# Patient Record
Sex: FEMALE | Race: WHITE | Employment: FULL TIME | URBAN - NONMETROPOLITAN AREA
[De-identification: names, ages, dates, MRNs, and addresses within clinical notes are randomized per-mention and may not be internally consistent; named-entity substitution may affect disease eponyms.]

---

## 2017-07-10 ENCOUNTER — HOSPITAL ENCOUNTER (EMERGENCY)
Age: 32
Discharge: HOME | End: 2017-07-10
Attending: EMERGENCY MEDICINE
Payer: MEDICARE

## 2017-07-10 ENCOUNTER — APPOINTMENT (OUTPATIENT)
Dept: GENERAL RADIOLOGY | Age: 32
End: 2017-07-10
Payer: MEDICARE

## 2017-07-10 VITALS
RESPIRATION RATE: 16 BRPM | SYSTOLIC BLOOD PRESSURE: 94 MMHG | TEMPERATURE: 97.7 F | BODY MASS INDEX: 35.65 KG/M2 | WEIGHT: 214 LBS | HEIGHT: 65 IN | DIASTOLIC BLOOD PRESSURE: 61 MMHG | HEART RATE: 64 BPM | OXYGEN SATURATION: 98 %

## 2017-07-10 DIAGNOSIS — R07.9 CHEST PAIN, UNSPECIFIED TYPE: Primary | ICD-10-CM

## 2017-07-10 LAB
ABSOLUTE EOS #: 0.3 K/UL (ref 0–0.4)
ABSOLUTE LYMPH #: 2.3 K/UL (ref 1–4.8)
ABSOLUTE MONO #: 0.7 K/UL (ref 0.1–1.2)
ANION GAP SERPL CALCULATED.3IONS-SCNC: 12 MMOL/L (ref 9–17)
BASOPHILS # BLD: 1 %
BASOPHILS ABSOLUTE: 0.1 K/UL (ref 0–0.2)
BUN BLDV-MCNC: 12 MG/DL (ref 6–20)
BUN/CREAT BLD: 20 (ref 9–20)
CALCIUM SERPL-MCNC: 8.3 MG/DL (ref 8.6–10.4)
CHLORIDE BLD-SCNC: 104 MMOL/L (ref 98–107)
CO2: 23 MMOL/L (ref 20–31)
CREAT SERPL-MCNC: 0.6 MG/DL (ref 0.5–0.9)
D DIMER: <100 NG/ML
DIFFERENTIAL TYPE: ABNORMAL
EOSINOPHILS RELATIVE PERCENT: 4 %
GFR AFRICAN AMERICAN: >60 ML/MIN
GFR NON-AFRICAN AMERICAN: >60 ML/MIN
GFR SERPL CREATININE-BSD FRML MDRD: ABNORMAL ML/MIN/{1.73_M2}
GFR SERPL CREATININE-BSD FRML MDRD: ABNORMAL ML/MIN/{1.73_M2}
GLUCOSE BLD-MCNC: 110 MG/DL (ref 70–99)
HCT VFR BLD CALC: 40 % (ref 36–46)
HEMOGLOBIN: 13.2 G/DL (ref 12–16)
INR BLD: 1
LYMPHOCYTES # BLD: 25 %
MCH RBC QN AUTO: 29.3 PG (ref 26–34)
MCHC RBC AUTO-ENTMCNC: 33 G/DL (ref 31–37)
MCV RBC AUTO: 88.7 FL (ref 80–100)
MONOCYTES # BLD: 7 %
PARTIAL THROMBOPLASTIN TIME: 29.7 SEC (ref 27–35)
PDW BLD-RTO: 15.1 % (ref 11–14.5)
PLATELET # BLD: 250 K/UL (ref 140–450)
PLATELET ESTIMATE: ABNORMAL
PMV BLD AUTO: 8 FL (ref 6–12)
POTASSIUM SERPL-SCNC: 3.7 MMOL/L (ref 3.7–5.3)
PROTHROMBIN TIME: 10.5 SEC (ref 9.4–11.3)
RBC # BLD: 4.51 M/UL (ref 4–5.2)
RBC # BLD: ABNORMAL 10*6/UL
SEG NEUTROPHILS: 63 %
SEGMENTED NEUTROPHILS ABSOLUTE COUNT: 5.9 K/UL (ref 1.8–7.7)
SODIUM BLD-SCNC: 139 MMOL/L (ref 135–144)
TROPONIN INTERP: NORMAL
TROPONIN T: <0.03 NG/ML
WBC # BLD: 9.3 K/UL (ref 3.5–11)
WBC # BLD: ABNORMAL 10*3/UL

## 2017-07-10 PROCEDURE — 36415 COLL VENOUS BLD VENIPUNCTURE: CPT

## 2017-07-10 PROCEDURE — 96372 THER/PROPH/DIAG INJ SC/IM: CPT

## 2017-07-10 PROCEDURE — 71010 XR CHEST PORTABLE: CPT

## 2017-07-10 PROCEDURE — 85730 THROMBOPLASTIN TIME PARTIAL: CPT

## 2017-07-10 PROCEDURE — 85610 PROTHROMBIN TIME: CPT

## 2017-07-10 PROCEDURE — 93005 ELECTROCARDIOGRAM TRACING: CPT

## 2017-07-10 PROCEDURE — 84484 ASSAY OF TROPONIN QUANT: CPT

## 2017-07-10 PROCEDURE — 71010 XR CHEST PORTABLE: CPT | Performed by: RADIOLOGY

## 2017-07-10 PROCEDURE — 80048 BASIC METABOLIC PNL TOTAL CA: CPT

## 2017-07-10 PROCEDURE — 6360000002 HC RX W HCPCS: Performed by: EMERGENCY MEDICINE

## 2017-07-10 PROCEDURE — 85025 COMPLETE CBC W/AUTO DIFF WBC: CPT

## 2017-07-10 PROCEDURE — 85379 FIBRIN DEGRADATION QUANT: CPT

## 2017-07-10 PROCEDURE — 99285 EMERGENCY DEPT VISIT HI MDM: CPT

## 2017-07-10 PROCEDURE — 6370000000 HC RX 637 (ALT 250 FOR IP): Performed by: EMERGENCY MEDICINE

## 2017-07-10 RX ORDER — VENLAFAXINE 100 MG/1
300 TABLET ORAL DAILY
COMMUNITY
End: 2018-07-23 | Stop reason: ALTCHOICE

## 2017-07-10 RX ORDER — TIZANIDINE 4 MG/1
4 TABLET ORAL EVERY 6 HOURS PRN
COMMUNITY
End: 2018-02-14 | Stop reason: ALTCHOICE

## 2017-07-10 RX ORDER — HYDROXYZINE 50 MG/1
50 TABLET, FILM COATED ORAL 2 TIMES DAILY PRN
COMMUNITY
End: 2019-02-18

## 2017-07-10 RX ORDER — GABAPENTIN 600 MG/1
600 TABLET ORAL 3 TIMES DAILY
COMMUNITY
End: 2018-05-29

## 2017-07-10 RX ORDER — KETOROLAC TROMETHAMINE 30 MG/ML
60 INJECTION, SOLUTION INTRAMUSCULAR; INTRAVENOUS ONCE
Status: COMPLETED | OUTPATIENT
Start: 2017-07-10 | End: 2017-07-10

## 2017-07-10 RX ORDER — ASPIRIN 81 MG/1
324 TABLET, CHEWABLE ORAL ONCE
Status: COMPLETED | OUTPATIENT
Start: 2017-07-10 | End: 2017-07-10

## 2017-07-10 RX ORDER — TRAZODONE HYDROCHLORIDE 150 MG/1
150 TABLET ORAL NIGHTLY
COMMUNITY
End: 2018-07-23 | Stop reason: ALTCHOICE

## 2017-07-10 RX ORDER — PRAZOSIN HYDROCHLORIDE 1 MG/1
1 CAPSULE ORAL NIGHTLY
COMMUNITY
End: 2018-07-23 | Stop reason: ALTCHOICE

## 2017-07-10 RX ORDER — ALBUTEROL SULFATE 90 UG/1
2 AEROSOL, METERED RESPIRATORY (INHALATION) EVERY 6 HOURS PRN
COMMUNITY
End: 2021-04-29

## 2017-07-10 RX ADMIN — KETOROLAC TROMETHAMINE 60 MG: 30 INJECTION, SOLUTION INTRAMUSCULAR at 15:59

## 2017-07-10 RX ADMIN — ASPIRIN 81 MG 324 MG: 81 TABLET ORAL at 15:57

## 2017-07-10 ASSESSMENT — PAIN DESCRIPTION - ORIENTATION: ORIENTATION: LEFT

## 2017-07-10 ASSESSMENT — PAIN DESCRIPTION - DESCRIPTORS: DESCRIPTORS: PRESSURE;SHARP

## 2017-07-10 ASSESSMENT — PAIN DESCRIPTION - PAIN TYPE: TYPE: ACUTE PAIN

## 2017-07-10 ASSESSMENT — PAIN DESCRIPTION - DIRECTION: RADIATING_TOWARDS: L ARM

## 2017-07-10 ASSESSMENT — PAIN SCALES - GENERAL
PAINLEVEL_OUTOF10: 3
PAINLEVEL_OUTOF10: 5
PAINLEVEL_OUTOF10: 5

## 2017-07-10 ASSESSMENT — PAIN DESCRIPTION - LOCATION: LOCATION: CHEST

## 2017-07-10 ASSESSMENT — PAIN DESCRIPTION - ONSET: ONSET: GRADUAL

## 2017-07-15 LAB
EKG ATRIAL RATE: 75 BPM
EKG P AXIS: 29 DEGREES
EKG P-R INTERVAL: 144 MS
EKG Q-T INTERVAL: 394 MS
EKG QRS DURATION: 82 MS
EKG QTC CALCULATION (BAZETT): 439 MS
EKG R AXIS: 26 DEGREES
EKG T AXIS: 24 DEGREES
EKG VENTRICULAR RATE: 75 BPM

## 2017-08-24 ENCOUNTER — HOSPITAL ENCOUNTER (EMERGENCY)
Age: 32
Discharge: HOME | End: 2017-08-24
Attending: EMERGENCY MEDICINE
Payer: MEDICARE

## 2017-08-24 VITALS
DIASTOLIC BLOOD PRESSURE: 64 MMHG | TEMPERATURE: 98.1 F | SYSTOLIC BLOOD PRESSURE: 112 MMHG | RESPIRATION RATE: 12 BRPM | WEIGHT: 220 LBS | OXYGEN SATURATION: 98 % | HEART RATE: 90 BPM | BODY MASS INDEX: 36.61 KG/M2

## 2017-08-24 DIAGNOSIS — R51.9 ACUTE NONINTRACTABLE HEADACHE, UNSPECIFIED HEADACHE TYPE: Primary | ICD-10-CM

## 2017-08-24 PROCEDURE — 96375 TX/PRO/DX INJ NEW DRUG ADDON: CPT

## 2017-08-24 PROCEDURE — 6360000002 HC RX W HCPCS: Performed by: EMERGENCY MEDICINE

## 2017-08-24 PROCEDURE — 99283 EMERGENCY DEPT VISIT LOW MDM: CPT

## 2017-08-24 PROCEDURE — 96374 THER/PROPH/DIAG INJ IV PUSH: CPT

## 2017-08-24 PROCEDURE — 2580000003 HC RX 258: Performed by: EMERGENCY MEDICINE

## 2017-08-24 RX ORDER — 0.9 % SODIUM CHLORIDE 0.9 %
1000 INTRAVENOUS SOLUTION INTRAVENOUS ONCE
Status: COMPLETED | OUTPATIENT
Start: 2017-08-24 | End: 2017-08-24

## 2017-08-24 RX ORDER — PROMETHAZINE HYDROCHLORIDE 25 MG/ML
12.5 INJECTION, SOLUTION INTRAMUSCULAR; INTRAVENOUS ONCE
Status: COMPLETED | OUTPATIENT
Start: 2017-08-24 | End: 2017-08-24

## 2017-08-24 RX ORDER — KETOROLAC TROMETHAMINE 30 MG/ML
30 INJECTION, SOLUTION INTRAMUSCULAR; INTRAVENOUS ONCE
Status: COMPLETED | OUTPATIENT
Start: 2017-08-24 | End: 2017-08-24

## 2017-08-24 RX ORDER — DIPHENHYDRAMINE HYDROCHLORIDE 50 MG/ML
25 INJECTION INTRAMUSCULAR; INTRAVENOUS EVERY 6 HOURS PRN
Status: DISCONTINUED | OUTPATIENT
Start: 2017-08-24 | End: 2017-08-24 | Stop reason: HOSPADM

## 2017-08-24 RX ADMIN — PROMETHAZINE HYDROCHLORIDE 12.5 MG: 25 INJECTION INTRAMUSCULAR; INTRAVENOUS at 12:44

## 2017-08-24 RX ADMIN — SODIUM CHLORIDE 1000 ML: 9 INJECTION, SOLUTION INTRAVENOUS at 12:44

## 2017-08-24 RX ADMIN — DIPHENHYDRAMINE HYDROCHLORIDE 25 MG: 50 INJECTION, SOLUTION INTRAMUSCULAR; INTRAVENOUS at 12:44

## 2017-08-24 RX ADMIN — KETOROLAC TROMETHAMINE 30 MG: 30 INJECTION, SOLUTION INTRAMUSCULAR at 12:44

## 2017-08-24 ASSESSMENT — PAIN SCALES - GENERAL
PAINLEVEL_OUTOF10: 8
PAINLEVEL_OUTOF10: 5
PAINLEVEL_OUTOF10: 8

## 2017-08-24 ASSESSMENT — PAIN DESCRIPTION - LOCATION: LOCATION: HEAD

## 2017-08-24 ASSESSMENT — PAIN DESCRIPTION - PROGRESSION: CLINICAL_PROGRESSION: NOT CHANGED

## 2017-08-24 ASSESSMENT — PAIN DESCRIPTION - ONSET: ONSET: SUDDEN

## 2017-08-24 ASSESSMENT — PAIN DESCRIPTION - PAIN TYPE: TYPE: ACUTE PAIN

## 2017-08-24 ASSESSMENT — PAIN DESCRIPTION - ORIENTATION: ORIENTATION: RIGHT;POSTERIOR

## 2017-08-24 ASSESSMENT — PAIN DESCRIPTION - FREQUENCY: FREQUENCY: CONTINUOUS

## 2017-08-24 ASSESSMENT — PAIN DESCRIPTION - DESCRIPTORS: DESCRIPTORS: ACHING

## 2017-12-10 ENCOUNTER — HOSPITAL ENCOUNTER (EMERGENCY)
Age: 32
Discharge: HOME | End: 2017-12-10
Attending: EMERGENCY MEDICINE
Payer: MEDICARE

## 2017-12-10 ENCOUNTER — APPOINTMENT (OUTPATIENT)
Dept: GENERAL RADIOLOGY | Age: 32
End: 2017-12-10
Payer: MEDICARE

## 2017-12-10 VITALS
WEIGHT: 220 LBS | BODY MASS INDEX: 36.65 KG/M2 | TEMPERATURE: 98.4 F | OXYGEN SATURATION: 97 % | DIASTOLIC BLOOD PRESSURE: 84 MMHG | HEART RATE: 84 BPM | RESPIRATION RATE: 16 BRPM | HEIGHT: 65 IN | SYSTOLIC BLOOD PRESSURE: 131 MMHG

## 2017-12-10 DIAGNOSIS — J40 BRONCHITIS: Primary | ICD-10-CM

## 2017-12-10 LAB
DIRECT EXAM: NORMAL
Lab: NORMAL
SPECIMEN DESCRIPTION: NORMAL
STATUS: NORMAL

## 2017-12-10 PROCEDURE — 71020 XR CHEST STANDARD TWO VW: CPT

## 2017-12-10 PROCEDURE — 87804 INFLUENZA ASSAY W/OPTIC: CPT

## 2017-12-10 PROCEDURE — 99283 EMERGENCY DEPT VISIT LOW MDM: CPT

## 2017-12-10 RX ORDER — ONDANSETRON 4 MG/1
4 TABLET, ORALLY DISINTEGRATING ORAL ONCE
Status: DISCONTINUED | OUTPATIENT
Start: 2017-12-10 | End: 2017-12-10

## 2017-12-10 RX ORDER — BENZONATATE 200 MG/1
200 CAPSULE ORAL 3 TIMES DAILY PRN
Qty: 30 CAPSULE | Refills: 0 | Status: SHIPPED | OUTPATIENT
Start: 2017-12-10 | End: 2017-12-17

## 2017-12-10 RX ORDER — KETOROLAC TROMETHAMINE 30 MG/ML
30 INJECTION, SOLUTION INTRAMUSCULAR; INTRAVENOUS ONCE
Status: DISCONTINUED | OUTPATIENT
Start: 2017-12-10 | End: 2017-12-10

## 2017-12-10 RX ORDER — AZITHROMYCIN 250 MG/1
TABLET, FILM COATED ORAL
Qty: 1 PACKET | Refills: 0 | Status: SHIPPED | OUTPATIENT
Start: 2017-12-10 | End: 2017-12-20

## 2017-12-10 ASSESSMENT — ENCOUNTER SYMPTOMS
SORE THROAT: 1
ABDOMINAL PAIN: 0
WHEEZING: 0
SHORTNESS OF BREATH: 0
BACK PAIN: 0
CONSTIPATION: 0
RHINORRHEA: 1
NAUSEA: 0
BLOOD IN STOOL: 0
VOICE CHANGE: 1
COUGH: 1
DIARRHEA: 0
EYE PAIN: 0
VOMITING: 0

## 2017-12-10 ASSESSMENT — PAIN SCALES - GENERAL
PAINLEVEL_OUTOF10: 3
PAINLEVEL_OUTOF10: 2

## 2017-12-10 ASSESSMENT — PAIN DESCRIPTION - PAIN TYPE: TYPE: ACUTE PAIN

## 2017-12-10 ASSESSMENT — PAIN DESCRIPTION - LOCATION: LOCATION: THROAT

## 2017-12-10 NOTE — ED PROVIDER NOTES
Mandible fracture surgery; Tubal ligation; back surgery; and  section. CURRENT MEDICATIONS       Previous Medications    ALBUTEROL SULFATE  (90 BASE) MCG/ACT INHALER    Inhale 2 puffs into the lungs every 6 hours as needed for Wheezing    BUDESONIDE-FORMOTEROL FUMARATE (SYMBICORT IN)    Inhale into the lungs    GABAPENTIN (NEURONTIN) 600 MG TABLET    Take 600 mg by mouth 3 times daily    HYDROXYZINE (ATARAX) 50 MG TABLET    Take 50 mg by mouth 2 times daily as needed for Itching    PRAZOSIN (MINIPRESS) 1 MG CAPSULE    Take 1 mg by mouth nightly    TIZANIDINE (ZANAFLEX) 4 MG TABLET    Take 4 mg by mouth every 6 hours as needed    TOPIRAMATE (TOPAMAX PO)    Take by mouth    TRAZODONE (DESYREL) 150 MG TABLET    Take 150 mg by mouth nightly    VENLAFAXINE (EFFEXOR) 100 MG TABLET    Take 300 mg by mouth daily       ALLERGIES     is allergic to amoxicillin; clindamycin/lincomycin; and pcn [penicillins]. FAMILY HISTORY     has no family status information on file. family history is not on file. SOCIAL HISTORY      reports that she has been smoking Cigarettes. She has been smoking about 1.00 pack per day. She has never used smokeless tobacco.    PHYSICAL EXAM     INITIAL VITALS:  height is 5' 5\" (1.651 m) and weight is 220 lb (99.8 kg). Her tympanic temperature is 98.4 °F (36.9 °C). Her blood pressure is 131/84 and her pulse is 84. Her respiration is 16 and oxygen saturation is 97%. Physical Exam   Constitutional: She is oriented to person, place, and time. She appears well-developed and well-nourished. No distress. HENT:   Head: Normocephalic and atraumatic. Right Ear: External ear normal.   Left Ear: External ear normal.   Mouth/Throat: Oropharynx is clear and moist.   Eyes: Conjunctivae and EOM are normal. Pupils are equal, round, and reactive to light. Neck: Normal range of motion. Cardiovascular: Normal rate and regular rhythm.     Pulmonary/Chest: Effort normal and breath sounds normal. She has no wheezes. She has no rales. Patient has a harsh cough but lungs are generally clear   Abdominal: Soft. Bowel sounds are normal.   Musculoskeletal: She exhibits no edema or tenderness. Neurological: She is alert and oriented to person, place, and time. Skin: Skin is warm and dry. She is not diaphoretic. Psychiatric: She has a normal mood and affect. Her behavior is normal.       DIFFERENTIAL DIAGNOSIS/ MDM:     Viral syndrome, versus influenza versus pneumonia    DIAGNOSTIC RESULTS     EKG: All EKG's are interpreted by the Emergency Department Physician who either signs or Co-signs this chart in the absence of a cardiologist.        Michelle Javier:   I directly visualized the following  images and reviewed the radiologist interpretations:     EXAMINATION:   TWO VIEWS OF THE CHEST       12/10/2017 10:51 am       COMPARISON:   Chest July 10, 2017.       HISTORY:   ORDERING SYSTEM PROVIDED HISTORY: cough   TECHNOLOGIST PROVIDED HISTORY:   Reason for exam:->cough   Ordering Physician Provided Reason for Exam: Cough for 1 week.  History of   COPD and smoking for 20 years   Acuity: Acute   Type of Exam: Initial       FINDINGS:   Heart is normal in size.  Lungs are clear.  No free air.  Osseous structures   demonstrate mild degenerative change.           Impression   No acute process.                 ED BEDSIDE ULTRASOUND:       LABS:  Labs Reviewed   RAPID INFLUENZA A/B ANTIGENS           EMERGENCY DEPARTMENT COURSE:   Vitals:    Vitals:    12/10/17 1024   BP: 131/84   Pulse: 84   Resp: 16   Temp: 98.4 °F (36.9 °C)   TempSrc: Tympanic   SpO2: 97%   Weight: 220 lb (99.8 kg)   Height: 5' 5\" (1.651 m)     -------------------------  BP: 131/84, Temp: 98.4 °F (36.9 °C), Pulse: 84, Resp: 16        Re-evaluation Notes        CRITICAL CARE:   None        CONSULTS:      PROCEDURES:  None    FINAL IMPRESSION      1.  Bronchitis          DISPOSITION/PLAN   DISPOSITION Discharge    Condition on Disposition  Stable      PATIENT REFERRED TO:  Isacanusha Vazquez [address]  [phone]    Schedule an appointment as soon as possible for a visit in 3 days        DISCHARGE MEDICATIONS:  New Prescriptions    AZITHROMYCIN (ZITHROMAX) 250 MG TABLET    Take 2 tablets (500 mg) on Day 1, followed by 1 tablet (250 mg) once daily on Days 2 through 5. BENZONATATE (TESSALON) 200 MG CAPSULE    Take 1 capsule by mouth 3 times daily as needed for Cough       (Please note that portions of this note were completed with a voice recognition program.  Efforts were made to edit the dictations but occasionally words are mis-transcribed.)    Hinkle MD, F.A.A.E.M.   Attending Emergency Physician                            Amna Sullivan MD  12/10/17 [id number]

## 2018-02-14 ENCOUNTER — OFFICE VISIT (OUTPATIENT)
Dept: FAMILY MEDICINE CLINIC | Age: 33
End: 2018-02-14
Payer: MEDICARE

## 2018-02-14 ENCOUNTER — HOSPITAL ENCOUNTER (OUTPATIENT)
Dept: LAB | Age: 33
Setting detail: SPECIMEN
Discharge: HOME | End: 2018-02-14
Payer: MEDICARE

## 2018-02-14 VITALS
OXYGEN SATURATION: 98 % | DIASTOLIC BLOOD PRESSURE: 70 MMHG | RESPIRATION RATE: 12 BRPM | HEART RATE: 91 BPM | SYSTOLIC BLOOD PRESSURE: 118 MMHG | TEMPERATURE: 96.1 F | HEIGHT: 65 IN | BODY MASS INDEX: 35.45 KG/M2 | WEIGHT: 212.8 LBS

## 2018-02-14 DIAGNOSIS — N76.0 BV (BACTERIAL VAGINOSIS): ICD-10-CM

## 2018-02-14 DIAGNOSIS — Z11.4 SCREENING FOR HIV (HUMAN IMMUNODEFICIENCY VIRUS): ICD-10-CM

## 2018-02-14 DIAGNOSIS — F32.A ANXIETY AND DEPRESSION: ICD-10-CM

## 2018-02-14 DIAGNOSIS — F31.9 BIPOLAR 1 DISORDER (HCC): ICD-10-CM

## 2018-02-14 DIAGNOSIS — K64.9 HEMORRHOIDS, UNSPECIFIED HEMORRHOID TYPE: ICD-10-CM

## 2018-02-14 DIAGNOSIS — F41.9 ANXIETY AND DEPRESSION: ICD-10-CM

## 2018-02-14 DIAGNOSIS — Z00.00 ROUTINE HEALTH MAINTENANCE: ICD-10-CM

## 2018-02-14 DIAGNOSIS — F43.10 PTSD (POST-TRAUMATIC STRESS DISORDER): ICD-10-CM

## 2018-02-14 DIAGNOSIS — N89.8 CYST, VAGINA: ICD-10-CM

## 2018-02-14 DIAGNOSIS — Z00.00 ROUTINE HEALTH MAINTENANCE: Primary | ICD-10-CM

## 2018-02-14 DIAGNOSIS — B96.89 BV (BACTERIAL VAGINOSIS): ICD-10-CM

## 2018-02-14 DIAGNOSIS — F19.11 SUBSTANCE ABUSE IN REMISSION (HCC): ICD-10-CM

## 2018-02-14 DIAGNOSIS — Z12.4 CERVICAL CANCER SCREENING: ICD-10-CM

## 2018-02-14 LAB
ABSOLUTE EOS #: 0.3 K/UL (ref 0–0.4)
ABSOLUTE IMMATURE GRANULOCYTE: NORMAL K/UL (ref 0–0.3)
ABSOLUTE LYMPH #: 2 K/UL (ref 1–4.8)
ABSOLUTE MONO #: 0.5 K/UL (ref 0.1–1.2)
ALBUMIN SERPL-MCNC: 4.2 G/DL (ref 3.5–5.2)
ALBUMIN/GLOBULIN RATIO: 1.4 (ref 1–2.5)
ALP BLD-CCNC: 114 U/L (ref 35–104)
ALT SERPL-CCNC: 14 U/L (ref 5–33)
ANION GAP SERPL CALCULATED.3IONS-SCNC: 15 MMOL/L (ref 9–17)
AST SERPL-CCNC: 14 U/L
BASOPHILS # BLD: 1 % (ref 0–1)
BASOPHILS ABSOLUTE: 0.1 K/UL (ref 0–0.2)
BILIRUB SERPL-MCNC: 0.36 MG/DL (ref 0.3–1.2)
BUN BLDV-MCNC: 14 MG/DL (ref 6–20)
BUN/CREAT BLD: 20 (ref 9–20)
CALCIUM SERPL-MCNC: 8.8 MG/DL (ref 8.6–10.4)
CHLORIDE BLD-SCNC: 107 MMOL/L (ref 98–107)
CHOLESTEROL/HDL RATIO: 4.7
CHOLESTEROL: 187 MG/DL
CO2: 20 MMOL/L (ref 20–31)
CREAT SERPL-MCNC: 0.69 MG/DL (ref 0.5–0.9)
DIFFERENTIAL TYPE: NORMAL
EOSINOPHILS RELATIVE PERCENT: 3 % (ref 1–7)
ESTIMATED AVERAGE GLUCOSE: 111 MG/DL
GFR AFRICAN AMERICAN: >60 ML/MIN
GFR NON-AFRICAN AMERICAN: >60 ML/MIN
GFR SERPL CREATININE-BSD FRML MDRD: ABNORMAL ML/MIN/{1.73_M2}
GFR SERPL CREATININE-BSD FRML MDRD: ABNORMAL ML/MIN/{1.73_M2}
GLUCOSE BLD-MCNC: 95 MG/DL (ref 70–99)
HBA1C MFR BLD: 5.5 % (ref 4.8–5.9)
HCT VFR BLD CALC: 42.3 % (ref 36–46)
HDLC SERPL-MCNC: 40 MG/DL
HEMOGLOBIN: 14.2 G/DL (ref 12–16)
HIV AG/AB: NONREACTIVE
IMMATURE GRANULOCYTES: NORMAL %
LDL CHOLESTEROL: 130 MG/DL (ref 0–130)
LYMPHOCYTES # BLD: 23 % (ref 16–46)
MCH RBC QN AUTO: 30.6 PG (ref 26–34)
MCHC RBC AUTO-ENTMCNC: 33.5 G/DL (ref 31–37)
MCV RBC AUTO: 91.2 FL (ref 80–100)
MONOCYTES # BLD: 6 % (ref 4–11)
NRBC AUTOMATED: NORMAL PER 100 WBC
PDW BLD-RTO: 14.3 % (ref 11–14.5)
PLATELET # BLD: 287 K/UL (ref 140–450)
PLATELET ESTIMATE: NORMAL
PMV BLD AUTO: 8.2 FL (ref 6–12)
POTASSIUM SERPL-SCNC: 4 MMOL/L (ref 3.7–5.3)
RBC # BLD: 4.64 M/UL (ref 4–5.2)
RBC # BLD: NORMAL 10*6/UL
SEG NEUTROPHILS: 67 % (ref 43–77)
SEGMENTED NEUTROPHILS ABSOLUTE COUNT: 5.8 K/UL (ref 1.8–7.7)
SODIUM BLD-SCNC: 142 MMOL/L (ref 135–144)
THYROXINE, FREE: 0.99 NG/DL (ref 0.93–1.7)
TOTAL PROTEIN: 7.1 G/DL (ref 6.4–8.3)
TRIGL SERPL-MCNC: 85 MG/DL
TSH SERPL DL<=0.05 MIU/L-ACNC: 2.34 MIU/L (ref 0.3–5)
VITAMIN D 25-HYDROXY: 23.8 NG/ML (ref 30–100)
VLDLC SERPL CALC-MCNC: ABNORMAL MG/DL (ref 1–30)
WBC # BLD: 8.7 K/UL (ref 3.5–11)
WBC # BLD: NORMAL 10*3/UL

## 2018-02-14 PROCEDURE — 80061 LIPID PANEL: CPT

## 2018-02-14 PROCEDURE — 87389 HIV-1 AG W/HIV-1&-2 AB AG IA: CPT

## 2018-02-14 PROCEDURE — 99385 PREV VISIT NEW AGE 18-39: CPT | Performed by: NURSE PRACTITIONER

## 2018-02-14 PROCEDURE — 84439 ASSAY OF FREE THYROXINE: CPT

## 2018-02-14 PROCEDURE — 36415 COLL VENOUS BLD VENIPUNCTURE: CPT

## 2018-02-14 PROCEDURE — 85025 COMPLETE CBC W/AUTO DIFF WBC: CPT

## 2018-02-14 PROCEDURE — 82306 VITAMIN D 25 HYDROXY: CPT

## 2018-02-14 PROCEDURE — 80053 COMPREHEN METABOLIC PANEL: CPT

## 2018-02-14 PROCEDURE — 84443 ASSAY THYROID STIM HORMONE: CPT

## 2018-02-14 PROCEDURE — 83036 HEMOGLOBIN GLYCOSYLATED A1C: CPT

## 2018-02-14 RX ORDER — NABUMETONE 500 MG/1
500 TABLET, FILM COATED ORAL DAILY
COMMUNITY
End: 2018-07-23 | Stop reason: ALTCHOICE

## 2018-02-14 RX ORDER — BUDESONIDE AND FORMOTEROL FUMARATE DIHYDRATE 80; 4.5 UG/1; UG/1
2 AEROSOL RESPIRATORY (INHALATION) 2 TIMES DAILY
COMMUNITY
End: 2021-04-29

## 2018-02-14 RX ORDER — IBUPROFEN 800 MG/1
800 TABLET ORAL
COMMUNITY
Start: 2017-11-27 | End: 2018-07-23

## 2018-02-14 RX ORDER — NICOTINE 21 MG/24HR
14 PATCH, TRANSDERMAL 24 HOURS TRANSDERMAL
COMMUNITY
Start: 2017-12-13 | End: 2020-01-14

## 2018-02-14 RX ORDER — METRONIDAZOLE 500 MG/1
500 TABLET ORAL 2 TIMES DAILY
Qty: 14 TABLET | Refills: 0 | Status: SHIPPED | OUTPATIENT
Start: 2018-02-14 | End: 2018-05-29 | Stop reason: ALTCHOICE

## 2018-02-14 ASSESSMENT — ENCOUNTER SYMPTOMS
TROUBLE SWALLOWING: 0
VOMITING: 0
ABDOMINAL PAIN: 0
CHEST TIGHTNESS: 0
CONSTIPATION: 0
ALLERGIC/IMMUNOLOGIC NEGATIVE: 1
DIARRHEA: 0
NAUSEA: 0
COUGH: 0
SHORTNESS OF BREATH: 0
EYES NEGATIVE: 1
SINUS PRESSURE: 0

## 2018-02-14 ASSESSMENT — PATIENT HEALTH QUESTIONNAIRE - PHQ9
SUM OF ALL RESPONSES TO PHQ QUESTIONS 1-9: 0
2. FEELING DOWN, DEPRESSED OR HOPELESS: 0
1. LITTLE INTEREST OR PLEASURE IN DOING THINGS: 0
SUM OF ALL RESPONSES TO PHQ9 QUESTIONS 1 & 2: 0

## 2018-02-14 NOTE — PROGRESS NOTES
[facility]    Subjective:      Patient ID: Jessica Tran is a 27 y.o. y.o. female. HPI Patient in for office to establish with new PCP. She was previously and recently seen bey her PCP this month. Today she has a concern related to vaginal pain, itching, discharge, and lump in the area. She states that it started couple months. She states that it is not any worse or any better. She states that she has tried OTC medication for yeast infection. She states that she has had the lump for a couple years but does not recall that it has been that size. When she wipes she states when she wipe or uses a tampon she notices it but it is painful. LMP 2018 and she states those are pretty regular. I have reviewed the patient's medical history in detail and updated the computerized patient record. She does have a history of BV. She states that her symptoms seem to be closely related to this. She has had 2 partners in the last 10 months. She states that she does have hemorrhoids as well. She states that they swell and cause her pain when she has a BM. She has used OTC products without much effect. She sees behavioral health recovery services for her anxiety, bipolar, depression and PTSD and they order all of her current medications. 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 She is agreeable to lab work.      Past Medical History:   Diagnosis Date    Anxiety     Bipolar 1 disorder ([facility] [address]     COPD (chronic obstructive pulmonary disease) (HCC)     Headache     Major depressive disorder     PTSD (post-traumatic stress disorder)     Substance abuse     recovering alcoholic and drug [facility]-02 mo sober       Past Surgical History:   Procedure Laterality Date    APPENDECTOMY      BACK SURGERY       SECTION      MANDIBLE FRACTURE SURGERY      TONSILLECTOMY      TUBAL LIGATION      WISDOM TOOTH EXTRACTION         Family History   Problem Relation Age of Onset Negative for cough, chest tightness and shortness of breath. Cardiovascular: Negative for chest pain and palpitations. Gastrointestinal: Negative for abdominal pain, constipation, diarrhea, nausea and vomiting. Endocrine: Negative. Genitourinary: Positive for genital sores (lump on vaginal area. chronic), vaginal discharge (foul odor by report) and vaginal pain. Negative for difficulty urinating, dysuria, hematuria and menstrual problem. Musculoskeletal: Negative. Skin: Negative. Lump in vaginal area-chronic she states that it is firm  External hemorrhoids by report     Allergic/Immunologic: Negative. Neurological: Negative for dizziness, light-headedness and headaches. Hematological: Negative. Psychiatric/Behavioral: Negative. Objective:      /70 (Site: Right Arm, Position: Sitting, Cuff Size: Medium Adult)   Pulse 91   Temp 96.1 °F (35.6 °C) (Tympanic)   Resp 12   Ht 5' 5\" (1.651 m)   Wt 212 lb 12.8 oz (96.5 kg)   LMP 01/31/2018 (Exact Date)   SpO2 98%   Breastfeeding? No   BMI 35.41 kg/m²     Physical Exam   Constitutional: She is oriented to person, place, and time. She appears well-developed and well-nourished. HENT:   Head: Normocephalic and atraumatic. Right Ear: Hearing, tympanic membrane and external ear normal.   Left Ear: Hearing, tympanic membrane and external ear normal.   Nose: Nose normal.   Mouth/Throat: Uvula is midline, oropharynx is clear and moist and mucous membranes are normal. No oropharyngeal exudate or posterior oropharyngeal erythema. Eyes: Conjunctivae and EOM are normal. Pupils are equal, round, and reactive to light. Neck: Normal range of motion. Neck supple. Cardiovascular: Normal rate, regular rhythm, normal heart sounds and intact distal pulses. Pulmonary/Chest: Effort normal and breath sounds normal.   Abdominal: Soft. Bowel sounds are normal.   Genitourinary:       There is erythema in the vagina.  Vaginal discharge found.   Genitourinary Comments: RED-hard not draining flesh colored-by report patient did not want exam today because she is seeing OB/GYN   Musculoskeletal: Normal range of motion. Neurological: She is alert and oriented to person, place, and time. Skin: Skin is warm and dry. Psychiatric: She has a normal mood and affect. Her behavior is normal. Judgment and thought content normal.     Assessment & Plan:      1. Routine health maintenance  Will call with results and treat if needed  - Hemoglobin A1C; Future  - Comprehensive Metabolic Panel; Future  - CBC Auto Differential; Future  - Lipid Panel; Future  - Vitamin D 25 Hydroxy; Future  - TSH without Reflex; Future  - T4, Free; Future  - HIV Screen; Future    2. Screening for HIV (human immunodeficiency virus)    - HIV Screen; Future    3. Cervical cancer screening    - [address] [place] [address], Gynecology [place]    4. Cyst, vagina-chronic   Differential includes bartholins cyst  - [address] [place] [address], Gynecology [place]    5. BV (bacterial vaginosis)-acute    - metroNIDAZOLE (FLAGYL) 500 MG tablet; Take 1 tablet by mouth 2 times daily  Dispense: 14 tablet; Refill: 0  - clindamycin (CLEOCIN) 100 MG vaginal suppository; Place 1 suppository vaginally nightly for 7 days  Dispense: 7 suppository; Refill: 0    6. Hemorrhoids, unspecified hemorrhoid type-chronic  Discussed her options about this She would like to think about this and let us know what she decides. We discussed referral to gen surgery for eval and treatment. Follow up with behavioral health for her routine medications. Answered all of the patient's questions. Agrees with plan of care. Follow up in one year or sooner if needed.      Annalisa Chang NP  2/14/2018 10:48 AM

## 2018-05-29 ENCOUNTER — OFFICE VISIT (OUTPATIENT)
Dept: PRIMARY CARE CLINIC | Age: 33
End: 2018-05-29
Payer: COMMERCIAL

## 2018-05-29 VITALS
BODY MASS INDEX: 34.55 KG/M2 | TEMPERATURE: 97.6 F | HEART RATE: 78 BPM | SYSTOLIC BLOOD PRESSURE: 110 MMHG | HEIGHT: 65 IN | WEIGHT: 207.4 LBS | OXYGEN SATURATION: 97 % | DIASTOLIC BLOOD PRESSURE: 70 MMHG | RESPIRATION RATE: 12 BRPM

## 2018-05-29 DIAGNOSIS — S61.412A LACERATION OF LEFT HAND WITHOUT FOREIGN BODY, INITIAL ENCOUNTER: Primary | ICD-10-CM

## 2018-05-29 PROCEDURE — 99202 OFFICE O/P NEW SF 15 MIN: CPT | Performed by: NURSE PRACTITIONER

## 2018-05-29 PROCEDURE — 90471 IMMUNIZATION ADMIN: CPT | Performed by: NURSE PRACTITIONER

## 2018-05-29 PROCEDURE — 90715 TDAP VACCINE 7 YRS/> IM: CPT | Performed by: NURSE PRACTITIONER

## 2018-05-29 PROCEDURE — 12031 INTMD RPR S/A/T/EXT 2.5 CM/<: CPT | Performed by: NURSE PRACTITIONER

## 2018-05-29 ASSESSMENT — ENCOUNTER SYMPTOMS: RESPIRATORY NEGATIVE: 1

## 2018-06-29 ENCOUNTER — HOSPITAL ENCOUNTER (OUTPATIENT)
Dept: LAB | Age: 33
Setting detail: SPECIMEN
Discharge: HOME | End: 2018-06-29
Payer: COMMERCIAL

## 2018-06-29 ENCOUNTER — OFFICE VISIT (OUTPATIENT)
Dept: OBGYN | Age: 33
End: 2018-06-29
Payer: COMMERCIAL

## 2018-06-29 VITALS
HEART RATE: 78 BPM | DIASTOLIC BLOOD PRESSURE: 64 MMHG | SYSTOLIC BLOOD PRESSURE: 110 MMHG | HEIGHT: 65 IN | WEIGHT: 207 LBS | BODY MASS INDEX: 34.49 KG/M2

## 2018-06-29 DIAGNOSIS — N90.7 EPIDERMAL CYST OF VULVA: ICD-10-CM

## 2018-06-29 DIAGNOSIS — N94.6 MENORRHALGIA: ICD-10-CM

## 2018-06-29 DIAGNOSIS — Z72.51 RISKY SEXUAL BEHAVIOR: ICD-10-CM

## 2018-06-29 DIAGNOSIS — Z01.419 WOMEN'S ANNUAL ROUTINE GYNECOLOGICAL EXAMINATION: Primary | ICD-10-CM

## 2018-06-29 DIAGNOSIS — Z72.0 TOBACCO ABUSE: ICD-10-CM
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
## 2018-06-29 DIAGNOSIS — Z01.419 WOMEN'S ANNUAL ROUTINE GYNECOLOGICAL EXAMINATION: ICD-10-CM

## 2018-06-29 DIAGNOSIS — Z77.21 EXPOSURE TO BODY FLUIDS BY CONTAMINATED HYPODERMIC NEEDLE STICK: ICD-10-CM

## 2018-06-29 LAB
ABSOLUTE EOS #: 0.3 K/UL (ref 0–0.4)
ABSOLUTE IMMATURE GRANULOCYTE: ABNORMAL K/UL (ref 0–0.3)
ABSOLUTE LYMPH #: 2.6 K/UL (ref 1–4.8)
ABSOLUTE MONO #: 0.6 K/UL (ref 0.1–1.2)
BASOPHILS # BLD: 0 % (ref 0–1)
BASOPHILS ABSOLUTE: 0 K/UL (ref 0–0.2)
DIFFERENTIAL TYPE: ABNORMAL
EOSINOPHILS RELATIVE PERCENT: 3 % (ref 1–7)
HAV IGM SER IA-ACNC: NONREACTIVE
HCT VFR BLD CALC: 44.4 % (ref 36–46)
HEMOGLOBIN: 14.9 G/DL (ref 12–16)
HEPATITIS B CORE IGM ANTIBODY: NONREACTIVE
HEPATITIS B SURFACE ANTIGEN: NONREACTIVE
HEPATITIS C ANTIBODY: NONREACTIVE
HIV AG/AB: NONREACTIVE
IMMATURE GRANULOCYTES: ABNORMAL %
LYMPHOCYTES # BLD: 30 % (ref 16–46)
MCH RBC QN AUTO: 31.2 PG (ref 26–34)
MCHC RBC AUTO-ENTMCNC: 33.5 G/DL (ref 31–37)
MCV RBC AUTO: 93 FL (ref 80–100)
MONOCYTES # BLD: 7 % (ref 4–11)
NRBC AUTOMATED: ABNORMAL PER 100 WBC
PDW BLD-RTO: 14.6 % (ref 11–14.5)
PLATELET # BLD: 277 K/UL (ref 140–450)
PLATELET ESTIMATE: ABNORMAL
PMV BLD AUTO: 8 FL (ref 6–12)
RBC # BLD: 4.77 M/UL (ref 4–5.2)
RBC # BLD: ABNORMAL 10*6/UL
SEG NEUTROPHILS: 60 % (ref 43–77)
SEGMENTED NEUTROPHILS ABSOLUTE COUNT: 5.3 K/UL (ref 1.8–7.7)
T. PALLIDUM, IGG: NONREACTIVE
THYROXINE, FREE: 1.24 NG/DL (ref 0.93–1.7)
TSH SERPL DL<=0.05 MIU/L-ACNC: 1.39 MIU/L (ref 0.3–5)
WBC # BLD: 8.9 K/UL (ref 3.5–11)
WBC # BLD: ABNORMAL 10*3/UL

## 2018-06-29 PROCEDURE — 85025 COMPLETE CBC W/AUTO DIFF WBC: CPT

## 2018-06-29 PROCEDURE — 80074 ACUTE HEPATITIS PANEL: CPT

## 2018-06-29 PROCEDURE — 84439 ASSAY OF FREE THYROXINE: CPT

## 2018-06-29 PROCEDURE — 36415 COLL VENOUS BLD VENIPUNCTURE: CPT

## 2018-06-29 PROCEDURE — 87591 N.GONORRHOEAE DNA AMP PROB: CPT

## 2018-06-29 PROCEDURE — G0145 SCR C/V CYTO,THINLAYER,RESCR: HCPCS

## 2018-06-29 PROCEDURE — 87389 HIV-1 AG W/HIV-1&-2 AB AG IA: CPT

## 2018-06-29 PROCEDURE — 87491 CHLMYD TRACH DNA AMP PROBE: CPT

## 2018-06-29 PROCEDURE — 99385 PREV VISIT NEW AGE 18-39: CPT | Performed by: ADVANCED PRACTICE MIDWIFE

## 2018-06-29 PROCEDURE — 84443 ASSAY THYROID STIM HORMONE: CPT

## 2018-06-29 PROCEDURE — 86780 TREPONEMA PALLIDUM: CPT

## 2018-06-29 ASSESSMENT — ENCOUNTER SYMPTOMS
EYES NEGATIVE: 1
GASTROINTESTINAL NEGATIVE: 1
RESPIRATORY NEGATIVE: 1
ALLERGIC/IMMUNOLOGIC NEGATIVE: 1

## 2018-07-03 LAB
CHLAMYDIA BY THIN PREP: NEGATIVE
N. GONORRHOEAE DNA, THIN PREP: NEGATIVE

## 2018-07-14 ENCOUNTER — HOSPITAL ENCOUNTER (OUTPATIENT)
Dept: MAMMOGRAPHY | Age: 33
Discharge: HOME | End: 2018-07-16
Payer: COMMERCIAL

## 2018-07-14 DIAGNOSIS — Z01.419 WOMEN'S ANNUAL ROUTINE GYNECOLOGICAL EXAMINATION: ICD-10-CM

## 2018-07-14 PROCEDURE — 77063 BREAST TOMOSYNTHESIS BI: CPT

## 2018-07-18 ENCOUNTER — TELEPHONE (OUTPATIENT)
Dept: OBGYN | Age: 33
End: 2018-07-18

## 2018-07-18 DIAGNOSIS — R92.1 BREAST CALCIFICATIONS ON MAMMOGRAM: Primary | ICD-10-CM

## 2018-07-18 NOTE — TELEPHONE ENCOUNTER
Patient is calling in stating that she has tried to schedule her follow up rafael that you suggested.  But was told that she can not because there is no order in the system

## 2018-07-19 LAB — CYTOLOGY REPORT: NORMAL

## 2018-07-20 ENCOUNTER — HOSPITAL ENCOUNTER (OUTPATIENT)
Dept: MAMMOGRAPHY | Age: 33
Discharge: HOME | End: 2018-07-22
Payer: COMMERCIAL

## 2018-07-20 ENCOUNTER — HOSPITAL ENCOUNTER (OUTPATIENT)
Dept: ULTRASOUND IMAGING | Age: 33
Discharge: HOME | End: 2018-07-22
Payer: COMMERCIAL

## 2018-07-20 DIAGNOSIS — R92.1 BREAST CALCIFICATIONS ON MAMMOGRAM: ICD-10-CM

## 2018-07-20 PROCEDURE — 77065 DX MAMMO INCL CAD UNI: CPT

## 2018-07-20 PROCEDURE — 76642 ULTRASOUND BREAST LIMITED: CPT

## 2018-07-23 ENCOUNTER — INITIAL CONSULT (OUTPATIENT)
Dept: SURGERY | Age: 33
End: 2018-07-23
Payer: COMMERCIAL

## 2018-07-23 ENCOUNTER — TELEPHONE (OUTPATIENT)
Dept: MAMMOGRAPHY | Age: 33
End: 2018-07-23

## 2018-07-23 VITALS
DIASTOLIC BLOOD PRESSURE: 80 MMHG | TEMPERATURE: 97.8 F | HEART RATE: 78 BPM | WEIGHT: 204 LBS | BODY MASS INDEX: 33.99 KG/M2 | SYSTOLIC BLOOD PRESSURE: 110 MMHG | HEIGHT: 65 IN

## 2018-07-23 DIAGNOSIS — R92.8 ABNORMAL MAMMOGRAM: Primary | ICD-10-CM

## 2018-07-23 PROCEDURE — 99202 OFFICE O/P NEW SF 15 MIN: CPT | Performed by: SURGERY

## 2018-07-23 ASSESSMENT — ENCOUNTER SYMPTOMS
DIARRHEA: 0
CONSTIPATION: 0
VOMITING: 0
COUGH: 1
NAUSEA: 0
BACK PAIN: 1

## 2018-07-23 NOTE — PROGRESS NOTES
Abnormal test results, please notify patient. I would recommend consideration of stereotatic bx. She had two aunts with early onset of breast CA and on  at early age.   JESSY/CONCHITA

## 2018-07-23 NOTE — PROGRESS NOTES
Grandfather         lung       Review of Systems   Constitutional: Positive for fatigue. Negative for chills and fever. Respiratory: Positive for cough. Cardiovascular: Negative. Gastrointestinal: Negative for constipation, diarrhea, nausea and vomiting. Endocrine: Negative for polydipsia, polyphagia and polyuria. Genitourinary: Negative for difficulty urinating, dyspareunia, hematuria, menstrual problem and pelvic pain. Musculoskeletal: Positive for back pain and gait problem. Skin: Negative for rash and wound. Loosing more hair than normal   Neurological: Negative for dizziness, seizures, syncope, facial asymmetry, speech difficulty, weakness, light-headedness, numbness and headaches. Hematological: Negative for adenopathy. Does not bruise/bleed easily. Psychiatric/Behavioral: Negative for behavioral problems, confusion and decreased concentration. The patient is not nervous/anxious. /80 (Site: Right Arm, Position: Sitting, Cuff Size: Medium Adult)   Pulse 78   Temp 97.8 °F (36.6 °C) (Tympanic)   Ht 5' 5\" (1.651 m)   Wt 204 lb (92.5 kg)   LMP 07/13/2018 (Exact Date)   BMI 33.95 kg/m²     Objective:   Physical Exam   Constitutional: She appears well-developed and well-nourished. No distress. Pulmonary/Chest: Right breast exhibits no inverted nipple, no mass, no nipple discharge, no skin change and no tenderness. Left breast exhibits no inverted nipple, no mass, no nipple discharge, no skin change and no tenderness. Breasts are symmetrical.   Multiple tattoos. Skin: She is not diaphoretic. Assessment:      1) BIRADs 4a  Mammogram right breast - patient had mammogram done because of her family history. Plan:      1) Stereotactic breast biopsy - Explained to her what BIRADS 4a means. Will general have her follow up about 1 week after biopsy to review the results and decide what to do if anything at that point.

## 2018-07-24 ENCOUNTER — TELEPHONE (OUTPATIENT)
Dept: OBGYN | Age: 33
End: 2018-07-24

## 2018-07-25 ENCOUNTER — HOSPITAL ENCOUNTER (OUTPATIENT)
Age: 33
Setting detail: SPECIMEN
Discharge: HOME | End: 2018-07-25

## 2018-07-25 ENCOUNTER — HOSPITAL ENCOUNTER (OUTPATIENT)
Dept: MAMMOGRAPHY | Age: 33
Discharge: HOME | End: 2018-07-27

## 2018-07-25 DIAGNOSIS — R92.8 ABNORMAL MAMMOGRAM: ICD-10-CM

## 2018-07-25 PROCEDURE — 88305 TISSUE EXAM BY PATHOLOGIST: CPT

## 2018-07-25 PROCEDURE — 19081 BX BREAST 1ST LESION STRTCTC: CPT

## 2018-07-25 PROCEDURE — 88342 IMHCHEM/IMCYTCHM 1ST ANTB: CPT

## 2018-07-25 PROCEDURE — 2500000003 HC RX 250 WO HCPCS

## 2018-07-27 LAB — SURGICAL PATHOLOGY REPORT: NORMAL

## 2018-11-06 ENCOUNTER — APPOINTMENT (OUTPATIENT)
Dept: GENERAL RADIOLOGY | Age: 33
End: 2018-11-06
Payer: COMMERCIAL

## 2018-11-06 ENCOUNTER — HOSPITAL ENCOUNTER (EMERGENCY)
Age: 33
Discharge: HOME | End: 2018-11-06
Attending: SPECIALIST
Payer: COMMERCIAL

## 2018-11-06 VITALS
SYSTOLIC BLOOD PRESSURE: 106 MMHG | RESPIRATION RATE: 16 BRPM | DIASTOLIC BLOOD PRESSURE: 59 MMHG | HEART RATE: 69 BPM | TEMPERATURE: 97.9 F | OXYGEN SATURATION: 98 %

## 2018-11-06 DIAGNOSIS — R07.89 ATYPICAL CHEST PAIN: Primary | ICD-10-CM

## 2018-11-06 LAB
-: NORMAL
ABSOLUTE EOS #: 0.1 K/UL (ref 0–0.4)
ABSOLUTE IMMATURE GRANULOCYTE: NORMAL K/UL (ref 0–0.3)
ABSOLUTE LYMPH #: 2.6 K/UL (ref 1–4.8)
ABSOLUTE MONO #: 0.6 K/UL (ref 0.1–1.2)
ANION GAP SERPL CALCULATED.3IONS-SCNC: 14 MMOL/L (ref 9–17)
BASOPHILS # BLD: 1 % (ref 0–1)
BASOPHILS ABSOLUTE: 0.1 K/UL (ref 0–0.2)
BNP INTERPRETATION: NORMAL
BUN BLDV-MCNC: 14 MG/DL (ref 6–20)
BUN/CREAT BLD: 21 (ref 9–20)
CALCIUM SERPL-MCNC: 8.9 MG/DL (ref 8.6–10.4)
CHLORIDE BLD-SCNC: 104 MMOL/L (ref 98–107)
CO2: 21 MMOL/L (ref 20–31)
CREAT SERPL-MCNC: 0.66 MG/DL (ref 0.5–0.9)
D DIMER: <100 NG/ML
DIFFERENTIAL TYPE: NORMAL
EKG ATRIAL RATE: 49 BPM
EKG ATRIAL RATE: 64 BPM
EKG P AXIS: 39 DEGREES
EKG P AXIS: 45 DEGREES
EKG P-R INTERVAL: 134 MS
EKG P-R INTERVAL: 144 MS
EKG Q-T INTERVAL: 426 MS
EKG Q-T INTERVAL: 464 MS
EKG QRS DURATION: 76 MS
EKG QRS DURATION: 90 MS
EKG QTC CALCULATION (BAZETT): 419 MS
EKG QTC CALCULATION (BAZETT): 439 MS
EKG R AXIS: 37 DEGREES
EKG R AXIS: 39 DEGREES
EKG T AXIS: 36 DEGREES
EKG T AXIS: 41 DEGREES
EKG VENTRICULAR RATE: 49 BPM
EKG VENTRICULAR RATE: 64 BPM
EOSINOPHILS RELATIVE PERCENT: 1 % (ref 1–7)
GFR AFRICAN AMERICAN: >60 ML/MIN
GFR NON-AFRICAN AMERICAN: >60 ML/MIN
GFR SERPL CREATININE-BSD FRML MDRD: ABNORMAL ML/MIN/{1.73_M2}
GFR SERPL CREATININE-BSD FRML MDRD: ABNORMAL ML/MIN/{1.73_M2}
GLUCOSE BLD-MCNC: 90 MG/DL (ref 70–99)
HCT VFR BLD CALC: 45.9 % (ref 36–46)
HEMOGLOBIN: 15.3 G/DL (ref 12–16)
IMMATURE GRANULOCYTES: NORMAL %
LYMPHOCYTES # BLD: 31 % (ref 16–46)
MAGNESIUM: 2 MG/DL (ref 1.6–2.6)
MCH RBC QN AUTO: 30.5 PG (ref 26–34)
MCHC RBC AUTO-ENTMCNC: 33.3 G/DL (ref 31–37)
MCV RBC AUTO: 91.7 FL (ref 80–100)
MONOCYTES # BLD: 7 % (ref 4–11)
NRBC AUTOMATED: NORMAL PER 100 WBC
PDW BLD-RTO: 14.5 % (ref 11–14.5)
PLATELET # BLD: 320 K/UL (ref 140–450)
PLATELET ESTIMATE: NORMAL
PMV BLD AUTO: 8.9 FL (ref 6–12)
POTASSIUM SERPL-SCNC: 3.9 MMOL/L (ref 3.7–5.3)
PRO-BNP: 47 PG/ML
RBC # BLD: 5.01 M/UL (ref 4–5.2)
RBC # BLD: NORMAL 10*6/UL
REASON FOR REJECTION: NORMAL
SEG NEUTROPHILS: 60 % (ref 43–77)
SEGMENTED NEUTROPHILS ABSOLUTE COUNT: 5 K/UL (ref 1.8–7.7)
SODIUM BLD-SCNC: 139 MMOL/L (ref 135–144)
TROPONIN INTERP: NORMAL
TROPONIN INTERP: NORMAL
TROPONIN T: <0.03 NG/ML
TROPONIN T: <0.03 NG/ML
WBC # BLD: 8.3 K/UL (ref 3.5–11)
WBC # BLD: NORMAL 10*3/UL
ZZ NTE CLEAN UP: ORDERED TEST: NORMAL
ZZ NTE WITH NAME CLEAN UP: SPECIMEN SOURCE: NORMAL

## 2018-11-06 PROCEDURE — 85379 FIBRIN DEGRADATION QUANT: CPT

## 2018-11-06 PROCEDURE — 84484 ASSAY OF TROPONIN QUANT: CPT

## 2018-11-06 PROCEDURE — 85025 COMPLETE CBC W/AUTO DIFF WBC: CPT

## 2018-11-06 PROCEDURE — 83880 ASSAY OF NATRIURETIC PEPTIDE: CPT

## 2018-11-06 PROCEDURE — 93005 ELECTROCARDIOGRAM TRACING: CPT

## 2018-11-06 PROCEDURE — 36415 COLL VENOUS BLD VENIPUNCTURE: CPT

## 2018-11-06 PROCEDURE — 83735 ASSAY OF MAGNESIUM: CPT

## 2018-11-06 PROCEDURE — 80048 BASIC METABOLIC PNL TOTAL CA: CPT

## 2018-11-06 PROCEDURE — 6370000000 HC RX 637 (ALT 250 FOR IP): Performed by: SPECIALIST

## 2018-11-06 PROCEDURE — 71046 X-RAY EXAM CHEST 2 VIEWS: CPT

## 2018-11-06 PROCEDURE — 99285 EMERGENCY DEPT VISIT HI MDM: CPT

## 2018-11-06 RX ORDER — ASPIRIN 81 MG/1
324 TABLET, CHEWABLE ORAL ONCE
Status: COMPLETED | OUTPATIENT
Start: 2018-11-06 | End: 2018-11-06

## 2018-11-06 RX ADMIN — ASPIRIN 324 MG: 81 TABLET, CHEWABLE ORAL at 07:30

## 2018-11-06 ASSESSMENT — ENCOUNTER SYMPTOMS
WHEEZING: 1
COUGH: 1
ABDOMINAL PAIN: 0
SHORTNESS OF BREATH: 1
NAUSEA: 0

## 2018-11-06 ASSESSMENT — PAIN DESCRIPTION - FREQUENCY: FREQUENCY: INTERMITTENT

## 2018-11-06 ASSESSMENT — PAIN DESCRIPTION - PAIN TYPE: TYPE: ACUTE PAIN

## 2018-11-06 ASSESSMENT — PAIN SCALES - GENERAL: PAINLEVEL_OUTOF10: 6

## 2018-11-06 ASSESSMENT — PAIN DESCRIPTION - DESCRIPTORS: DESCRIPTORS: SHARP

## 2018-11-06 ASSESSMENT — PAIN DESCRIPTION - LOCATION: LOCATION: CHEST

## 2018-11-06 NOTE — ED PROVIDER NOTES
[facility]  eMERGENCY dEPARTMENT eNCOUnter      Pt Name: Sammie Garza  MRN: [id number]  [place] 1985  Date of evaluation: 11/6/2018      CHIEF COMPLAINT       Chief Complaint   Patient presents with    Chest Pain         HISTORY OF PRESENT ILLNESS    Sammie Garza is a 26 y.o. female who presents To the emergency department complaining of intermittent substernal chest pain radiating occasionally to the left arm for last few days associated with shortness of breath, slight palpitations, lightheadedness and dizziness. Chest pain is sharp in character lasting few seconds to 2 minutes at a time and resolving by itself. Patient denies any diaphoresis, swelling in the legs or calf pain. She denies any recent long travels or prolonged immobilization and no history of DVT or PE in the past.  She has had occasional tingling in the hands and the feet. Chest pain increases with a deep breaths. Patient admits to having mild nonproductive cough and wheezing which she attributes to COPD. Patient smokes one pack per day for last 25 years and her parents have had heart disease. Mother had myocardial infarction 3 times, first MI at age 46, father had hypertension, kidney disease and heart disease. Patient has never had stress test in the past.  Patient has history of cocaine abuse but last use was 2 and half years ago. REVIEW OF SYSTEMS       Review of Systems   Constitutional: Negative for diaphoresis. Respiratory: Positive for cough, shortness of breath and wheezing. Cardiovascular: Positive for chest pain and palpitations. Negative for leg swelling. Gastrointestinal: Negative for abdominal pain and nausea. Neurological: Positive for dizziness and light-headedness. All other systems reviewed and are negative. PAST MEDICAL HISTORY    has a past medical history of Anxiety; Bipolar 1 disorder ([address] COPD (chronic obstructive pulmonary disease) ([facility] [address]  Headache; Major PROVIDED HISTORY: chest pain Ordering Physician Provided Reason for Exam: Chest pain for 2-3 days, smokes 1 PPD Acuity: Acute Type of Exam: Initial FINDINGS: No airspace opacity or evidence of pleural effusion. The heart is normal in size. No pneumothorax. No evidence pneumonia or pleural effusion. ED BEDSIDE ULTRASOUND:       LABS:  Labs Reviewed   BASIC METABOLIC PANEL W/ REFLEX TO MG FOR LOW K - Abnormal; Notable for the following:        Result Value    Bun/Cre Ratio 21 (*)     All other components within normal limits   CBC WITH AUTO DIFFERENTIAL   D-DIMER, RAPID   MAGNESIUM   SPECIMEN REJECTION   BRAIN NATRIURETIC PEPTIDE   TROPONIN     Serum d-dimer is negative for cervical cardiac markers are normal.    EMERGENCY DEPARTMENT COURSE:   Vitals:    Vitals:    11/06/18 0709   BP: 114/80   Pulse: 70   Resp: 18   Temp: 97.9 °F (36.6 °C)   TempSrc: Tympanic   SpO2: 100%     -------------------------  BP: 114/80, Temp: 97.9 °F (36.6 °C), Pulse: 70, Resp: 18    Orders Placed This Encounter   Medications    aspirin chewable tablet 324 mg       During emergency department course, patient was put on a monitor which revealed normal sinus rhythm. Saline Hep-Lock was started and patient was given aspirin 324 mg chewable. Repeat EKG and cardiac markers will be obtained at 10 a.m. Case will be turned over to Dr. Precious Anne at the end of my shift for further care and disposition. I have reviewed the disposition diagnosis with the patient and or their family/guardian. I have answered their questions and given discharge instructions. They voiced understanding of these instructions and did not have any further questions or complaints. Re-evaluation Notes    As per Dr. Joaquin Chan note    CRITICAL CARE:   None        CONSULTS:      PROCEDURES:  None    FINAL IMPRESSION    No diagnosis found.       DISPOSITION/PLAN   DISPOSITION     Condition on Disposition    As per Dr. Joaquin Chan note    PATIENT

## 2019-02-18 ENCOUNTER — HOSPITAL ENCOUNTER (OUTPATIENT)
Dept: GENERAL RADIOLOGY | Age: 34
Discharge: HOME | End: 2019-02-20

## 2019-02-18 ENCOUNTER — OFFICE VISIT (OUTPATIENT)
Dept: PRIMARY CARE CLINIC | Age: 34
End: 2019-02-18

## 2019-02-18 VITALS
HEART RATE: 74 BPM | WEIGHT: 189 LBS | BODY MASS INDEX: 31.45 KG/M2 | OXYGEN SATURATION: 98 % | TEMPERATURE: 98 F | SYSTOLIC BLOOD PRESSURE: 120 MMHG | DIASTOLIC BLOOD PRESSURE: 64 MMHG

## 2019-02-18 DIAGNOSIS — M79.601 RIGHT ARM PAIN: ICD-10-CM

## 2019-02-18 DIAGNOSIS — M79.601 RIGHT ARM PAIN: Primary | ICD-10-CM

## 2019-02-18 DIAGNOSIS — S50.11XA CONTUSION OF RIGHT FOREARM, INITIAL ENCOUNTER: ICD-10-CM

## 2019-02-18 PROCEDURE — 99213 OFFICE O/P EST LOW 20 MIN: CPT | Performed by: NURSE PRACTITIONER

## 2019-02-18 PROCEDURE — 73090 X-RAY EXAM OF FOREARM: CPT

## 2019-02-18 PROCEDURE — 73080 X-RAY EXAM OF ELBOW: CPT

## 2019-02-18 PROCEDURE — 73060 X-RAY EXAM OF HUMERUS: CPT

## 2019-02-18 RX ORDER — ONDANSETRON 4 MG/1
4 TABLET, FILM COATED ORAL EVERY 8 HOURS PRN
Qty: 8 TABLET | Refills: 0 | Status: SHIPPED | OUTPATIENT
Start: 2019-02-18 | End: 2019-02-21

## 2019-02-18 ASSESSMENT — PATIENT HEALTH QUESTIONNAIRE - PHQ9
1. LITTLE INTEREST OR PLEASURE IN DOING THINGS: 0
2. FEELING DOWN, DEPRESSED OR HOPELESS: 0
SUM OF ALL RESPONSES TO PHQ QUESTIONS 1-9: 0
SUM OF ALL RESPONSES TO PHQ QUESTIONS 1-9: 0
SUM OF ALL RESPONSES TO PHQ9 QUESTIONS 1 & 2: 0

## 2019-02-18 ASSESSMENT — ENCOUNTER SYMPTOMS: RESPIRATORY NEGATIVE: 1

## 2019-04-17 ENCOUNTER — TELEPHONE (OUTPATIENT)
Dept: MAMMOGRAPHY | Age: 34
End: 2019-04-17

## 2019-11-13 ENCOUNTER — HOSPITAL ENCOUNTER (EMERGENCY)
Age: 34
Discharge: HOME | End: 2019-11-14
Attending: EMERGENCY MEDICINE
Payer: COMMERCIAL

## 2019-11-13 ENCOUNTER — APPOINTMENT (OUTPATIENT)
Dept: GENERAL RADIOLOGY | Age: 34
End: 2019-11-13
Payer: COMMERCIAL

## 2019-11-13 VITALS
WEIGHT: 170 LBS | RESPIRATION RATE: 17 BRPM | OXYGEN SATURATION: 99 % | TEMPERATURE: 99.5 F | HEART RATE: 72 BPM | BODY MASS INDEX: 28.32 KG/M2 | DIASTOLIC BLOOD PRESSURE: 67 MMHG | SYSTOLIC BLOOD PRESSURE: 119 MMHG | HEIGHT: 65 IN

## 2019-11-13 DIAGNOSIS — J44.1 COPD EXACERBATION (HCC): Primary | ICD-10-CM

## 2019-11-13 DIAGNOSIS — R07.9 CHEST PAIN, UNSPECIFIED TYPE: ICD-10-CM

## 2019-11-13 LAB
ABSOLUTE EOS #: 0.1 K/UL (ref 0–0.4)
ABSOLUTE IMMATURE GRANULOCYTE: NORMAL K/UL (ref 0–0.3)
ABSOLUTE LYMPH #: 1.9 K/UL (ref 1–4.8)
ABSOLUTE MONO #: 0.6 K/UL (ref 0.1–1.2)
ANION GAP SERPL CALCULATED.3IONS-SCNC: 13 MMOL/L (ref 9–17)
BASOPHILS # BLD: 1 % (ref 0–1)
BASOPHILS ABSOLUTE: 0.1 K/UL (ref 0–0.2)
BUN BLDV-MCNC: 8 MG/DL (ref 6–20)
BUN/CREAT BLD: 11 (ref 9–20)
CALCIUM SERPL-MCNC: 9.1 MG/DL (ref 8.6–10.4)
CHLORIDE BLD-SCNC: 104 MMOL/L (ref 98–107)
CO2: 23 MMOL/L (ref 20–31)
CREAT SERPL-MCNC: 0.7 MG/DL (ref 0.5–0.9)
DIFFERENTIAL TYPE: NORMAL
EKG ATRIAL RATE: 90 BPM
EKG P AXIS: 75 DEGREES
EKG P-R INTERVAL: 146 MS
EKG Q-T INTERVAL: 374 MS
EKG QRS DURATION: 84 MS
EKG QTC CALCULATION (BAZETT): 457 MS
EKG R AXIS: 51 DEGREES
EKG T AXIS: 44 DEGREES
EKG VENTRICULAR RATE: 90 BPM
EOSINOPHILS RELATIVE PERCENT: 1 % (ref 1–7)
GFR AFRICAN AMERICAN: >60 ML/MIN
GFR NON-AFRICAN AMERICAN: >60 ML/MIN
GFR SERPL CREATININE-BSD FRML MDRD: ABNORMAL ML/MIN/{1.73_M2}
GFR SERPL CREATININE-BSD FRML MDRD: ABNORMAL ML/MIN/{1.73_M2}
GLUCOSE BLD-MCNC: 108 MG/DL (ref 70–99)
HCT VFR BLD CALC: 40.8 % (ref 36–46)
HEMOGLOBIN: 14 G/DL (ref 12–16)
IMMATURE GRANULOCYTES: NORMAL %
LYMPHOCYTES # BLD: 20 % (ref 16–46)
MCH RBC QN AUTO: 31.8 PG (ref 26–34)
MCHC RBC AUTO-ENTMCNC: 34.5 G/DL (ref 31–37)
MCV RBC AUTO: 92.4 FL (ref 80–100)
MONOCYTES # BLD: 7 % (ref 4–11)
NRBC AUTOMATED: NORMAL PER 100 WBC
PDW BLD-RTO: 14.2 % (ref 11–14.5)
PLATELET # BLD: 294 K/UL (ref 140–450)
PLATELET ESTIMATE: NORMAL
PMV BLD AUTO: 7.7 FL (ref 6–12)
POTASSIUM SERPL-SCNC: 3.4 MMOL/L (ref 3.7–5.3)
RBC # BLD: 4.41 M/UL (ref 4–5.2)
RBC # BLD: NORMAL 10*6/UL
SEG NEUTROPHILS: 71 % (ref 43–77)
SEGMENTED NEUTROPHILS ABSOLUTE COUNT: 6.9 K/UL (ref 1.8–7.7)
SODIUM BLD-SCNC: 140 MMOL/L (ref 135–144)
TROPONIN INTERP: NORMAL
TROPONIN INTERP: NORMAL
TROPONIN T: NORMAL NG/ML
TROPONIN T: NORMAL NG/ML
TROPONIN, HIGH SENSITIVITY: <6 NG/L (ref 0–14)
TROPONIN, HIGH SENSITIVITY: <6 NG/L (ref 0–14)
TSH SERPL DL<=0.05 MIU/L-ACNC: 1.95 MIU/L (ref 0.3–5)
WBC # BLD: 9.6 K/UL (ref 3.5–11)
WBC # BLD: NORMAL 10*3/UL

## 2019-11-13 PROCEDURE — 80048 BASIC METABOLIC PNL TOTAL CA: CPT

## 2019-11-13 PROCEDURE — 36415 COLL VENOUS BLD VENIPUNCTURE: CPT

## 2019-11-13 PROCEDURE — 93005 ELECTROCARDIOGRAM TRACING: CPT | Performed by: EMERGENCY MEDICINE

## 2019-11-13 PROCEDURE — 6370000000 HC RX 637 (ALT 250 FOR IP): Performed by: EMERGENCY MEDICINE

## 2019-11-13 PROCEDURE — 94640 AIRWAY INHALATION TREATMENT: CPT

## 2019-11-13 PROCEDURE — 84443 ASSAY THYROID STIM HORMONE: CPT

## 2019-11-13 PROCEDURE — 6370000000 HC RX 637 (ALT 250 FOR IP)

## 2019-11-13 PROCEDURE — 6360000002 HC RX W HCPCS: Performed by: EMERGENCY MEDICINE

## 2019-11-13 PROCEDURE — 84484 ASSAY OF TROPONIN QUANT: CPT

## 2019-11-13 PROCEDURE — 85025 COMPLETE CBC W/AUTO DIFF WBC: CPT

## 2019-11-13 PROCEDURE — 71046 X-RAY EXAM CHEST 2 VIEWS: CPT

## 2019-11-13 PROCEDURE — 99285 EMERGENCY DEPT VISIT HI MDM: CPT

## 2019-11-13 PROCEDURE — 94664 DEMO&/EVAL PT USE INHALER: CPT

## 2019-11-13 PROCEDURE — 96372 THER/PROPH/DIAG INJ SC/IM: CPT

## 2019-11-13 RX ORDER — ALBUTEROL SULFATE 90 UG/1
2 AEROSOL, METERED RESPIRATORY (INHALATION) ONCE
Status: COMPLETED | OUTPATIENT
Start: 2019-11-13 | End: 2019-11-13

## 2019-11-13 RX ORDER — PREDNISONE 10 MG/1
TABLET ORAL
Qty: 20 TABLET | Refills: 0 | Status: SHIPPED | OUTPATIENT
Start: 2019-11-13 | End: 2019-11-23

## 2019-11-13 RX ORDER — KETOROLAC TROMETHAMINE 30 MG/ML
30 INJECTION, SOLUTION INTRAMUSCULAR; INTRAVENOUS ONCE
Status: COMPLETED | OUTPATIENT
Start: 2019-11-13 | End: 2019-11-13

## 2019-11-13 RX ORDER — PREDNISONE 20 MG/1
40 TABLET ORAL ONCE
Status: COMPLETED | OUTPATIENT
Start: 2019-11-13 | End: 2019-11-13

## 2019-11-13 RX ORDER — HYDROXYZINE HYDROCHLORIDE 25 MG/1
25 TABLET, FILM COATED ORAL ONCE
Status: COMPLETED | OUTPATIENT
Start: 2019-11-14 | End: 2019-11-13

## 2019-11-13 RX ORDER — IPRATROPIUM BROMIDE AND ALBUTEROL SULFATE 2.5; .5 MG/3ML; MG/3ML
1 SOLUTION RESPIRATORY (INHALATION) ONCE
Status: COMPLETED | OUTPATIENT
Start: 2019-11-13 | End: 2019-11-13

## 2019-11-13 RX ORDER — HYDROXYZINE HYDROCHLORIDE 25 MG/1
25 TABLET, FILM COATED ORAL 3 TIMES DAILY PRN
Qty: 9 TABLET | Refills: 0 | Status: SHIPPED | OUTPATIENT
Start: 2019-11-13 | End: 2019-11-16

## 2019-11-13 RX ORDER — IPRATROPIUM BROMIDE AND ALBUTEROL SULFATE 2.5; .5 MG/3ML; MG/3ML
1 SOLUTION RESPIRATORY (INHALATION)
Status: DISCONTINUED | OUTPATIENT
Start: 2019-11-13 | End: 2019-11-13

## 2019-11-13 RX ADMIN — KETOROLAC TROMETHAMINE 30 MG: 30 INJECTION, SOLUTION INTRAMUSCULAR at 22:33

## 2019-11-13 RX ADMIN — PREDNISONE 40 MG: 20 TABLET ORAL at 23:57

## 2019-11-13 RX ADMIN — IPRATROPIUM BROMIDE AND ALBUTEROL SULFATE 1 AMPULE: .5; 3 SOLUTION RESPIRATORY (INHALATION) at 20:49

## 2019-11-13 RX ADMIN — HYDROXYZINE HYDROCHLORIDE 25 MG: 25 TABLET, FILM COATED ORAL at 23:57

## 2019-11-13 RX ADMIN — IPRATROPIUM BROMIDE AND ALBUTEROL SULFATE 1 AMPULE: .5; 3 SOLUTION RESPIRATORY (INHALATION) at 20:30

## 2019-11-13 RX ADMIN — ALBUTEROL SULFATE 2 PUFF: 90 AEROSOL, METERED RESPIRATORY (INHALATION) at 23:46

## 2019-11-13 ASSESSMENT — PAIN DESCRIPTION - LOCATION: LOCATION: CHEST

## 2019-11-13 ASSESSMENT — PAIN DESCRIPTION - PROGRESSION: CLINICAL_PROGRESSION: GRADUALLY IMPROVING

## 2019-11-13 ASSESSMENT — PAIN DESCRIPTION - PAIN TYPE: TYPE: ACUTE PAIN

## 2019-11-13 ASSESSMENT — PAIN DESCRIPTION - DESCRIPTORS: DESCRIPTORS: SHARP

## 2019-11-13 ASSESSMENT — PAIN DESCRIPTION - FREQUENCY: FREQUENCY: CONTINUOUS

## 2019-11-13 ASSESSMENT — PAIN DESCRIPTION - ORIENTATION: ORIENTATION: LEFT

## 2019-11-13 ASSESSMENT — PAIN SCALES - GENERAL
PAINLEVEL_OUTOF10: 5
PAINLEVEL_OUTOF10: 3
PAINLEVEL_OUTOF10: 6

## 2019-11-14 LAB
EKG ATRIAL RATE: 72 BPM
EKG P AXIS: 51 DEGREES
EKG P-R INTERVAL: 136 MS
EKG Q-T INTERVAL: 416 MS
EKG QRS DURATION: 80 MS
EKG QTC CALCULATION (BAZETT): 455 MS
EKG R AXIS: 55 DEGREES
EKG T AXIS: 46 DEGREES
EKG VENTRICULAR RATE: 72 BPM

## 2019-11-14 ASSESSMENT — ENCOUNTER SYMPTOMS
COUGH: 1
NAUSEA: 0
VOMITING: 0

## 2019-11-15 ENCOUNTER — TELEPHONE (OUTPATIENT)
Dept: OBGYN | Age: 34
End: 2019-11-15

## 2020-01-14 ENCOUNTER — OFFICE VISIT (OUTPATIENT)
Dept: ORTHOPEDIC SURGERY | Age: 35
End: 2020-01-14
Payer: COMMERCIAL

## 2020-01-14 VITALS
DIASTOLIC BLOOD PRESSURE: 63 MMHG | BODY MASS INDEX: 26.66 KG/M2 | SYSTOLIC BLOOD PRESSURE: 102 MMHG | RESPIRATION RATE: 12 BRPM | WEIGHT: 160 LBS | HEIGHT: 65 IN | HEART RATE: 53 BPM

## 2020-01-14 PROCEDURE — 99203 OFFICE O/P NEW LOW 30 MIN: CPT | Performed by: PHYSICIAN ASSISTANT

## 2020-01-14 RX ORDER — BUSPIRONE HYDROCHLORIDE 15 MG/1
15 TABLET ORAL 3 TIMES DAILY
COMMUNITY
Start: 2019-12-13 | End: 2021-04-29

## 2020-01-14 RX ORDER — HYDROXYZINE HYDROCHLORIDE 25 MG/1
25 TABLET, FILM COATED ORAL EVERY 6 HOURS PRN
COMMUNITY
Start: 2019-12-13 | End: 2020-06-20

## 2020-01-14 SDOH — HEALTH STABILITY: MENTAL HEALTH: HOW OFTEN DO YOU HAVE A DRINK CONTAINING ALCOHOL?: NEVER
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
## 2020-01-20 ENCOUNTER — HOSPITAL ENCOUNTER (OUTPATIENT)
Dept: MRI IMAGING | Age: 35
Discharge: HOME | End: 2020-01-22
Payer: COMMERCIAL

## 2020-01-20 PROCEDURE — 73221 MRI JOINT UPR EXTREM W/O DYE: CPT

## 2020-01-24 ENCOUNTER — OFFICE VISIT (OUTPATIENT)
Dept: ORTHOPEDIC SURGERY | Age: 35
End: 2020-01-24
Payer: COMMERCIAL

## 2020-01-24 VITALS
SYSTOLIC BLOOD PRESSURE: 114 MMHG | HEIGHT: 65 IN | WEIGHT: 160 LBS | HEART RATE: 79 BPM | BODY MASS INDEX: 26.66 KG/M2 | DIASTOLIC BLOOD PRESSURE: 62 MMHG

## 2020-01-24 PROCEDURE — A4565 SLINGS: HCPCS | Performed by: ORTHOPAEDIC SURGERY

## 2020-01-24 PROCEDURE — 99213 OFFICE O/P EST LOW 20 MIN: CPT | Performed by: ORTHOPAEDIC SURGERY

## 2020-01-24 RX ORDER — DICLOFENAC SODIUM 75 MG/1
75 TABLET, DELAYED RELEASE ORAL 2 TIMES DAILY
Qty: 60 TABLET | Refills: 2 | Status: SHIPPED | OUTPATIENT
Start: 2020-01-24 | End: 2020-08-04
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
## 2020-01-24 NOTE — LETTER
[address]  Phone: [phone]  Fax: [phone]    Julia Thakur MD        January 24, 2020     Patient: David Shaw   YOB: 1985   Date of Visit: 1/24/2020       To Whom It May Concern: It is my medical opinion that David Shaw may return to work on 1-27-20 with restrictioins of no lifting greater than5 pounds until 3-18-20. If you have any questions or concerns, please don't hesitate to call.     Sincerely,        Julia Thakur MD

## 2020-03-13 ENCOUNTER — OFFICE VISIT (OUTPATIENT)
Dept: ORTHOPEDIC SURGERY | Age: 35
End: 2020-03-13
Payer: COMMERCIAL

## 2020-03-13 ENCOUNTER — HOSPITAL ENCOUNTER (OUTPATIENT)
Dept: GENERAL RADIOLOGY | Age: 35
Discharge: HOME | End: 2020-03-15
Payer: COMMERCIAL

## 2020-03-13 VITALS
BODY MASS INDEX: 26.66 KG/M2 | WEIGHT: 160 LBS | SYSTOLIC BLOOD PRESSURE: 112 MMHG | HEART RATE: 64 BPM | DIASTOLIC BLOOD PRESSURE: 62 MMHG | HEIGHT: 65 IN

## 2020-03-13 PROCEDURE — 73030 X-RAY EXAM OF SHOULDER: CPT

## 2020-03-13 PROCEDURE — 99213 OFFICE O/P EST LOW 20 MIN: CPT | Performed by: PHYSICIAN ASSISTANT
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
## 2020-03-24 ENCOUNTER — TELEPHONE (OUTPATIENT)
Dept: OBGYN | Age: 35
End: 2020-03-24

## 2020-04-20 ENCOUNTER — OFFICE VISIT (OUTPATIENT)
Dept: ORTHOPEDIC SURGERY | Age: 35
End: 2020-04-20
Payer: COMMERCIAL

## 2020-04-20 VITALS
WEIGHT: 160 LBS | SYSTOLIC BLOOD PRESSURE: 110 MMHG | HEIGHT: 65 IN | DIASTOLIC BLOOD PRESSURE: 60 MMHG | BODY MASS INDEX: 26.66 KG/M2 | HEART RATE: 77 BPM

## 2020-04-20 PROCEDURE — 99213 OFFICE O/P EST LOW 20 MIN: CPT | Performed by: ORTHOPAEDIC SURGERY

## 2020-04-23 ENCOUNTER — OFFICE VISIT (OUTPATIENT)
Dept: OBGYN | Age: 35
End: 2020-04-23
Payer: COMMERCIAL

## 2020-04-23 ENCOUNTER — HOSPITAL ENCOUNTER (OUTPATIENT)
Dept: LAB | Age: 35
Discharge: HOME | End: 2020-04-23
Payer: COMMERCIAL

## 2020-04-23 VITALS
BODY MASS INDEX: 25.96 KG/M2 | WEIGHT: 155.8 LBS | HEIGHT: 65 IN | SYSTOLIC BLOOD PRESSURE: 112 MMHG | TEMPERATURE: 97 F | HEART RATE: 55 BPM | DIASTOLIC BLOOD PRESSURE: 80 MMHG

## 2020-04-23 LAB
ABSOLUTE EOS #: 0.18 K/UL (ref 0–0.44)
ABSOLUTE IMMATURE GRANULOCYTE: <0.03 K/UL (ref 0–0.3)
ABSOLUTE LYMPH #: 2.31 K/UL (ref 1.1–3.7)
ABSOLUTE MONO #: 0.47 K/UL (ref 0.1–1.2)
BASOPHILS # BLD: 1 % (ref 0–2)
BASOPHILS ABSOLUTE: 0.05 K/UL (ref 0–0.2)
DIFFERENTIAL TYPE: ABNORMAL
EOSINOPHILS RELATIVE PERCENT: 3 % (ref 1–4)
HCT VFR BLD CALC: 46.2 % (ref 36.3–47.1)
HEMOGLOBIN: 15.3 G/DL (ref 11.9–15.1)
IMMATURE GRANULOCYTES: 0 %
LYMPHOCYTES # BLD: 33 % (ref 24–43)
MCH RBC QN AUTO: 30.7 PG (ref 25.2–33.5)
MCHC RBC AUTO-ENTMCNC: 33.1 G/DL (ref 25.2–33.5)
MCV RBC AUTO: 92.6 FL (ref 82.6–102.9)
MONOCYTES # BLD: 7 % (ref 3–12)
NRBC AUTOMATED: 0 PER 100 WBC
PDW BLD-RTO: 13.6 % (ref 11.8–14.4)
PLATELET # BLD: 281 K/UL (ref 138–453)
PLATELET ESTIMATE: ABNORMAL
PMV BLD AUTO: 10.9 FL (ref 8.1–13.5)
RBC # BLD: 4.99 M/UL (ref 3.95–5.11)
RBC # BLD: ABNORMAL 10*6/UL
SEG NEUTROPHILS: 56 % (ref 36–65)
SEGMENTED NEUTROPHILS ABSOLUTE COUNT: 4.05 K/UL (ref 1.5–8.1)
THYROXINE, FREE: 1.43 NG/DL (ref 0.93–1.7)
TSH SERPL DL<=0.05 MIU/L-ACNC: 1.68 MIU/L (ref 0.3–5)
WBC # BLD: 7.1 K/UL (ref 3.5–11.3)
WBC # BLD: ABNORMAL 10*3/UL

## 2020-04-23 PROCEDURE — 85025 COMPLETE CBC W/AUTO DIFF WBC: CPT

## 2020-04-23 PROCEDURE — 87491 CHLMYD TRACH DNA AMP PROBE: CPT

## 2020-04-23 PROCEDURE — 36415 COLL VENOUS BLD VENIPUNCTURE: CPT

## 2020-04-23 PROCEDURE — 84439 ASSAY OF FREE THYROXINE: CPT

## 2020-04-23 PROCEDURE — 99214 OFFICE O/P EST MOD 30 MIN: CPT | Performed by: ADVANCED PRACTICE MIDWIFE

## 2020-04-23 PROCEDURE — 84443 ASSAY THYROID STIM HORMONE: CPT

## 2020-04-23 PROCEDURE — 87591 N.GONORRHOEAE DNA AMP PROB: CPT

## 2020-04-23 RX ORDER — BUPROPION HYDROCHLORIDE 150 MG/1
150 TABLET, EXTENDED RELEASE ORAL 2 TIMES DAILY
Qty: 60 TABLET | Refills: 3 | Status: SHIPPED | OUTPATIENT
Start: 2020-04-23 | End: 2020-06-20

## 2020-04-23 ASSESSMENT — ENCOUNTER SYMPTOMS
EYES NEGATIVE: 1
GASTROINTESTINAL NEGATIVE: 1
RESPIRATORY NEGATIVE: 1

## 2020-04-23 NOTE — PROGRESS NOTES
rate and regular rhythm. Pulmonary:      Effort: Pulmonary effort is normal.   Abdominal:      Palpations: Abdomen is soft. Genitourinary:     Vagina: Normal.   Musculoskeletal: Normal range of motion. Skin:     General: Skin is warm and dry. Neurological:      Mental Status: She is alert and oriented to person, place, and time. Deep Tendon Reflexes: Reflexes are normal and symmetric. Assessment:      Diagnosis Orders   1. Irregular menstrual bleeding  CBC With Auto Differential    TSH    T4, Free    US Pelvis Complete   2. Menorrhalgia  CBC With Auto Differential    TSH    T4, Free    US Pelvis Complete   3. Dysmenorrhea  CBC With Auto Differential    TSH    T4, Free    US Pelvis Complete           Plan:     Orders Placed This Encounter   Procedures    US Pelvis Complete     Standing Status:   Future     Standing Expiration Date:   7/22/2020     Scheduling Instructions: You will need a full bladder for this test.    CBC With Auto Differential     Standing Status:   Future     Standing Expiration Date:   4/23/2021    TSH     Standing Status:   Future     Standing Expiration Date:   4/23/2021    T4, Free     Standing Status:   Future     Standing Expiration Date:   4/23/2021     Will obtain labs and pelvic US, discussed wellbutrin, will start for tobacco cessation. To stop tobacco on day 14. RTO following US  for PE and pap.

## 2020-04-24 LAB
C. TRACHOMATIS DNA ,URINE: NEGATIVE
N. GONORRHOEAE DNA, URINE: NEGATIVE
SPECIMEN DESCRIPTION: NORMAL

## 2020-05-06 ENCOUNTER — TELEPHONE (OUTPATIENT)
Dept: ULTRASOUND IMAGING | Age: 35
End: 2020-05-06

## 2020-05-06 NOTE — TELEPHONE ENCOUNTER
This patient is scheduled for a pelvic ultrasound.  If you would like transvaginal images, an order must be placed- [id number]

## 2020-05-07 ENCOUNTER — HOSPITAL ENCOUNTER (OUTPATIENT)
Dept: ULTRASOUND IMAGING | Age: 35
Discharge: HOME | End: 2020-05-09
Payer: COMMERCIAL

## 2020-05-07 PROCEDURE — 76856 US EXAM PELVIC COMPLETE: CPT

## 2020-05-07 PROCEDURE — 76830 TRANSVAGINAL US NON-OB: CPT

## 2020-05-12 ENCOUNTER — OFFICE VISIT (OUTPATIENT)
Dept: ORTHOPEDIC SURGERY | Age: 35
End: 2020-05-12
Payer: COMMERCIAL

## 2020-05-12 ENCOUNTER — HOSPITAL ENCOUNTER (OUTPATIENT)
Dept: GENERAL RADIOLOGY | Age: 35
Discharge: HOME | End: 2020-05-14
Payer: COMMERCIAL

## 2020-05-12 VITALS
DIASTOLIC BLOOD PRESSURE: 64 MMHG | SYSTOLIC BLOOD PRESSURE: 112 MMHG | BODY MASS INDEX: 26.66 KG/M2 | HEIGHT: 65 IN | WEIGHT: 160 LBS

## 2020-05-12 PROCEDURE — 99203 OFFICE O/P NEW LOW 30 MIN: CPT | Performed by: PODIATRIST

## 2020-05-12 PROCEDURE — 73630 X-RAY EXAM OF FOOT: CPT

## 2020-05-12 NOTE — PROGRESS NOTES
[facility] and Procedure Note      [address] RECORD NUMBER:  [id number]  AGE: 37 y.o. GENDER: female  : 1985  EPISODE DATE:  2020    Subjective:     Chief Complaint   Patient presents with    Foot Pain     wilder bunions         HISTORY of PRESENT ILLNESS HPI     Vergia Au is a 37 y.o. female presenting for initial evaluation of bilateral forefoot pain. Patient suffers from moderate to severe but hallux abductovalgus deformities bilateral.  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 Patient is tried shoe modification as well as activity modification with little to no improvement. Patient would like to seek surgical intervention the form of a left bunion repair. Patient will tentatively be scheduled for 2020 at [facility].  Patient was given updated preadmission testing at today's visit. All questions concerns regarding perioperative management including benefits risk complications and alternatives to procedure were discussed in detail.         PAST MEDICAL HISTORY        Diagnosis Date    Anxiety     [facility] ([facility] [address]     COPD (chronic obstructive pulmonary disease) (H[facility])     Headache     Major depressive disorder     PTSD (post-traumatic stress disorder)     Substance abuse ([facility] [address]     recovering alcoholic and drug OJXEUG-80 mo sober       PAST SURGICAL HISTORY    Past Surgical History:   Procedure Laterality Date    APPENDECTOMY      BACK SURGERY       SECTION      MANDIBLE FRACTURE SURGERY      TONSILLECTOMY      TUBAL LIGATION      WISDOM TOOTH EXTRACTION         FAMILY HISTORY    Family History   Problem Relation Age of Onset    Heart Disease Mother     High Blood Pressure Mother     High Cholesterol Mother     Emphysema Mother     COPD Mother     Stroke Mother     Kidney Disease Father     High Blood Pressure Father     Cancer Maternal subcutaneous lesion, open ulceration      Objective:      /62   Ht 5' 5\" (1.651 m)   Wt 160 lb (72.6 kg)   BMI 26.63 kg/m²     Wt Readings from Last 3 Encounters:   05/12/20 160 lb (72.6 kg)   04/23/20 155 lb 12.8 oz (70.7 kg)   04/20/20 160 lb (72.6 kg)       PHYSICAL EXAMINATION  CONSTITUTIONAL:  awake, alert, cooperative, no apparent distress, and appears stated age  Vascular: Dorsalis pedis and posterior tibial pulses are palpable bilaterally. Skin temperature is warm to warm from proximal tibial tuberosity to distal digits. CFT wnl to exposed digits. Edema n/a. Hair growth wnl. Quality of skin well maintained. Dermatologic: skin envelope is well maintained, hydrated   Neurovascular: epicritic and protopathic sensations grossly intact   Musculoskeletal: Muscle strength 5/5 for all plantarflexors, dorsiflexors, inverters and everters examined. Patient noted to have moderate to severe hallux abductovalgus deformities bilateral.  She is also noted to have moderate hyperkeratotic callus formations underlying the second and fifth metatarsal phalangeal joint complex bilateral.  Bunion deformities appear semi-reducible in nature, soft tissue driven in nature. Imaging:  Bilateral feet demonstrate moderate degenerative changes at the 1st MTP joint with adjacent soft tissue swelling. Bony remodeling of the 2nd metatarsal suggestive of a remote healed fracture.         CBC:   Lab Results   Component Value Date    WBC 7.1 04/23/2020    HGB 15.3 04/23/2020    HCT 46.2 04/23/2020    MCV 92.6 04/23/2020     04/23/2020     BMP:   Lab Results   Component Value Date     11/13/2019    K 3.4 11/13/2019     11/13/2019    CO2 23 11/13/2019    BUN 8 11/13/2019    CREATININE 0.70 11/13/2019     PT/INR:   Lab Results   Component Value Date    PROTIME 10.5 07/10/2017    INR 1.0 07/10/2017     Prealbumin: No results found for: PREALBUMIN  Albumin:  Lab Results   Component Value Date    LABALBU 4.2 02/14/2018     Sed Rate:No results found for: SEDRATE  CRP: No results found for: CRP  Micro: No results found for: BC   Hemoglobin A1C:   Lab Results   Component Value Date    LABA1C 5.5 02/14/2018       Assessment:     1. Bunion of great toe of left foot  2. Bunion, right foot        Patient Active Problem List   Diagnosis    Cyst, vagina    Hemorrhoids    Anxiety and depression    Bipolar 1 disorder ([facility]    PTSD (post-traumatic stress disorder)    Substance abuse in remission [facility])         Procedure Note  Not applicable    Plan:     Patient examined and evaluated  X-rays obtained reviewed in detail  Discussed conservative or surgical options. Patient was given preadmission testing  Patient will be scheduled for surgical intervention 6/23/2020 for a left bunion repair.     Sandra Jackson [facility]   Electronically signed by Joyce Fuller DPM on 5/12/2020 at 12:59 PM

## 2020-05-13 ENCOUNTER — OFFICE VISIT (OUTPATIENT)
Dept: OBGYN | Age: 35
End: 2020-05-13
Payer: COMMERCIAL

## 2020-05-13 VITALS
DIASTOLIC BLOOD PRESSURE: 72 MMHG | WEIGHT: 155 LBS | BODY MASS INDEX: 25.83 KG/M2 | HEART RATE: 82 BPM | TEMPERATURE: 97.9 F | SYSTOLIC BLOOD PRESSURE: 116 MMHG | HEIGHT: 65 IN

## 2020-05-13 PROCEDURE — 99213 OFFICE O/P EST LOW 20 MIN: CPT | Performed by: ADVANCED PRACTICE MIDWIFE

## 2020-05-13 ASSESSMENT — ENCOUNTER SYMPTOMS
GASTROINTESTINAL NEGATIVE: 1
EYES NEGATIVE: 1
RESPIRATORY NEGATIVE: 1

## 2020-05-13 NOTE — PROGRESS NOTES
Uterus:  10.2 x 5.6 x 6.6 cm       Endometrial stripe:  10.5 mm       Right Ovary:  2.1 x 1.3 x 2 cm       Left Ovary:  3.8 x 3.5 x 2.1 cm           Ultrasound Findings:       Uterus: Uterus demonstrates normal myometrial echotexture.       Endometrial stripe: Endometrial stripe is within normal limits.       Right Ovary: Right ovary is within normal limits.       Left Ovary:  Left ovary is within normal limits. There are couple of   hypoechoic/cystic areas with irregular borders and low level internal echoes   measuring up to 1.6 and 1.4 cm most likely due to hemorrhagic or involuting   follicles.  No specific follow-up imaging is recommended         TSH 1.68  0.30 - 5.00 mIU/L Final 04/23/2020 11:46 AM       Thyroxine, Free 1.43  0.93 - 1.70 ng/dL Final 04/23/2020 11:46 AM     WBC 7.1  3.5 - 11.3 k/uL Final 04/23/2020 11:46 AM [facility]   RBC 4.99  3.95 - 5.11 m/uL Final 04/23/2020 11:46 AM [facility]   Hemoglobin 15. 3High   11.9 - 15.1 g/dL Final 04/23/2020 11:46 AM [facility]   Hematocrit 46.2  36.3 - 47.1 % Final 04/23/2020 11:46 AM [facility]   MCV 92.6  82.6 - 102.9 fL Final 04/23/2020 11:46 AM [facility]   MCH 30.7  25.2 - 33.5 pg Final 04/23/2020 11:46 AM [facility]   MCHC 33.1  25.2 - 33.5 g/dL Final 04/23/2020 11:46 AM [facility]   RDW 13.6  11.8 - 14.4 % Final 04/23/2020 11:46 AM [facility]   Platelets 932  440 - 453 k/uL Final 04/23/2020 11:46 AM [facility]   MPV 10.9  8.1 - 13.5 fL Final 04/23/2020 11:46 AM [facility]   NRBC Automated 0.0  0.0 per 100 WBC Final 04/23/2020 11:46 AM [facility]   Differential Type NOT REPORTED   Final 04/23/2020 11:46 AM [facility]   Seg Neutrophils 56  36 - 65 % Final 04/23/2020 11:46 AM [facility]   Lymphocytes 33  24 - 43 % Final 04/23/2020 11:46 AM [facility]   Monocytes 7  3 - 12 % Final 04/23/2020 11:46 AM [facility]   Eosinophils % 3  1 - 4 % Final 04/23/2020 11:46 AM

## 2020-05-18 ENCOUNTER — OFFICE VISIT (OUTPATIENT)
Dept: ORTHOPEDIC SURGERY | Age: 35
End: 2020-05-18
Payer: COMMERCIAL

## 2020-05-18 VITALS
WEIGHT: 160 LBS | BODY MASS INDEX: 26.66 KG/M2 | HEIGHT: 65 IN | DIASTOLIC BLOOD PRESSURE: 64 MMHG | SYSTOLIC BLOOD PRESSURE: 110 MMHG

## 2020-05-18 PROCEDURE — 99213 OFFICE O/P EST LOW 20 MIN: CPT | Performed by: ORTHOPAEDIC SURGERY

## 2020-05-18 NOTE — LETTER
[address] department of [place] [address]  Phone: [phone]  Fax: [phone]    Cipriano Kimball MD        May 18, 2020     Patient: Otilio Good   YOB: 1985   Date of Visit: 5/18/2020       To Whom It May Concern: It is my medical opinion that Otilio Good may return to work on 5-19-20 with no restrictions. If you have any questions or concerns, please don't hesitate to call.     Sincerely,        Cipriano Kimball MD

## 2020-05-18 NOTE — PROGRESS NOTES
sulfate  (90 Base) MCG/ACT inhaler Inhale 2 puffs into the lungs every 6 hours as needed for Wheezing       No current facility-administered medications for this visit. Allergies:  Amoxicillin; Clindamycin/lincomycin; Dust mite extract; Other; Pcn [penicillins]; and Pollen extract    Social History:   Social History     Tobacco Use   Smoking Status Current Every Day Smoker    Packs/day: 1.00    Years: 25.00    Pack years: 25.00    Start date: 1/1/1995   Smokeless Tobacco Never Used   Tobacco Comment    vaping     Social History     Substance and Sexual Activity   Alcohol Use No    Frequency: Never    Binge frequency: Never    Comment: recovering alcholic quit june 34,0537     Social History     Substance and Sexual Activity   Drug Use Yes    Types: Marijuana    Comment: former drug addict June 28,2016       Family History:  Family History   Problem Relation Age of Onset    Heart Disease Mother     High Blood Pressure Mother     High Cholesterol Mother     Emphysema Mother     COPD Mother     Stroke Mother     Kidney Disease Father     High Blood Pressure Father     Cancer Maternal Grandmother         breast    Cancer Paternal Grandfather         lung         REVIEW OF SYSTEMS:  Please see the ROS form attached to today's encounter. I have reviewed it with the patient during the visit. All other systems were reviewed and are negative. PHYSICAL EXAM:  On exam today she is alert and oriented x3. She is in no obvious distress. Her general appearance is within normal limits. Left shoulder has full active range of motion. She has mild tenderness palpation along the distal clavicle. She has no shoulder instability today. Skin is intact. Good capillary refill. Normal sensation. No lymphadenopathy. Cross adduction testing does not cause pain today. Radiology:      ASSESSMENT/PLAN:  1.  Contusion of left clavicle, subsequent encounter        Patient is doing well after her left clavicle injury. She still has some discomfort although overall is significantly improved. She feels as though she is ready to get back to work. We will release her to work tomorrow without restrictions. She will follow-up on an as-needed basis. No orders of the defined types were placed in this encounter.        Ann Tolbert MD

## 2020-06-03 ENCOUNTER — HOSPITAL ENCOUNTER (OUTPATIENT)
Dept: GENERAL RADIOLOGY | Age: 35
Discharge: HOME | End: 2020-06-05
Payer: COMMERCIAL

## 2020-06-03 ENCOUNTER — HOSPITAL ENCOUNTER (OUTPATIENT)
Dept: LAB | Age: 35
Discharge: HOME | End: 2020-06-03
Payer: COMMERCIAL

## 2020-06-03 ENCOUNTER — HOSPITAL ENCOUNTER (OUTPATIENT)
Dept: NON INVASIVE DIAGNOSTICS | Age: 35
Discharge: HOME | End: 2020-06-03
Payer: COMMERCIAL

## 2020-06-03 LAB
ABSOLUTE EOS #: 0.39 K/UL (ref 0–0.44)
ABSOLUTE IMMATURE GRANULOCYTE: <0.03 K/UL (ref 0–0.3)
ABSOLUTE LYMPH #: 2.71 K/UL (ref 1.1–3.7)
ABSOLUTE MONO #: 0.63 K/UL (ref 0.1–1.2)
ANION GAP SERPL CALCULATED.3IONS-SCNC: 13 MMOL/L (ref 9–17)
BASOPHILS # BLD: 1 % (ref 0–2)
BASOPHILS ABSOLUTE: 0.06 K/UL (ref 0–0.2)
BUN BLDV-MCNC: 16 MG/DL (ref 6–20)
BUN/CREAT BLD: 24 (ref 9–20)
CALCIUM SERPL-MCNC: 9 MG/DL (ref 8.6–10.4)
CHLORIDE BLD-SCNC: 100 MMOL/L (ref 98–107)
CO2: 27 MMOL/L (ref 20–31)
CREAT SERPL-MCNC: 0.67 MG/DL (ref 0.5–0.9)
DIFFERENTIAL TYPE: ABNORMAL
EOSINOPHILS RELATIVE PERCENT: 4 % (ref 1–4)
GFR AFRICAN AMERICAN: >60 ML/MIN
GFR NON-AFRICAN AMERICAN: >60 ML/MIN
GFR SERPL CREATININE-BSD FRML MDRD: ABNORMAL ML/MIN/{1.73_M2}
GFR SERPL CREATININE-BSD FRML MDRD: ABNORMAL ML/MIN/{1.73_M2}
GLUCOSE BLD-MCNC: 97 MG/DL (ref 70–99)
HCT VFR BLD CALC: 38 % (ref 36.3–47.1)
HEMOGLOBIN: 12.9 G/DL (ref 11.9–15.1)
IMMATURE GRANULOCYTES: 0 %
LYMPHOCYTES # BLD: 31 % (ref 24–43)
MCH RBC QN AUTO: 30.8 PG (ref 25.2–33.5)
MCHC RBC AUTO-ENTMCNC: 33.9 G/DL (ref 25.2–33.5)
MCV RBC AUTO: 90.7 FL (ref 82.6–102.9)
MONOCYTES # BLD: 7 % (ref 3–12)
NRBC AUTOMATED: 0 PER 100 WBC
PDW BLD-RTO: 14 % (ref 11.8–14.4)
PLATELET # BLD: 244 K/UL (ref 138–453)
PLATELET ESTIMATE: ABNORMAL
PMV BLD AUTO: 10.3 FL (ref 8.1–13.5)
POTASSIUM SERPL-SCNC: 3.7 MMOL/L (ref 3.7–5.3)
RBC # BLD: 4.19 M/UL (ref 3.95–5.11)
RBC # BLD: ABNORMAL 10*6/UL
SEG NEUTROPHILS: 57 % (ref 36–65)
SEGMENTED NEUTROPHILS ABSOLUTE COUNT: 4.98 K/UL (ref 1.5–8.1)
SODIUM BLD-SCNC: 140 MMOL/L (ref 135–144)
WBC # BLD: 8.8 K/UL (ref 3.5–11.3)
WBC # BLD: ABNORMAL 10*3/UL

## 2020-06-03 PROCEDURE — 93005 ELECTROCARDIOGRAM TRACING: CPT

## 2020-06-03 PROCEDURE — 36415 COLL VENOUS BLD VENIPUNCTURE: CPT

## 2020-06-03 PROCEDURE — 85025 COMPLETE CBC W/AUTO DIFF WBC: CPT

## 2020-06-03 PROCEDURE — 80048 BASIC METABOLIC PNL TOTAL CA: CPT

## 2020-06-03 PROCEDURE — 71046 X-RAY EXAM CHEST 2 VIEWS: CPT

## 2020-06-04 LAB
EKG ATRIAL RATE: 70 BPM
EKG P AXIS: 69 DEGREES
EKG P-R INTERVAL: 156 MS
EKG Q-T INTERVAL: 396 MS
EKG QRS DURATION: 92 MS
EKG QTC CALCULATION (BAZETT): 427 MS
EKG R AXIS: 47 DEGREES
EKG T AXIS: 21 DEGREES
EKG VENTRICULAR RATE: 70 BPM

## 2020-06-10 ENCOUNTER — NURSE ONLY (OUTPATIENT)
Dept: ORTHOPEDIC SURGERY | Age: 35
End: 2020-06-10
Payer: COMMERCIAL

## 2020-06-10 PROCEDURE — 99999 PR OFFICE/OUTPT VISIT,PROCEDURE ONLY: CPT | Performed by: ORTHOPAEDIC SURGERY

## 2020-06-17 ENCOUNTER — PRE-PROCEDURE TELEPHONE (OUTPATIENT)
Dept: PREADMISSION TESTING | Age: 35
End: 2020-06-17

## 2020-06-17 NOTE — TELEPHONE ENCOUNTER
Left message for patient to return our call regarding a need for a covid test prior to her procedure on 6/23. Left phone number for patient to return our call.

## 2020-06-19 ENCOUNTER — HOSPITAL ENCOUNTER (OUTPATIENT)
Dept: PREADMISSION TESTING | Age: 35
Setting detail: SPECIMEN
Discharge: HOME | End: 2020-06-23
Payer: COMMERCIAL

## 2020-06-19 ENCOUNTER — HOSPITAL ENCOUNTER (OUTPATIENT)
Age: 35
Setting detail: SPECIMEN
End: 2020-06-19
Payer: COMMERCIAL

## 2020-06-19 PROCEDURE — U0004 COV-19 TEST NON-CDC HGH THRU: HCPCS

## 2020-06-20 ENCOUNTER — OFFICE VISIT (OUTPATIENT)
Dept: PRIMARY CARE CLINIC | Age: 35
End: 2020-06-20
Payer: COMMERCIAL

## 2020-06-20 VITALS
RESPIRATION RATE: 16 BRPM | HEIGHT: 65 IN | BODY MASS INDEX: 25.83 KG/M2 | OXYGEN SATURATION: 98 % | TEMPERATURE: 98.6 F | DIASTOLIC BLOOD PRESSURE: 64 MMHG | HEART RATE: 62 BPM | WEIGHT: 155 LBS | SYSTOLIC BLOOD PRESSURE: 90 MMHG

## 2020-06-20 LAB
SARS-COV-2, PCR: NOT DETECTED
SARS-COV-2, RAPID: NORMAL
SARS-COV-2: NORMAL
SOURCE: NORMAL

## 2020-06-20 PROCEDURE — 99214 OFFICE O/P EST MOD 30 MIN: CPT | Performed by: FAMILY MEDICINE

## 2020-06-20 RX ORDER — DOXYCYCLINE HYCLATE 100 MG
100 TABLET ORAL 2 TIMES DAILY
Qty: 20 TABLET | Refills: 0 | Status: SHIPPED | OUTPATIENT
Start: 2020-06-20 | End: 2020-08-04

## 2020-06-20 RX ORDER — NICOTINE 21 MG/24HR
1 PATCH, TRANSDERMAL 24 HOURS TRANSDERMAL EVERY 24 HOURS
Qty: 42 PATCH | Refills: 0 | Status: SHIPPED | OUTPATIENT
Start: 2020-06-20 | End: 2020-11-06

## 2020-06-20 ASSESSMENT — ENCOUNTER SYMPTOMS
COLOR CHANGE: 0
WHEEZING: 0
ABDOMINAL PAIN: 0
DIARRHEA: 0
CHEST TIGHTNESS: 0
FACIAL SWELLING: 1
SHORTNESS OF BREATH: 0

## 2020-06-20 ASSESSMENT — PATIENT HEALTH QUESTIONNAIRE - PHQ9
2. FEELING DOWN, DEPRESSED OR HOPELESS: 0
SUM OF ALL RESPONSES TO PHQ QUESTIONS 1-9: 0
SUM OF ALL RESPONSES TO PHQ9 QUESTIONS 1 & 2: 0
SUM OF ALL RESPONSES TO PHQ QUESTIONS 1-9: 0
1. LITTLE INTEREST OR PLEASURE IN DOING THINGS: 0

## 2020-06-21 ENCOUNTER — TELEPHONE (OUTPATIENT)
Dept: PRIMARY CARE CLINIC | Age: 35
End: 2020-06-21

## 2020-06-23 ENCOUNTER — HOSPITAL ENCOUNTER (OUTPATIENT)
Age: 35
Setting detail: OUTPATIENT SURGERY
Discharge: HOME | End: 2020-06-23
Attending: PODIATRIST | Admitting: PODIATRIST
Payer: COMMERCIAL

## 2020-06-23 ENCOUNTER — APPOINTMENT (OUTPATIENT)
Dept: GENERAL RADIOLOGY | Age: 35
End: 2020-06-23
Attending: PODIATRIST
Payer: COMMERCIAL

## 2020-06-23 ENCOUNTER — ANESTHESIA EVENT (OUTPATIENT)
Dept: OPERATING ROOM | Age: 35
End: 2020-06-23
Payer: COMMERCIAL

## 2020-06-23 ENCOUNTER — ANESTHESIA (OUTPATIENT)
Dept: OPERATING ROOM | Age: 35
End: 2020-06-23
Payer: COMMERCIAL

## 2020-06-23 VITALS
BODY MASS INDEX: 25.69 KG/M2 | WEIGHT: 154.2 LBS | HEART RATE: 70 BPM | TEMPERATURE: 97.3 F | SYSTOLIC BLOOD PRESSURE: 103 MMHG | OXYGEN SATURATION: 100 % | RESPIRATION RATE: 16 BRPM | DIASTOLIC BLOOD PRESSURE: 59 MMHG | HEIGHT: 65 IN

## 2020-06-23 VITALS — OXYGEN SATURATION: 98 % | TEMPERATURE: 97.2 F | DIASTOLIC BLOOD PRESSURE: 49 MMHG | SYSTOLIC BLOOD PRESSURE: 95 MMHG

## 2020-06-23 LAB — HCG, PREGNANCY URINE (POC): NEGATIVE

## 2020-06-23 PROCEDURE — 7100000000 HC PACU RECOVERY - FIRST 15 MIN: Performed by: PODIATRIST

## 2020-06-23 PROCEDURE — 7100000010 HC PHASE II RECOVERY - FIRST 15 MIN: Performed by: PODIATRIST

## 2020-06-23 PROCEDURE — 2500000003 HC RX 250 WO HCPCS: Performed by: PODIATRIST

## 2020-06-23 PROCEDURE — 2709999900 HC NON-CHARGEABLE SUPPLY: Performed by: PODIATRIST

## 2020-06-23 PROCEDURE — 2500000003 HC RX 250 WO HCPCS: Performed by: NURSE ANESTHETIST, CERTIFIED REGISTERED

## 2020-06-23 PROCEDURE — 2580000003 HC RX 258: Performed by: PODIATRIST

## 2020-06-23 PROCEDURE — C1713 ANCHOR/SCREW BN/BN,TIS/BN: HCPCS | Performed by: PODIATRIST

## 2020-06-23 PROCEDURE — 3209999900 FLUORO FOR SURGICAL PROCEDURES

## 2020-06-23 PROCEDURE — 6370000000 HC RX 637 (ALT 250 FOR IP): Performed by: PODIATRIST

## 2020-06-23 PROCEDURE — 3700000000 HC ANESTHESIA ATTENDED CARE: Performed by: PODIATRIST

## 2020-06-23 PROCEDURE — 3600000002 HC SURGERY LEVEL 2 BASE: Performed by: PODIATRIST

## 2020-06-23 PROCEDURE — 7100000011 HC PHASE II RECOVERY - ADDTL 15 MIN: Performed by: PODIATRIST

## 2020-06-23 PROCEDURE — 6360000002 HC RX W HCPCS: Performed by: NURSE ANESTHETIST, CERTIFIED REGISTERED

## 2020-06-23 PROCEDURE — 3600000012 HC SURGERY LEVEL 2 ADDTL 15MIN: Performed by: PODIATRIST

## 2020-06-23 PROCEDURE — 3700000001 HC ADD 15 MINUTES (ANESTHESIA): Performed by: PODIATRIST

## 2020-06-23 PROCEDURE — 81025 URINE PREGNANCY TEST: CPT

## 2020-06-23 PROCEDURE — 73630 X-RAY EXAM OF FOOT: CPT

## 2020-06-23 PROCEDURE — 7100000001 HC PACU RECOVERY - ADDTL 15 MIN: Performed by: PODIATRIST

## 2020-06-23 PROCEDURE — 2720000010 HC SURG SUPPLY STERILE: Performed by: PODIATRIST

## 2020-06-23 PROCEDURE — 2780000010 HC IMPLANT OTHER: Performed by: PODIATRIST

## 2020-06-23 DEVICE — IMPLANTABLE DEVICE
Type: IMPLANTABLE DEVICE | Site: FOOT | Status: FUNCTIONAL
Brand: MICA SCREW SYSTEM

## 2020-06-23 DEVICE — IMPLANTABLE DEVICE
Type: IMPLANTABLE DEVICE | Site: FOOT | Status: FUNCTIONAL
Brand: PROSTEP™ MIS BUNION

## 2020-06-23 RX ORDER — SODIUM CHLORIDE 0.9 % (FLUSH) 0.9 %
10 SYRINGE (ML) INJECTION EVERY 12 HOURS SCHEDULED
Status: DISCONTINUED | OUTPATIENT
Start: 2020-06-23 | End: 2020-06-23 | Stop reason: HOSPADM

## 2020-06-23 RX ORDER — HYDROCODONE BITARTRATE AND ACETAMINOPHEN 5; 325 MG/1; MG/1
1 TABLET ORAL EVERY 4 HOURS PRN
Qty: 42 TABLET | Refills: 0 | Status: SHIPPED | OUTPATIENT
Start: 2020-06-23 | End: 2020-06-30

## 2020-06-23 RX ORDER — CYCLOBENZAPRINE HCL 5 MG
10 TABLET ORAL 3 TIMES DAILY PRN
Qty: 40 TABLET | Refills: 0 | Status: SHIPPED | OUTPATIENT
Start: 2020-06-23 | End: 2020-07-03

## 2020-06-23 RX ORDER — SODIUM CHLORIDE 0.9 % (FLUSH) 0.9 %
10 SYRINGE (ML) INJECTION PRN
Status: DISCONTINUED | OUTPATIENT
Start: 2020-06-23 | End: 2020-06-23 | Stop reason: HOSPADM

## 2020-06-23 RX ORDER — ONDANSETRON 2 MG/ML
INJECTION INTRAMUSCULAR; INTRAVENOUS PRN
Status: DISCONTINUED | OUTPATIENT
Start: 2020-06-23 | End: 2020-06-23 | Stop reason: SDUPTHER

## 2020-06-23 RX ORDER — PROMETHAZINE HYDROCHLORIDE 25 MG/1
12.5 TABLET ORAL EVERY 6 HOURS PRN
Status: DISCONTINUED | OUTPATIENT
Start: 2020-06-23 | End: 2020-06-23 | Stop reason: HOSPADM

## 2020-06-23 RX ORDER — KETOROLAC TROMETHAMINE 30 MG/ML
INJECTION, SOLUTION INTRAMUSCULAR; INTRAVENOUS PRN
Status: DISCONTINUED | OUTPATIENT
Start: 2020-06-23 | End: 2020-06-23 | Stop reason: SDUPTHER

## 2020-06-23 RX ORDER — DOXYCYCLINE HYCLATE 100 MG
100 TABLET ORAL 2 TIMES DAILY
Qty: 28 TABLET | Refills: 0 | Status: SHIPPED | OUTPATIENT
Start: 2020-06-23 | End: 2020-07-07

## 2020-06-23 RX ORDER — FENTANYL CITRATE 50 UG/ML
INJECTION, SOLUTION INTRAMUSCULAR; INTRAVENOUS PRN
Status: DISCONTINUED | OUTPATIENT
Start: 2020-06-23 | End: 2020-06-23 | Stop reason: SDUPTHER

## 2020-06-23 RX ORDER — DEXAMETHASONE SODIUM PHOSPHATE 10 MG/ML
INJECTION INTRAMUSCULAR; INTRAVENOUS PRN
Status: DISCONTINUED | OUTPATIENT
Start: 2020-06-23 | End: 2020-06-23 | Stop reason: SDUPTHER

## 2020-06-23 RX ORDER — PHENYLEPHRINE HYDROCHLORIDE 10 MG/ML
INJECTION INTRAVENOUS PRN
Status: DISCONTINUED | OUTPATIENT
Start: 2020-06-23 | End: 2020-06-23 | Stop reason: SDUPTHER

## 2020-06-23 RX ORDER — OXYCODONE HYDROCHLORIDE 5 MG/1
10 TABLET ORAL EVERY 4 HOURS PRN
Status: DISCONTINUED | OUTPATIENT
Start: 2020-06-23 | End: 2020-06-23 | Stop reason: HOSPADM

## 2020-06-23 RX ORDER — MIDAZOLAM HYDROCHLORIDE 1 MG/ML
INJECTION INTRAMUSCULAR; INTRAVENOUS PRN
Status: DISCONTINUED | OUTPATIENT
Start: 2020-06-23 | End: 2020-06-23 | Stop reason: SDUPTHER

## 2020-06-23 RX ORDER — SODIUM CHLORIDE, SODIUM LACTATE, POTASSIUM CHLORIDE, CALCIUM CHLORIDE 600; 310; 30; 20 MG/100ML; MG/100ML; MG/100ML; MG/100ML
INJECTION, SOLUTION INTRAVENOUS CONTINUOUS
Status: DISCONTINUED | OUTPATIENT
Start: 2020-06-23 | End: 2020-06-23 | Stop reason: HOSPADM

## 2020-06-23 RX ORDER — OXYCODONE HYDROCHLORIDE 5 MG/1
5 TABLET ORAL EVERY 4 HOURS PRN
Status: DISCONTINUED | OUTPATIENT
Start: 2020-06-23 | End: 2020-06-23 | Stop reason: HOSPADM

## 2020-06-23 RX ORDER — ONDANSETRON 2 MG/ML
4 INJECTION INTRAMUSCULAR; INTRAVENOUS EVERY 6 HOURS PRN
Status: DISCONTINUED | OUTPATIENT
Start: 2020-06-23 | End: 2020-06-23 | Stop reason: HOSPADM

## 2020-06-23 RX ORDER — GLYCOPYRROLATE 1 MG/5 ML
SYRINGE (ML) INTRAVENOUS PRN
Status: DISCONTINUED | OUTPATIENT
Start: 2020-06-23 | End: 2020-06-23 | Stop reason: SDUPTHER

## 2020-06-23 RX ORDER — BUPIVACAINE HYDROCHLORIDE 5 MG/ML
INJECTION, SOLUTION EPIDURAL; INTRACAUDAL PRN
Status: DISCONTINUED | OUTPATIENT
Start: 2020-06-23 | End: 2020-06-23 | Stop reason: ALTCHOICE

## 2020-06-23 RX ORDER — PROPOFOL 10 MG/ML
INJECTION, EMULSION INTRAVENOUS PRN
Status: DISCONTINUED | OUTPATIENT
Start: 2020-06-23 | End: 2020-06-23 | Stop reason: SDUPTHER

## 2020-06-23 RX ORDER — LIDOCAINE HYDROCHLORIDE 20 MG/ML
INJECTION, SOLUTION EPIDURAL; INFILTRATION; INTRACAUDAL; PERINEURAL PRN
Status: DISCONTINUED | OUTPATIENT
Start: 2020-06-23 | End: 2020-06-23 | Stop reason: SDUPTHER

## 2020-06-23 RX ADMIN — SODIUM CHLORIDE, POTASSIUM CHLORIDE, SODIUM LACTATE AND CALCIUM CHLORIDE: 600; 310; 30; 20 INJECTION, SOLUTION INTRAVENOUS at 11:55

## 2020-06-23 RX ADMIN — Medication 1 MG: at 13:28

## 2020-06-23 RX ADMIN — SODIUM CHLORIDE, POTASSIUM CHLORIDE, SODIUM LACTATE AND CALCIUM CHLORIDE: 600; 310; 30; 20 INJECTION, SOLUTION INTRAVENOUS at 13:04

## 2020-06-23 RX ADMIN — PROPOFOL 200 MG: 10 INJECTION, EMULSION INTRAVENOUS at 12:31

## 2020-06-23 RX ADMIN — ONDANSETRON 4 MG: 2 INJECTION INTRAMUSCULAR; INTRAVENOUS at 12:42

## 2020-06-23 RX ADMIN — OXYCODONE 5 MG: 5 TABLET ORAL at 14:22

## 2020-06-23 RX ADMIN — DEXAMETHASONE SODIUM PHOSPHATE 10 MG: 10 INJECTION INTRAMUSCULAR; INTRAVENOUS at 12:42

## 2020-06-23 RX ADMIN — Medication 2 G: at 12:35

## 2020-06-23 RX ADMIN — FENTANYL CITRATE 100 MCG: 50 INJECTION, SOLUTION INTRAMUSCULAR; INTRAVENOUS at 12:25

## 2020-06-23 RX ADMIN — KETOROLAC TROMETHAMINE 15 MG: 30 INJECTION, SOLUTION INTRAMUSCULAR; INTRAVENOUS at 13:24

## 2020-06-23 RX ADMIN — PHENYLEPHRINE HYDROCHLORIDE 200 MCG: 10 INJECTION INTRAVENOUS at 12:37

## 2020-06-23 RX ADMIN — LIDOCAINE HYDROCHLORIDE 100 MG: 20 INJECTION, SOLUTION EPIDURAL; INFILTRATION; INTRACAUDAL; PERINEURAL at 12:31

## 2020-06-23 RX ADMIN — SODIUM CHLORIDE, POTASSIUM CHLORIDE, SODIUM LACTATE AND CALCIUM CHLORIDE: 600; 310; 30; 20 INJECTION, SOLUTION INTRAVENOUS at 13:35

## 2020-06-23 RX ADMIN — PHENYLEPHRINE HYDROCHLORIDE 200 MCG: 10 INJECTION INTRAVENOUS at 12:41

## 2020-06-23 RX ADMIN — SODIUM CHLORIDE, POTASSIUM CHLORIDE, SODIUM LACTATE AND CALCIUM CHLORIDE: 600; 310; 30; 20 INJECTION, SOLUTION INTRAVENOUS at 12:25

## 2020-06-23 RX ADMIN — MIDAZOLAM HYDROCHLORIDE 2 MG: 1 INJECTION, SOLUTION INTRAMUSCULAR; INTRAVENOUS at 12:20

## 2020-06-23 RX ADMIN — Medication 0.2 MG: at 12:47

## 2020-06-23 ASSESSMENT — LIFESTYLE VARIABLES: SMOKING_STATUS: 1

## 2020-06-23 ASSESSMENT — PULMONARY FUNCTION TESTS
PIF_VALUE: 0
PIF_VALUE: 6
PIF_VALUE: 6
PIF_VALUE: 15
PIF_VALUE: 14
PIF_VALUE: 6
PIF_VALUE: 0
PIF_VALUE: 6
PIF_VALUE: 6
PIF_VALUE: 15
PIF_VALUE: 14
PIF_VALUE: 6
PIF_VALUE: 14
PIF_VALUE: 6
PIF_VALUE: 6
PIF_VALUE: 28
PIF_VALUE: 6
PIF_VALUE: 2
PIF_VALUE: 6
PIF_VALUE: 6
PIF_VALUE: 14
PIF_VALUE: 14
PIF_VALUE: 1
PIF_VALUE: 6
PIF_VALUE: 1
PIF_VALUE: 4
PIF_VALUE: 15
PIF_VALUE: 4
PIF_VALUE: 6
PIF_VALUE: 4
PIF_VALUE: 6
PIF_VALUE: 14
PIF_VALUE: 6
PIF_VALUE: 14
PIF_VALUE: 14
PIF_VALUE: 6
PIF_VALUE: 1
PIF_VALUE: 6
PIF_VALUE: 15
PIF_VALUE: 15
PIF_VALUE: 6
PIF_VALUE: 4
PIF_VALUE: 14
PIF_VALUE: 6
PIF_VALUE: 0

## 2020-06-23 ASSESSMENT — PAIN DESCRIPTION - DESCRIPTORS: DESCRIPTORS: ACHING

## 2020-06-23 ASSESSMENT — PAIN DESCRIPTION - FREQUENCY
FREQUENCY: CONTINUOUS

## 2020-06-23 ASSESSMENT — PAIN SCALES - GENERAL
PAINLEVEL_OUTOF10: 1
PAINLEVEL_OUTOF10: 3
PAINLEVEL_OUTOF10: 1
PAINLEVEL_OUTOF10: 3
PAINLEVEL_OUTOF10: 1

## 2020-06-23 ASSESSMENT — PAIN DESCRIPTION - ORIENTATION
ORIENTATION: LEFT
ORIENTATION: LEFT

## 2020-06-23 ASSESSMENT — PAIN DESCRIPTION - PAIN TYPE
TYPE: SURGICAL PAIN
TYPE: SURGICAL PAIN

## 2020-06-23 ASSESSMENT — PAIN DESCRIPTION - ONSET: ONSET: AWAKENED FROM SLEEP

## 2020-06-23 ASSESSMENT — PAIN DESCRIPTION - LOCATION: LOCATION: FOOT

## 2020-06-23 ASSESSMENT — PAIN - FUNCTIONAL ASSESSMENT: PAIN_FUNCTIONAL_ASSESSMENT: 0-10

## 2020-06-23 ASSESSMENT — COPD QUESTIONNAIRES: CAT_SEVERITY: MILD

## 2020-06-23 NOTE — H&P
Department of Surgery  H&P Interval Note  Outpatient History and Physical was reviewed pre-operatively, with no interval changes to note prior to scheduled surgical intervention. Patient was assessed, all questions/concerns regarding louie-operative management were addressed to patient satisfaction. Operative site was marked prior to transportation to the operative room. Dayami Fernandes D.P.M.

## 2020-06-23 NOTE — ANESTHESIA PRE PROCEDURE
lb (72.6 kg)     Body mass index is 25.66 kg/m². CBC:   Lab Results   Component Value Date    WBC 8.8 06/03/2020    RBC 4.19 06/03/2020    HGB 12.9 06/03/2020    HCT 38.0 06/03/2020    MCV 90.7 06/03/2020    RDW 14.0 06/03/2020     06/03/2020       CMP:   Lab Results   Component Value Date     06/03/2020    K 3.7 06/03/2020     06/03/2020    CO2 27 06/03/2020    BUN 16 06/03/2020    CREATININE 0.67 06/03/2020    GFRAA >60 06/03/2020    LABGLOM >60 06/03/2020    GLUCOSE 97 06/03/2020    PROT 7.1 02/14/2018    CALCIUM 9.0 06/03/2020    BILITOT 0.36 02/14/2018    ALKPHOS 114 02/14/2018    AST 14 02/14/2018    ALT 14 02/14/2018       POC Tests: No results for input(s): POCGLU, POCNA, POCK, POCCL, POCBUN, POCHEMO, POCHCT in the last 72 hours. Coags:   Lab Results   Component Value Date    PROTIME 10.5 07/10/2017    INR 1.0 07/10/2017    APTT 29.7 07/10/2017       HCG (If Applicable):   Lab Results   Component Value Date    HCG NEGATIVE 06/23/2020        ABGs: No results found for: PHART, PO2ART, ATW4OZT, VHE9TVS, BEART, A3ZZOTMJ     Type & Screen (If Applicable):  No results found for: LABABO, LABRH    Drug/Infectious Status (If Applicable):  Lab Results   Component Value Date    HEPCAB NONREACTIVE 06/29/2018       COVID-19 Screening (If Applicable):   Lab Results   Component Value Date    COVID19 Not Detected 06/19/2020         Anesthesia Evaluation  Patient summary reviewed and Nursing notes reviewed no history of anesthetic complications:   Airway: Mallampati: II  TM distance: >3 FB   Neck ROM: full  Mouth opening: > = 3 FB Dental:    (+) partials      Pulmonary: breath sounds clear to auscultation  (+) COPD: mild,  current smoker          Patient smoked on day of surgery.                  Cardiovascular:  Exercise tolerance: good (>4 METS),       (-)  angina      Rhythm: regular  Rate: normal           Beta Blocker:  Not on Beta Blocker         Neuro/Psych:   (+) psychiatric

## 2020-06-23 NOTE — BRIEF OP NOTE
Brief Postoperative Note      Patient: Dania Sanders  YOB: 1985  MRN: [id number]    Date of Procedure: 6/23/2020    Pre-Op Diagnosis: left foot bunion    Post-Op Diagnosis: Same       Procedure(s):  Left FOOT BUNIONECTOMY stemmed implant    Surgeon(s):  Arcenio Sam DPM    Assistant:  * No surgical staff found *    Anesthesia: General    Estimated Blood Loss (mL): Minimal    Complications: None    Specimens:   * No specimens in log *    Implants:  Implant Name Type Inv.  Item Serial No.  Lot No. LRB No. Used Action   IMPL KWIRE [id number] DBL TROCAR TIP NON TRD Screw/Plate/Nail/Marvin IMPL KWIRE [id number] DBL TROCAR TIP NON TRD  Dang Vaughn  Left 1 Implanted         Drains: * No LDAs found *    Findings: moderate HAV deformity, left forefoot    Electronically signed by Arcenio Sam DPM on 6/23/2020 at 1:33 PM

## 2020-06-23 NOTE — OP NOTE
approximately 4 cm in total length full-thickness with a 15 scalpel blade just medial to the long extensor tendon. Blunt dissection was then carried out with Metzenbaum scissors electrocautery Bovie making sure to avoid the proper digital nerve. The capsule was then longitudinally incised allowing for visualization of the underlying metatarsal phalangeal joint. A TPS sagittal saw was then used to remove the dorsal medial eminence from the metatarsal head. A lateral release of the capsule was then performed leasing the deep intermetatarsal ligament, insertion of the abductor hallucis muscle belly, fibular suspensory ligament. A transverse osteotomy was then made through the surgical neck of the metatarsal.  The metatarsal head was then transposed laterally approximately 8 mm. A broach from the [facility] medical bunion correction system was obtained and placed within the intramedullary canal noting good apposition of the distal capital fragment. A size medium broached stemmed implant was then implanted in the intramedullary canal of the metatarsal and the distal based capital fragment was then rotated out of valgus position into neutral position noting good anatomic alignment of sesamoid apparatus. The metatarsal head was then pinned with a medial lateral based K wire. Using an external jig guide a interfrag screw was then placed in a proximal medial to distal lateral orientation. This was drilled countersunk and filled with the appropriate size 4.3 mm compression screw. A distal based 3.5 mm locking screw was then placed in the medial to lateral orientation noting good anatomic reduction of the metatarsal head into the corrected position. A medial-based capsulorrhaphy was then performed removing a plantar based wedge from the capsule. This was imbricated using a 2-0 FiberWire stitch.   The remainder capsule was then fixed with a running 2-0 Vicryl stitch followed by layered closure consisting of 3-0 Monocryl subcutaneous and Zipline skin closure for skin. She was placed in a Betadine wet-to-dry dressing with application posterior splint. Patient was transferred to PACU in stable condition. All questions concerns regarding postop management were addressed with family.     Akbar Pablo, [address] [place] [address]   6/23/2020

## 2020-07-14 ENCOUNTER — OFFICE VISIT (OUTPATIENT)
Dept: ORTHOPEDIC SURGERY | Age: 35
End: 2020-07-14
Payer: COMMERCIAL

## 2020-07-14 ENCOUNTER — HOSPITAL ENCOUNTER (OUTPATIENT)
Dept: GENERAL RADIOLOGY | Age: 35
Discharge: HOME | End: 2020-07-16
Payer: COMMERCIAL

## 2020-07-14 VITALS
SYSTOLIC BLOOD PRESSURE: 108 MMHG | DIASTOLIC BLOOD PRESSURE: 64 MMHG | HEIGHT: 65 IN | WEIGHT: 160 LBS | HEART RATE: 70 BPM | BODY MASS INDEX: 26.66 KG/M2

## 2020-07-14 PROCEDURE — 99024 POSTOP FOLLOW-UP VISIT: CPT | Performed by: PODIATRIST

## 2020-07-14 PROCEDURE — 73630 X-RAY EXAM OF FOOT: CPT

## 2020-07-14 NOTE — PROGRESS NOTES
Packs/day: 1.00     Years: 25.00     Pack years: 25.00     Start date: 1/1/1995    Smokeless tobacco: Never Used    Tobacco comment: vaping, trying to quit   Substance Use Topics    Alcohol use: No     Frequency: Never     Binge frequency: Never     Comment: recovering alcholic quit june [id number]    Drug use: Yes     Types: Marijuana     Comment: former addict, none since 4/13/20       ALLERGIES    Allergies   Allergen Reactions    Latex      Powder in rubber gloves      Amoxicillin     Clindamycin/Lincomycin     Dust Mite Extract     Other      Pet dander    Pcn [Penicillins]     Pollen Extract        MEDICATIONS    Current Outpatient Medications on File Prior to Visit   Medication Sig Dispense Refill    doxycycline hyclate (VIBRA-TABS) 100 MG tablet Take 1 tablet by mouth 2 times daily 20 tablet 0    nicotine (NICODERM CQ) 14 MG/24HR Place 1 patch onto the skin every 24 hours 42 patch 0    nicotine (NICODERM CQ) 7 MG/24HR Place 1 patch onto the skin every 24 hours Start after you finish the 14mg patches 30 patch 0    diclofenac (VOLTAREN) 75 MG EC tablet Take 1 tablet by mouth 2 times daily Take daily with food and water 60 tablet 2    busPIRone (BUSPAR) 15 MG tablet Take 15 mg by mouth 3 times daily       budesonide-formoterol (SYMBICORT) 80-4.5 MCG/ACT AERO Inhale 2 puffs into the lungs 2 times daily      albuterol sulfate  (90 Base) MCG/ACT inhaler Inhale 2 puffs into the lungs every 6 hours as needed for Wheezing       No current facility-administered medications on file prior to visit.         Review of Systems  General: (-) weight change, fatigue, weakness, fever, chills, night sweats  Head: (-) trauma, heacache location, frequency, nausea, vomiting, visual changes  Eyes: (-) glasses, contact lenses, blurriness, tearing, itching, acute visual changes  Ears: (-) hearing loss, tinnitus, vertigo, discharge, earache  Skin: (-) rash, subcutaneous lesion, open ulceration      Objective: There were no vitals taken for this visit. Wt Readings from Last 3 Encounters:   06/23/20 154 lb 3.2 oz (69.9 kg)   06/20/20 155 lb (70.3 kg)   05/18/20 160 lb (72.6 kg)       PHYSICAL EXAMINATION  CONSTITUTIONAL:  awake, alert, cooperative, no apparent distress, and appears stated age  Vascular: Dorsalis pedis and posterior tibial pulses are palpable bilaterally. Skin temperature is warm to warm from proximal tibial tuberosity to distal digits. CFT wnl to exposed digits. Edema n/a. Hair growth wnl. Quality of skin well maintained. Dermatologic: skin incision appears well coapted and healed  Neurovascular: epicritic and protopathic sensations grossly intact   Musculoskeletal: Status post distal bunion reconstruction of the first metatarsal phalangeal joint. Good rectus alignment of the first metatarsal phalangeal joint with minimal pain on palpation manipulation. Imaging:  3 view foot x-ray obtained in clinic today. Patient did have good bone anatomic alignment status post distal metatarsal osteotomy with a stemmed broach implant with distal based bicortical screw and interfrag screw fixation. CBC:   Lab Results   Component Value Date    WBC 8.8 06/03/2020    HGB 12.9 06/03/2020    HCT 38.0 06/03/2020    MCV 90.7 06/03/2020     06/03/2020     BMP:   Lab Results   Component Value Date     06/03/2020    K 3.7 06/03/2020     06/03/2020    CO2 27 06/03/2020    BUN 16 06/03/2020    CREATININE 0.67 06/03/2020     PT/INR:   Lab Results   Component Value Date    PROTIME 10.5 07/10/2017    INR 1.0 07/10/2017     Prealbumin: No results found for: PREALBUMIN  Albumin:  Lab Results   Component Value Date    LABALBU 4.2 02/14/2018     Sed Rate:No results found for: Averya Manuel  CRP: No results found for: CRP  Micro: No results found for: BC   Hemoglobin A1C:   Lab Results   Component Value Date    LABA1C 5.5 02/14/2018       Assessment:      Diagnosis Orders   1. Bunion     2.  Bunion of great toe of

## 2020-08-04 ENCOUNTER — OFFICE VISIT (OUTPATIENT)
Dept: ORTHOPEDIC SURGERY | Age: 35
End: 2020-08-04
Payer: COMMERCIAL

## 2020-08-04 ENCOUNTER — HOSPITAL ENCOUNTER (OUTPATIENT)
Dept: GENERAL RADIOLOGY | Age: 35
Discharge: HOME | End: 2020-08-06
Payer: COMMERCIAL

## 2020-08-04 VITALS
WEIGHT: 160 LBS | SYSTOLIC BLOOD PRESSURE: 104 MMHG | DIASTOLIC BLOOD PRESSURE: 70 MMHG | HEART RATE: 66 BPM | HEIGHT: 65 IN | BODY MASS INDEX: 26.66 KG/M2

## 2020-08-04 PROCEDURE — 73630 X-RAY EXAM OF FOOT: CPT

## 2020-08-04 PROCEDURE — 99024 POSTOP FOLLOW-UP VISIT: CPT | Performed by: NURSE PRACTITIONER

## 2020-08-04 NOTE — LETTER
[address] of [place] [address]  Phone: [phone]  Fax: [phone]    MARVIN Zhang CNP        August 4, 2020     Patient: Nabil Bhakta   YOB: 1985   Date of Visit: 8/4/2020       To Whom It May Concern: It is my medical opinion that Nabil Bhakta may return to work on 08/11/2020. If you have any questions or concerns, please don't hesitate to call.     Sincerely,        MARVIN Zhang CNP

## 2020-08-04 NOTE — PROGRESS NOTES
[address]         Progress and Procedure Note      [address] RECORD NUMBER:  [id number]  AGE: 37 y.o. GENDER: female  : 1985  EPISODE DATE:  2020    Subjective:     Chief Complaint   Patient presents with    Post-Op Check     s/p 2020 left foot         HISTORY of PRESENT ILLNESS HPI     Sonia Martinez is a 37 y.o. female presenting for follow-up status post bunion reconstruction of the left forefoot dated 2020. Previous surgical incisions appear clinically healed at this time. Patient has been previously ambulating in an offloading pneumatic fracture boot without issue. Patient states that she did try walking in tennis shoes and she was able to do that without pain. Patient may return back to work on 2020 without restrictions. Work note given to patient at today's visit. Patient would like to follow-up with us on a as needed basis going forward. All other questions and concerns were addressed at today's visit.           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       PAST SURGICAL HISTORY    Past Surgical History:   Procedure Laterality Date    APPENDECTOMY      BACK SURGERY      BUNIONECTOMY Left 2020    BUNIONECTOMY Left 2020    Left FOOT BUNIONECTOMY stemmed implant performed by Ant Perla DPM at [address]      TONSILLECTOMY      TUBAL LIGATION      WISDOM TOOTH EXTRACTION         FAMILY HISTORY    Family History   Problem Relation Age of Onset    Heart Disease Mother     High Blood Pressure Mother     High Cholesterol Mother     Emphysema Mother     COPD Mother     Stroke Mother     Kidney Disease Father     High Blood Pressure Father     Cancer Maternal Grandmother         breast    Cancer Paternal Grandfather         lung       SOCIAL HISTORY    Social History     Tobacco Use    Smoking status: Former Smoker     Packs/day: 1.00     Years: 25.00     Pack years: 25.00     Start date: 1/1/1995    Smokeless tobacco: Never Used    Tobacco comment: vaping, trying to quit   Substance Use Topics    Alcohol use: No     Frequency: Never     Binge frequency: Never     Comment: recovering alcholic quit june 96,[id number]    Drug use: Yes     Types: Marijuana     Comment: former addict, none since 4/13/20       ALLERGIES    Allergies   Allergen Reactions    Latex      Powder in rubber gloves      Amoxicillin     Clindamycin/Lincomycin     Dust Mite Extract     Other      Pet dander    Pcn [Penicillins]     Pollen Extract        MEDICATIONS    Current Outpatient Medications on File Prior to Visit   Medication Sig Dispense Refill    nicotine (NICODERM CQ) 14 MG/24HR Place 1 patch onto the skin every 24 hours 42 patch 0    nicotine (NICODERM CQ) 7 MG/24HR Place 1 patch onto the skin every 24 hours Start after you finish the 14mg patches 30 patch 0    busPIRone (BUSPAR) 15 MG tablet Take 15 mg by mouth 3 times daily       budesonide-formoterol (SYMBICORT) 80-4.5 MCG/ACT AERO Inhale 2 puffs into the lungs 2 times daily      albuterol sulfate  (90 Base) MCG/ACT inhaler Inhale 2 puffs into the lungs every 6 hours as needed for Wheezing       No current facility-administered medications on file prior to visit.         Review of Systems  General: (-) weight change, fatigue, weakness, fever, chills, night sweats  Head: (-) trauma, heacache location, frequency, nausea, vomiting, visual changes  Eyes: (-) glasses, contact lenses, blurriness, tearing, itching, acute visual changes  Ears: (-) hearing loss, tinnitus, vertigo, discharge, earache  Skin: (-) rash, subcutaneous lesion, open ulceration      Objective: /70   Pulse 66   Ht 5' 5\" (1.651 m)   Wt 160 lb (72.6 kg)   BMI 26.63 kg/m²     Wt Readings from Last 3 Encounters:   08/04/20 160 lb (72.6 kg)   07/14/20 160 lb (72.6 kg)   06/23/20 154 lb 3.2 oz (69.9 kg)       PHYSICAL EXAMINATION  CONSTITUTIONAL:  awake, alert, cooperative, no apparent distress, and appears stated age  Vascular: Dorsalis pedis and posterior tibial pulses are palpable bilaterally. Skin temperature is warm to warm from proximal tibial tuberosity to distal digits. CFT wnl to exposed digits. Edema n/a. Hair growth wnl. Quality of skin well maintained. Dermatologic: skin incision appears well coapted and healed  Neurovascular: epicritic and protopathic sensations grossly intact   Musculoskeletal: Status post distal bunion reconstruction of the first metatarsal phalangeal joint. Good rectus alignment of the first metatarsal phalangeal joint with minimal pain on palpation manipulation. Imaging:  3 view foot x-ray obtained in clinic today. Patient did have good bone anatomic alignment status post distal metatarsal osteotomy with a stemmed broach implant with distal based bicortical screw and interfrag screw fixation.      CBC:   Lab Results   Component Value Date    WBC 8.8 06/03/2020    HGB 12.9 06/03/2020    HCT 38.0 06/03/2020    MCV 90.7 06/03/2020     06/03/2020     BMP:   Lab Results   Component Value Date     06/03/2020    K 3.7 06/03/2020     06/03/2020    CO2 27 06/03/2020    BUN 16 06/03/2020    CREATININE 0.67 06/03/2020     PT/INR:   Lab Results   Component Value Date    PROTIME 10.5 07/10/2017    INR 1.0 07/10/2017     Prealbumin: No results found for: PREALBUMIN  Albumin:  Lab Results   Component Value Date    LABALBU 4.2 02/14/2018     Sed Rate:No results found for: [address]  CRP: No results found for: CRP  Micro: No results found for: BC   Hemoglobin A1C:   Lab Results   Component Value Date    LABA1C 5.5 02/14/2018       Assessment:      Diagnosis Orders   1. Bunion of great toe of left foot            Patient Active Problem List   Diagnosis    Cyst, vagina    Hemorrhoids    Anxiety and depression    [facility] disorder ([facility]    PTSD (post-traumatic stress disorder)    Substance abuse in remission [facility])         Procedure Note  Not applicable    Plan:     Patient examined and evaluated. X-rays obtained reviewed in detail. Patient transition from offloading pneumatic fracture boot back into tennis shoes. Patient may return to work on 8/11/2020 without restrictions per patient's request.  Patient would like to follow-up with us on an as-needed basis going forward.     [place] [facility] [place]   Electronically signed by MARVIN Cali CNP on 8/4/2020 at 4:34 PM

## 2020-08-31 ENCOUNTER — HOSPITAL ENCOUNTER (OUTPATIENT)
Age: 35
Setting detail: SPECIMEN
Discharge: HOME | End: 2020-08-31
Payer: COMMERCIAL

## 2020-08-31 ENCOUNTER — OFFICE VISIT (OUTPATIENT)
Dept: PRIMARY CARE CLINIC | Age: 35
End: 2020-08-31
Payer: COMMERCIAL

## 2020-08-31 VITALS
DIASTOLIC BLOOD PRESSURE: 74 MMHG | HEIGHT: 65 IN | BODY MASS INDEX: 27.29 KG/M2 | TEMPERATURE: 96.6 F | HEART RATE: 61 BPM | SYSTOLIC BLOOD PRESSURE: 112 MMHG | WEIGHT: 163.8 LBS | OXYGEN SATURATION: 98 %

## 2020-08-31 LAB — S PYO AG THROAT QL: NORMAL

## 2020-08-31 PROCEDURE — 87880 STREP A ASSAY W/OPTIC: CPT | Performed by: PHYSICIAN ASSISTANT

## 2020-08-31 PROCEDURE — U0003 INFECTIOUS AGENT DETECTION BY NUCLEIC ACID (DNA OR RNA); SEVERE ACUTE RESPIRATORY SYNDROME CORONAVIRUS 2 (SARS-COV-2) (CORONAVIRUS DISEASE [COVID-19]), AMPLIFIED PROBE TECHNIQUE, MAKING USE OF HIGH THROUGHPUT TECHNOLOGIES AS DESCRIBED BY CMS-2020-01-R: HCPCS

## 2020-08-31 PROCEDURE — 99213 OFFICE O/P EST LOW 20 MIN: CPT | Performed by: PHYSICIAN ASSISTANT

## 2020-08-31 ASSESSMENT — ENCOUNTER SYMPTOMS
COUGH: 1
VOMITING: 0
SORE THROAT: 1

## 2020-08-31 ASSESSMENT — PATIENT HEALTH QUESTIONNAIRE - PHQ9
1. LITTLE INTEREST OR PLEASURE IN DOING THINGS: 0
SUM OF ALL RESPONSES TO PHQ QUESTIONS 1-9: 0
2. FEELING DOWN, DEPRESSED OR HOPELESS: 0
SUM OF ALL RESPONSES TO PHQ9 QUESTIONS 1 & 2: 0
SUM OF ALL RESPONSES TO PHQ QUESTIONS 1-9: 0

## 2020-08-31 NOTE — LETTER
[address] [facility]  [address] [facility]  [place] [address]  Phone: [phone]  Fax:   [place]ne [place], [place]        August 31, 2020     Patient: Myra Bowden   YOB: 1985   Date of Visit: 8/31/2020       To Whom It May Concern: It is my medical opinion that Myra Bowden should remain out of work until [facility]. If you have any questions or concerns, please don't hesitate to call.     Sincerely,        MINH Mitchell

## 2020-08-31 NOTE — PROGRESS NOTES
Subjective:      Patient ID: Hawa Esqueda is a 37 y.o. female. Pharyngitis   This is a new problem. The current episode started in the past 7 days (x 3-4 days). Associated symptoms include congestion, coughing, fatigue, headaches, myalgias and a sore throat. Pertinent negatives include no fever or vomiting. Associated symptoms comments: Loss of taste. She has tried NSAIDs for the symptoms. The treatment provided mild relief. Recent Travel Screening and Travel History documentation:     Travel Screening     Question   Response    In the last month, have you been in contact with someone who was confirmed or suspected to have Coronavirus / COVID-19? No / Unsure    Do you have any of the following symptoms? Sore throat    Have you traveled internationally in the last month? No      Travel History   Travel since 07/31/20     No documented travel since 07/31/20           Review of Systems   Constitutional: Positive for fatigue. Negative for fever. HENT: Positive for congestion and sore throat. Respiratory: Positive for cough. Gastrointestinal: Negative for vomiting. Musculoskeletal: Positive for myalgias. Neurological: Positive for headaches. Objective:   Physical Exam  HENT:      Head: Normocephalic. Right Ear: Tympanic membrane normal.      Left Ear: Tympanic membrane normal.      Nose: Rhinorrhea present. Mouth/Throat:      Mouth: Mucous membranes are moist.   Eyes:      Pupils: Pupils are equal, round, and reactive to light. Neck:      Musculoskeletal: Neck supple. Cardiovascular:      Rate and Rhythm: Normal rate. Pulmonary:      Effort: Pulmonary effort is normal.      Breath sounds: Normal breath sounds. Neurological:      General: No focal deficit present. Mental Status: She is alert and oriented to person, place, and time.    Psychiatric:         Mood and Affect: Mood normal.         Behavior: Behavior normal.         Office Visit on 08/31/2020   Component Date

## 2020-09-03 ENCOUNTER — TELEPHONE (OUTPATIENT)
Dept: FAMILY MEDICINE CLINIC | Age: 35
End: 2020-09-03

## 2020-09-03 LAB — SARS-COV-2, NAA: NOT DETECTED

## 2020-09-03 NOTE — TELEPHONE ENCOUNTER
----- Message from Lavell Thompson [place] sent at 9/3/2020  8:10 AM EDT -----  Negative COVID testing.

## 2020-11-06 ENCOUNTER — OFFICE VISIT (OUTPATIENT)
Dept: PRIMARY CARE CLINIC | Age: 35
End: 2020-11-06
Payer: COMMERCIAL

## 2020-11-06 VITALS
WEIGHT: 168.2 LBS | DIASTOLIC BLOOD PRESSURE: 62 MMHG | OXYGEN SATURATION: 98 % | TEMPERATURE: 97.8 F | HEART RATE: 62 BPM | HEIGHT: 65 IN | SYSTOLIC BLOOD PRESSURE: 98 MMHG | BODY MASS INDEX: 28.02 KG/M2

## 2020-11-06 PROCEDURE — L3908 WHO COCK-UP NONMOLDE PRE OTS: HCPCS | Performed by: FAMILY MEDICINE

## 2020-11-06 PROCEDURE — 99214 OFFICE O/P EST MOD 30 MIN: CPT | Performed by: FAMILY MEDICINE

## 2020-11-06 RX ORDER — NAPROXEN 500 MG/1
TABLET ORAL
Qty: 40 TABLET | Refills: 0 | Status: SHIPPED | OUTPATIENT
Start: 2020-11-06 | End: 2021-04-29

## 2020-11-06 ASSESSMENT — ENCOUNTER SYMPTOMS
SHORTNESS OF BREATH: 0
COLOR CHANGE: 0

## 2020-11-06 NOTE — PROGRESS NOTES
[address]  [address], [address]  [phone]      Yelena Johnson is a 38 y.o. female who is c/o of Hand Pain (bilateral- told she has carpal tunnel in the past- stated she started a new job and now it is getting worse - taking 6- 8 advil and aleve a day and it is not helping)      HPI:     Hand Pain    The incident occurred more than 1 week ago (1 month ago). The injury mechanism was repetitive motion (new job x past month). The pain is present in the left hand and right hand (R hand is worse when she wakes up, but it can switch. Worst at base of thumbs and PIP joints; sometimes in wrist). The pain has been constant since the incident. Associated symptoms include numbness (can affect whole hands, but mostly over 1st-3rd digits). Treatments tried: Advil, Aleve. 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 Symptoms will flare up when she tries to hold her phone, put on her make-up, open a jar, etc.  Hands will cramp up with this. Can wake her from sleep. Yesterday, she woke up and her R hand felt like it was \"on fire\" - had shooting pain up her R arm toward her elbow. Took a couple hours for symptoms to improve. Had to call off work. Years ago, she was told she likely had carpal tunnel syndrome due to similar symptoms with a job she had then. Never had EMG. Pt is L-handed.         Subjective:      Past Medical History:   Diagnosis Date    Anxiety     [facility] ([facility]     [facility] disease) ([facility]     Headache     Major depressive disorder     PTSD (post-traumatic stress disorder)     Substance abuse ([facility]     recovering alcoholic and drug [id number] mo sober      Past Surgical History:   Procedure Laterality Date    APPENDECTOMY      BACK SURGERY      BUNIONECTOMY Left 06/23/2020    BUNIONECTOMY Left 6/23/2020    Left FOOT BUNIONECTOMY stemmed implant performed by Elton Diez DPM at [address] [facility] [address]      TONSILLECTOMY      TUBAL LIGATION      WISDOM TOOTH EXTRACTION         Social History     Tobacco Use    Smoking status: Current Every Day Smoker     Packs/day: 0.25     Years: 25.00     Pack years: 6.25     Types: Cigarettes     Start date: 1/1/1995    Smokeless tobacco: Never Used    Tobacco comment: vaping, trying to quit   Substance Use Topics    Alcohol use: No     Frequency: Never     Binge frequency: Never     Comment: recovering alcholic quit june [id number]      Current Outpatient Medications   Medication Sig Dispense Refill    naproxen (NAPROSYN) 500 MG tablet Take 1 tab by mouth with food every 12 hours for 10 days, then every 12 hours as needed. 40 tablet 0    busPIRone (BUSPAR) 15 MG tablet Take 15 mg by mouth 3 times daily       budesonide-formoterol (SYMBICORT) 80-4.5 MCG/ACT AERO Inhale 2 puffs into the lungs 2 times daily      albuterol sulfate  (90 Base) MCG/ACT inhaler Inhale 2 puffs into the lungs every 6 hours as needed for Wheezing       No current facility-administered medications for this visit. Allergies   Allergen Reactions    Latex      Powder in rubber gloves      Clindamycin/Lincomycin     Dust Mite Extract     Other      Pet dander    Pcn [Penicillins]      Including Amoxicillin    Pollen Extract        Review of Systems   Constitutional: Negative for fever. Respiratory: Negative for shortness of breath. Musculoskeletal: Positive for joint swelling (worse in the mornings, but lasts all day long; L hand slightly worse than R). Skin: Negative for color change. Neurological: Positive for weakness (decreased ) and numbness (can affect whole hands, but mostly over 1st-3rd digits).

## 2020-11-06 NOTE — PATIENT INSTRUCTIONS
Patient Education        Carpal Tunnel Syndrome: Exercises  Introduction  Here are some examples of exercises for you to try. The exercises may be suggested for a condition or for rehabilitation. Start each exercise slowly. Ease off the exercises if you start to have pain. You will be told when to start these exercises and which ones will work best for you. Warm-up stretches  When you no longer have pain or numbness, you can do exercises to help prevent carpal tunnel syndrome from coming back. Do not do any stretch or movement that is uncomfortable or painful. 1. Rotate your wrist up, down, and from side to side. Repeat 4 times. 2. Stretch your fingers far apart. Relax them, and then stretch them again. Repeat 4 times. 3. Stretch your thumb by pulling it back gently, holding it, and then releasing it. Repeat 4 times. How to do the exercises  Prayer stretch   1. Start with your palms together in front of your chest just below your chin. 2. Slowly lower your hands toward your waistline, keeping your hands close to your stomach and your palms together until you feel a mild to moderate stretch under your forearms. 3. Hold for at least 15 to 30 seconds. Repeat 2 to 4 times. Wrist flexor stretch   1. Extend your arm in front of you with your palm up. 2. Bend your wrist, pointing your hand toward the floor. 3. With your other hand, gently bend your wrist farther until you feel a mild to moderate stretch in your forearm. 4. Hold for at least 15 to 30 seconds. Repeat 2 to 4 times. Wrist extensor stretch   1. Repeat steps 1 through 4 of the stretch above, but begin with your extended hand palm down. Follow-up care is a key part of your treatment and safety. Be sure to make and go to all appointments, and call your doctor if you are having problems. It's also a good idea to know your test results and keep a list of the medicines you take. Where can you learn more?   Go to https://chpepiceweb.[email or web address] org and sign in to your [employer] account. Enter A536 in the [place] box to learn more about \"Carpal Tunnel Syndrome: Exercises. \"     If you do not have an account, please click on the \"Sign Up Now\" link. Current as of: March 2, 2020               Content Version: 12.6  © 2006-2020 [employer]. Care instructions adapted under license by [facility] ([facility]). If you have questions about a medical condition or this instruction, always ask your healthcare professional. [place] [address] any warranty or liability for your use of this information. Patient Education        Wrist Tendinitis: Exercises  Introduction  Here are some examples of exercises for you to try. The exercises may be suggested for a condition or for rehabilitation. Start each exercise slowly. Ease off the exercises if you start to have pain. You will be told when to start these exercises and which ones will work best for you. How to do the exercises  Wrist flexion and extension   4. Place your forearm on a table, with your hand and affected wrist extended beyond the table, palm down. 5. Bend your wrist to move your hand upward and allow your hand to close into a fist, then lower your hand and allow your fingers to relax. Hold each position for about 6 seconds. 6. Repeat 8 to 12 times. Hand flips   4. While seated, place your forearm and affected wrist on your thigh, palm down. 5. Flip your hand over so the back of your hand rests on your thigh and your palm is up. Alternate between palm up and palm down while keeping your forearm on your thigh. 6. Repeat 8 to 12 times. Wrist radial and ulnar deviation   5. Hold your affected hand out in front of you, palm down. 6. Slowly bend your wrist as far as you can from side to side. Hold each position for about 6 seconds. 7. Repeat 8 to 12 times. Wrist extensor stretch   2.  Extend the arm with the affected wrist in front of you and point your fingers toward the floor. 3. With your other hand, gently bend your wrist farther until you feel a mild to moderate stretch in your forearm. 4. Hold the stretch for at least 15 to 30 seconds. 5. Repeat 2 to 4 times. 6. When you can do this stretch with ease and no pain, repeat steps 1 through 4. But this time extend your affected arm in front of you and make a fist with your palm facing down. Then bend your wrist, pointing your fist toward the floor. Wrist flexor stretch   1. Extend the arm with the affected wrist in front of you with your palm facing away from your body. 2. Bend back your wrist, pointing your hand up toward the ceiling. 3. With your other hand, gently bend your wrist farther until you feel a mild to moderate stretch in your forearm. 4. Hold the stretch for at least 15 to 30 seconds. 5. Repeat 2 to 4 times. 6. Repeat steps 1 through 5, but this time extend your affected arm in front of you with your palm facing up. Then bend back your wrist, pointing your hand toward the floor. Follow-up care is a key part of your treatment and safety. Be sure to make and go to all appointments, and call your doctor if you are having problems. It's also a good idea to know your test results and keep a list of the medicines you take. Where can you learn more? Go to https://XipinpeemilianaMyGoGameseb.[employer]. org and sign in to your [employer] account. Enter D921 in the [employer] box to learn more about \"Wrist Tendinitis: Exercises. \"     If you do not have an account, please click on the \"Sign Up Now\" link. Current as of: March 2, 2020               Content Version: 12.6  © 5856-7377 [employer]. Care instructions adapted under license by [facility] [employer] [facility] ([facility]).  If you have questions about a medical condition or this instruction, always ask your healthcare professional. [address] any warranty or liability for your use of this information.

## 2021-03-23 DIAGNOSIS — M79.672 LEFT FOOT PAIN: Primary | ICD-10-CM

## 2021-04-29 ENCOUNTER — OFFICE VISIT (OUTPATIENT)
Dept: FAMILY MEDICINE CLINIC | Age: 36
End: 2021-04-29
Payer: COMMERCIAL

## 2021-04-29 ENCOUNTER — HOSPITAL ENCOUNTER (OUTPATIENT)
Dept: LAB | Age: 36
Discharge: HOME | End: 2021-04-29
Payer: COMMERCIAL

## 2021-04-29 VITALS
WEIGHT: 174 LBS | SYSTOLIC BLOOD PRESSURE: 122 MMHG | TEMPERATURE: 99.6 F | HEIGHT: 65 IN | HEART RATE: 82 BPM | OXYGEN SATURATION: 99 % | DIASTOLIC BLOOD PRESSURE: 78 MMHG | BODY MASS INDEX: 28.99 KG/M2

## 2021-04-29 DIAGNOSIS — Z13.220 SCREENING CHOLESTEROL LEVEL: ICD-10-CM

## 2021-04-29 DIAGNOSIS — Z13.1 SCREENING FOR DIABETES MELLITUS: ICD-10-CM

## 2021-04-29 DIAGNOSIS — F31.9 BIPOLAR 1 DISORDER (HCC): Primary | ICD-10-CM

## 2021-04-29 DIAGNOSIS — J41.8 MIXED SIMPLE AND MUCOPURULENT CHRONIC BRONCHITIS (HCC): ICD-10-CM

## 2021-04-29 LAB
ALBUMIN SERPL-MCNC: 4.5 G/DL (ref 3.5–5.2)
ALBUMIN/GLOBULIN RATIO: 1.4 (ref 1–2.5)
ALP BLD-CCNC: 67 U/L (ref 35–104)
ALT SERPL-CCNC: 16 U/L (ref 5–33)
ANION GAP SERPL CALCULATED.3IONS-SCNC: 9 MMOL/L (ref 9–17)
AST SERPL-CCNC: 19 U/L
BILIRUB SERPL-MCNC: 0.7 MG/DL (ref 0.3–1.2)
BUN BLDV-MCNC: 12 MG/DL (ref 6–20)
BUN/CREAT BLD: 21 (ref 9–20)
CALCIUM SERPL-MCNC: 9.3 MG/DL (ref 8.6–10.4)
CHLORIDE BLD-SCNC: 105 MMOL/L (ref 98–107)
CHOLESTEROL/HDL RATIO: 2.8
CHOLESTEROL: 128 MG/DL
CO2: 25 MMOL/L (ref 20–31)
CREAT SERPL-MCNC: 0.57 MG/DL (ref 0.5–0.9)
ESTIMATED AVERAGE GLUCOSE: 100 MG/DL
GFR AFRICAN AMERICAN: >60 ML/MIN
GFR NON-AFRICAN AMERICAN: >60 ML/MIN
GFR SERPL CREATININE-BSD FRML MDRD: ABNORMAL ML/MIN/{1.73_M2}
GFR SERPL CREATININE-BSD FRML MDRD: ABNORMAL ML/MIN/{1.73_M2}
GLUCOSE BLD-MCNC: 97 MG/DL (ref 70–99)
HBA1C MFR BLD: 5.1 % (ref 4–6)
HDLC SERPL-MCNC: 46 MG/DL
LDL CHOLESTEROL: 72 MG/DL (ref 0–130)
POTASSIUM SERPL-SCNC: 3.7 MMOL/L (ref 3.7–5.3)
SODIUM BLD-SCNC: 139 MMOL/L (ref 135–144)
TOTAL PROTEIN: 7.7 G/DL (ref 6.4–8.3)
TRIGL SERPL-MCNC: 51 MG/DL
VLDLC SERPL CALC-MCNC: NORMAL MG/DL (ref 1–30)

## 2021-04-29 PROCEDURE — 99214 OFFICE O/P EST MOD 30 MIN: CPT | Performed by: FAMILY MEDICINE

## 2021-04-29 PROCEDURE — 80061 LIPID PANEL: CPT

## 2021-04-29 PROCEDURE — 36415 COLL VENOUS BLD VENIPUNCTURE: CPT

## 2021-04-29 PROCEDURE — 80053 COMPREHEN METABOLIC PANEL: CPT

## 2021-04-29 PROCEDURE — 83036 HEMOGLOBIN GLYCOSYLATED A1C: CPT

## 2021-04-29 RX ORDER — BUSPIRONE HYDROCHLORIDE 10 MG/1
10 TABLET ORAL 3 TIMES DAILY PRN
Qty: 90 TABLET | Refills: 2 | Status: SHIPPED | OUTPATIENT
Start: 2021-04-29 | End: 2021-07-29 | Stop reason: SDUPTHER

## 2021-04-29 RX ORDER — BUSPIRONE HYDROCHLORIDE 10 MG/1
10 TABLET ORAL 3 TIMES DAILY PRN
Qty: 90 TABLET | Refills: 2 | Status: SHIPPED | OUTPATIENT
Start: 2021-04-29 | End: 2021-04-29 | Stop reason: SDUPTHER

## 2021-04-29 ASSESSMENT — ENCOUNTER SYMPTOMS
WHEEZING: 0
CHEST TIGHTNESS: 0
SHORTNESS OF BREATH: 0
COUGH: 0
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
## 2021-05-03 ENCOUNTER — TELEPHONE (OUTPATIENT)
Dept: FAMILY MEDICINE CLINIC | Age: 36
End: 2021-05-03

## 2021-05-03 NOTE — TELEPHONE ENCOUNTER
Patient is calling and checking on the  Prior Authorization of Whitney Marie.  Please call patient or [employer] and advise

## 2021-05-06 NOTE — TELEPHONE ENCOUNTER
Advised pt that writer has resubmitted her PA, will notify pt when decision is reached. Voiced understanding.

## 2021-07-07 ENCOUNTER — OFFICE VISIT (OUTPATIENT)
Dept: PRIMARY CARE CLINIC | Age: 36
End: 2021-07-07
Payer: COMMERCIAL

## 2021-07-07 ENCOUNTER — HOSPITAL ENCOUNTER (OUTPATIENT)
Age: 36
Setting detail: SPECIMEN
Discharge: HOME | End: 2021-07-07
Payer: COMMERCIAL

## 2021-07-07 VITALS
WEIGHT: 180.6 LBS | SYSTOLIC BLOOD PRESSURE: 105 MMHG | DIASTOLIC BLOOD PRESSURE: 79 MMHG | RESPIRATION RATE: 17 BRPM | OXYGEN SATURATION: 98 % | HEART RATE: 70 BPM | HEIGHT: 65 IN | BODY MASS INDEX: 30.09 KG/M2 | TEMPERATURE: 99.6 F

## 2021-07-07 DIAGNOSIS — Z20.822 EXPOSURE TO COVID-19 VIRUS: ICD-10-CM

## 2021-07-07 DIAGNOSIS — Z20.822 PERSON UNDER INVESTIGATION FOR COVID-19: ICD-10-CM

## 2021-07-07 DIAGNOSIS — J06.9 UPPER RESPIRATORY TRACT INFECTION, UNSPECIFIED TYPE: Primary | ICD-10-CM

## 2021-07-07 DIAGNOSIS — J06.9 UPPER RESPIRATORY TRACT INFECTION, UNSPECIFIED TYPE: ICD-10-CM

## 2021-07-07 PROCEDURE — 99213 OFFICE O/P EST LOW 20 MIN: CPT | Performed by: NURSE PRACTITIONER

## 2021-07-07 PROCEDURE — U0005 INFEC AGEN DETEC AMPLI PROBE: HCPCS

## 2021-07-07 PROCEDURE — U0003 INFECTIOUS AGENT DETECTION BY NUCLEIC ACID (DNA OR RNA); SEVERE ACUTE RESPIRATORY SYNDROME CORONAVIRUS 2 (SARS-COV-2) (CORONAVIRUS DISEASE [COVID-19]), AMPLIFIED PROBE TECHNIQUE, MAKING USE OF HIGH THROUGHPUT TECHNOLOGIES AS DESCRIBED BY CMS-2020-01-R: HCPCS

## 2021-07-07 ASSESSMENT — ENCOUNTER SYMPTOMS
SINUS COMPLAINT: 1
CHEST TIGHTNESS: 0
SINUS PRESSURE: 1
RHINORRHEA: 0
NAUSEA: 1
WHEEZING: 0
VOMITING: 0
SHORTNESS OF BREATH: 0
DIARRHEA: 0
SORE THROAT: 1
COUGH: 1

## 2021-07-07 ASSESSMENT — PATIENT HEALTH QUESTIONNAIRE - PHQ9
SUM OF ALL RESPONSES TO PHQ QUESTIONS 1-9: 0
SUM OF ALL RESPONSES TO PHQ9 QUESTIONS 1 & 2: 0
2. FEELING DOWN, DEPRESSED OR HOPELESS: 0
SUM OF ALL RESPONSES TO PHQ QUESTIONS 1-9: 0
1. LITTLE INTEREST OR PLEASURE IN DOING THINGS: 0
SUM OF ALL RESPONSES TO PHQ QUESTIONS 1-9: 0
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
## 2021-07-07 NOTE — LETTER
[address] [facility] A department of [facility]  Phone: [phone]  Fax: Taina [address]., APRN - JULIET        July 7, 2021     Patient: Kaylin Ortega   YOB: 1985   Date of Visit: 7/7/2021       To Whom it May Concern:    Kaylin Ortega was seen in my clinic on 7/7/2021. May return to work with negative Covid-19 test result and improved symptoms. Test result in 2-4 days. If you have any questions or concerns, please don't hesitate to call.     Sincerely,         MARVIN Catalan - CNP

## 2021-07-07 NOTE — PATIENT INSTRUCTIONS
"Subjective:       Patient ID: Jose Manuel Boyd is a 77 y.o. female.    Chief Complaint: Hypertension; Follow-up; and Hoarse    F/u blood pressure and headaches    HPI: 78 y/o seen last month after ED visit for frontal headache in setting of elevated blood pressure. She reports home reading have been better sicne last visit with systolics mostly in 120s. No further headaches no vision changes. Overall feels well she is planning a trip to [place] later this month to visit her sisters      Review of Systems   Constitutional: Negative for activity change, appetite change, fatigue, fever and unexpected weight change.   HENT: Negative for ear pain, rhinorrhea and sore throat.    Eyes: Negative for discharge and visual disturbance.   Respiratory: Negative for chest tightness, shortness of breath and wheezing.    Cardiovascular: Negative for chest pain, palpitations and leg swelling.   Gastrointestinal: Negative for abdominal pain, constipation and diarrhea.   Endocrine: Negative for cold intolerance and heat intolerance.   Genitourinary: Negative for dysuria and hematuria.   Musculoskeletal: Negative for joint swelling and neck stiffness.   Skin: Negative for rash.   Neurological: Negative for dizziness, syncope, weakness and headaches.   Psychiatric/Behavioral: Negative for suicidal ideas.       Objective:     Vitals:    09/15/17 1118 09/15/17 1211   BP: (!) 146/98 136/84   BP Location: Right arm    Patient Position: Sitting    BP Method: Medium (Manual)    Pulse: 85 80   Resp: 17    Temp: 97.8 °F (36.6 °C)    TempSrc: Oral    SpO2: 96%    Weight: 101 kg (222 lb 10.6 oz)    Height: 4' 11" (1.499 m)           Physical Exam   Constitutional: She is oriented to person, place, and time. She appears well-developed and well-nourished.   HENT:   Head: Normocephalic and atraumatic.   Eyes: Conjunctivae are normal. Pupils are equal, round, and reactive to light.   Neck: Normal range of motion.   Cardiovascular: Normal rate " Patient Education        Learning About Coronavirus [phone])  What is coronavirus (COVID-19)? COVID-19 is a disease caused by a new type of coronavirus. This illness was first found in December 2019. It has since spread worldwide. Coronaviruses are a large group of viruses. They cause the common cold. They also cause more serious illnesses like [place] East respiratory syndrome (MERS) and severe acute respiratory syndrome (SARS). COVID-19 is caused by a novel coronavirus. That means it's a new type that has not been seen in people before. What are the symptoms? Coronavirus (COVID-19) symptoms may include:  · Fever. · Cough. · Trouble breathing. · Chills or repeated shaking with chills. · Muscle pain. · Headache. · Sore throat. · New loss of taste or smell. · Vomiting. · Diarrhea. In severe cases, COVID-19 can cause pneumonia and make it hard to breathe without help from a machine. It can cause death. How is it diagnosed? COVID-19 is diagnosed with a viral test. This may also be called a PCR test or antigen test. It looks for evidence of the virus in your breathing passages or lungs (respiratory system). The test is most often done on a sample from the nose, throat, or lungs. It's sometimes done on a sample of saliva. One way a sample is collected is by putting a long swab into the back of your nose. How is it treated? Mild cases of COVID-19 can be treated at home. Serious cases need treatment in the hospital. Treatment may include medicines to reduce symptoms, plus breathing support such as oxygen therapy or a ventilator. Some people may be placed on their belly to help their oxygen levels. Treatments that may help people who have COVID-19 include:  Antiviral medicines. These medicines treat viral infections. Remdesivir is an example. Immune-based therapy. These medicines help the immune system fight COVID-19. One example is bamlanivimab. It's a monoclonal antibody. Blood thinners. These medicines help prevent blood clots. People with severe illness are at risk for blood clots. How can you protect yourself and others? The best way to protect yourself from getting sick is to:  · Avoid areas where there is an outbreak. · Avoid contact with people who may be infected. · Avoid crowds and try to stay at least 6 feet away from other people. · Wash your hands often, especially after you cough or sneeze. Use soap and water, and scrub for at least 20 seconds. If soap and water aren't available, use an alcohol-based hand . · Avoid touching your mouth, nose, and eyes. To help avoid spreading the virus to others:  · Stay home if you are sick or have been exposed to the virus. Don't go to school, work, or public areas. And don't use public transportation, ride-shares, or taxis unless you have no choice. · Wear a cloth face cover if you have to go to public areas. · Cover your mouth with a tissue when you cough or sneeze. Then throw the tissue in the trash and wash your hands right away. · If you're sick:  ? Leave your home only if you need to get medical care. But call the doctor's office first so they know you're coming. And wear a face cover. ? Wear the face cover whenever you're around other people. It can help stop the spread of the virus. ? Limit contact with pets and people in your home. If possible, stay in a separate bedroom and use a separate bathroom. ? Clean and disinfect your home every day. Use household  and disinfectant wipes or sprays. Take special care to clean things that you grab with your hands. These include doorknobs, remote controls, phones, and handles on your refrigerator and microwave. And don't forget countertops, tabletops, bathrooms, and computer keyboards. When should you call for help? Call 911 anytime you think you may need emergency care.  For example, call if you have life-threatening symptoms, such as:    · You have severe trouble and regular rhythm.  Exam reveals no gallop and no friction rub.    No murmur heard.  Pulmonary/Chest: Effort normal and breath sounds normal. She has no wheezes. She has no rales.   Abdominal: Soft. Bowel sounds are normal. There is no tenderness. There is no rebound and no guarding.   Musculoskeletal: Normal range of motion. She exhibits no edema or tenderness.   Neurological: She is alert and oriented to person, place, and time. No cranial nerve deficit.   Skin: Skin is warm and dry.   Psychiatric: She has a normal mood and affect.       Assessment:       1. Hypertension, essential        Plan:    BP at goal continue current medications     F/u three months        breathing. (You can't talk at all.)     · You have constant chest pain or pressure.     · You are severely dizzy or lightheaded.     · You are confused or can't think clearly.     · Your face and lips have a blue color.     · You pass out (lose consciousness) or are very hard to wake up. Call your doctor now or seek immediate medical care if:    · You have moderate trouble breathing. (You can't speak a full sentence.)     · You are coughing up blood (more than about 1 teaspoon).     · You have signs of low blood pressure. These include feeling lightheaded; being too weak to stand; and having cold, pale, clammy skin. Watch closely for changes in your health, and be sure to contact your doctor if:    · Your symptoms get worse.     · You are not getting better as expected. Call before you go to the doctor's office. Follow their instructions. And wear a cloth face cover. Current as of: March 26, 2021               Content Version: 12.9  © 2006-2021 [employer]. Care instructions adapted under license by [facility] ([facility]). If you have questions about a medical condition or this instruction, always ask your healthcare professional. [place] [address] any warranty or liability for your use of this information. Patient Education        Coronavirus ([id number]): Care Instructions  Overview  The coronavirus disease (COVID-19) is caused by a virus. Symptoms may include a fever, a cough, and shortness of breath. It mainly spreads person-to-person through droplets from coughing and sneezing. The virus also can spread when people are in close contact with someone who is infected. Most people have mild symptoms and can take care of themselves at home. If their symptoms get worse, they may need care in a hospital. Treatment may include medicines to reduce symptoms, plus breathing support such as oxygen therapy or a ventilator. It's important to not spread the virus to others.  If you have 2021               Content Version: 12.9  © 2006-2021 [employer]. Care instructions adapted under license by [facility] ([facility]). If you have questions about a medical condition or this instruction, always ask your healthcare professional. [address] any warranty or liability for your use of this information. Patient Education        Viral Respiratory Infection: Care Instructions  Your Care Instructions     Viruses are very small organisms. They grow in number after they enter your body. There are many types that cause different illnesses, such as colds and the mumps. The symptoms of a viral respiratory infection often start quickly. They include a fever, sore throat, and runny nose. You may also just not feel well. Or you may not want to eat much. Most viral respiratory infections are not serious. They usually get better with time and self-care. Antibiotics are not used to treat a viral infection. That's because antibiotics will not help cure a viral illness. In some cases, antiviral medicine can help your body fight a serious viral infection. Follow-up care is a key part of your treatment and safety. Be sure to make and go to all appointments, and call your doctor if you are having problems. It's also a good idea to know your test results and keep a list of the medicines you take. How can you care for yourself at home? · Rest as much as possible until you feel better. · Be safe with medicines. Take your medicine exactly as prescribed. Call your doctor if you think you are having a problem with your medicine. You will get more details on the specific medicine your doctor prescribes. · Take an over-the-counter pain medicine, such as acetaminophen (Tylenol), ibuprofen (Advil, Motrin), or naproxen (Aleve), as needed for pain and fever. Read and follow all instructions on the label. Do not give aspirin to anyone younger than 20.  It has been linked to Reye syndrome, a serious illness. · Drink plenty of fluids. Hot fluids, such as tea or soup, may help relieve congestion in your nose and throat. If you have kidney, heart, or liver disease and have to limit fluids, talk with your doctor before you increase the amount of fluids you drink. · Try to clear mucus from your lungs by breathing deeply and coughing. · Gargle with warm salt water once an hour. This can help reduce swelling and throat pain. Use 1 teaspoon of salt mixed in 1 cup of warm water. · Do not smoke or allow others to smoke around you. If you need help quitting, talk to your doctor about stop-smoking programs and medicines. These can increase your chances of quitting for good. To avoid spreading the virus  · Cough or sneeze into a tissue. Then throw the tissue away. · If you don't have a tissue, use your hand to cover your cough or sneeze. Then clean your hand. You can also cough into your sleeve. · Wash your hands often. Use soap and warm water. Wash for 15 to 20 seconds each time. · If you don't have soap and water near you, you can clean your hands with alcohol wipes or gel. When should you call for help? Call your doctor now or seek immediate medical care if:    · You have a new or higher fever.     · Your fever lasts more than 48 hours.     · You have trouble breathing.     · You have a fever with a stiff neck or a severe headache.     · You are sensitive to light.     · You feel very sleepy or confused. Watch closely for changes in your health, and be sure to contact your doctor if:    · You do not get better as expected. Where can you learn more? Go to https://[employer]. org and sign in to your [employer] account. Enter D224 in the [employer] box to learn more about \"Viral Respiratory Infection: Care Instructions. \"     If you do not have an account, please click on the \"Sign Up Now\" link.   Current as of: October 26, 2020               Content Version: 12.9  © 2908-8802 [employer]. Care instructions adapted under license by [facility] If you have questions about a medical condition or this instruction, always ask your healthcare professional. [address] any warranty or liability for your use of this information. Will notify you of COVID test results as soon as available. You should isoloate at home in an area away from family. If you must be around family members, please wear a mask. Quarantine at home until result is available. This means do not go to work/school, attend family gatherings, or invite others to your home until you know your test results.

## 2021-07-08 LAB
SARS-COV-2: ABNORMAL
SARS-COV-2: DETECTED
SOURCE: ABNORMAL

## 2021-07-14 ENCOUNTER — VIRTUAL VISIT (OUTPATIENT)
Dept: PRIMARY CARE CLINIC | Age: 36
End: 2021-07-14
Payer: COMMERCIAL

## 2021-07-14 DIAGNOSIS — K02.9 PAIN DUE TO DENTAL CARIES: Primary | ICD-10-CM

## 2021-07-14 PROCEDURE — 99213 OFFICE O/P EST LOW 20 MIN: CPT | Performed by: FAMILY MEDICINE

## 2021-07-14 RX ORDER — DOXYCYCLINE HYCLATE 100 MG/1
100 CAPSULE ORAL 2 TIMES DAILY
Qty: 20 CAPSULE | Refills: 0 | Status: SHIPPED | OUTPATIENT
Start: 2021-07-14 | End: 2021-07-24

## 2021-07-14 ASSESSMENT — ENCOUNTER SYMPTOMS
EYES NEGATIVE: 1
GASTROINTESTINAL NEGATIVE: 1
COUGH: 1

## 2021-07-14 NOTE — PROGRESS NOTES
Subjective:      Patient ID: Hiro Garcias is a 38 y.o. female. HPI   Acute urgent care visit by video teleconference for patient with recent covid positive test 7/7/21, now with lower dental pain. Still under quarantine. Feeling fine with the covid except loss of smell. Mild cough and sinus congestion. Video teleconference visit scheduled today during covid 19 restrictions. Patient consented to use personal cell phone to connect the visit at home, alone. I am in the office using [employer] software to connect. Patient reports progressing dental pain along the lower jaw. She cant wear her partial over the area due to pain. She has an appointment with dentist, but not until august.        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     Past Surgical History:   Procedure Laterality Date    APPENDECTOMY      BACK SURGERY      BUNIONECTOMY Left 06/23/2020    BUNIONECTOMY Left 6/23/2020    Left FOOT BUNIONECTOMY stemmed implant performed by Tonya Davies DPM at [address] [place] [address]      TONSILLECTOMY      TUBAL LIGATION      WISDOM TOOTH EXTRACTION       Current Outpatient Medications   Medication Sig Dispense Refill    fluticasone-umeclidin-vilant (TRELEGY ELLIPTA) 100-62.5-25 MCG/INH AEPB Inhale 1 puff into the lungs daily 30 each 5    cariprazine hcl (VRAYLAR) 1.5 MG capsule Take 1 capsule by mouth daily 30 capsule 3    busPIRone (BUSPAR) 10 MG tablet Take 1 tablet by mouth 3 times daily as needed (anxiety) 90 tablet 2     No current facility-administered medications for this visit.      Allergies   Allergen Reactions    Latex      Powder in rubber gloves      Clindamycin/Lincomycin     Dust Mite Extract     Other      Pet dander    Pcn [Penicillins]      Including Amoxicillin    Pollen Extract        Review of Systems   Constitutional: Negative. Negative for fever. HENT: Positive for congestion and dental problem. Eyes: Negative. Respiratory: Positive for cough. Cardiovascular: Negative. Gastrointestinal: Negative. Genitourinary: Negative. Musculoskeletal: Negative. Skin: Negative. Neurological: Negative. Psychiatric/Behavioral: Negative. Objective:   Physical Exam  Constitutional:       General: She is not in acute distress. Appearance: Normal appearance. She is not toxic-appearing. HENT:      Head: Normocephalic and atraumatic. Mouth/Throat:     Pulmonary:      Effort: Pulmonary effort is normal.   Neurological:      Mental Status: She is alert. Mental status is at baseline. Psychiatric:         Mood and Affect: Mood normal.         Behavior: Behavior normal.         Thought Content: Thought content normal.         Judgment: Judgment normal.       Patient-Reported Vitals 7/14/2021   Patient-Reported Weight 175lbs   Patient-Reported Height 5ft 5in        Assessment:      Dental pain        Plan:      Doxycycline 100mg bid x 10 days  Contl.  advil prn        Yolette Duggan MD

## 2021-07-28 NOTE — TELEPHONE ENCOUNTER
Patient came to window stating she spoke with the pharmacy regarding refills, and the pharmacy stated a refill request had been sent over last week. No refill requests on file, and patient is now out of her medication. Would like sent to loaded pharmacy.

## 2021-07-28 NOTE — TELEPHONE ENCOUNTER
Jaziel Watts called requesting a refill of the below medication which has been pended for you:     Requested Prescriptions     Pending Prescriptions Disp Refills    busPIRone (BUSPAR) 10 MG tablet 90 tablet 2     Sig: Take 1 tablet by mouth 3 times daily as needed (anxiety)    cariprazine hcl (VRAYLAR) 1.5 MG capsule 30 capsule 3     Sig: Take 1 capsule by mouth daily       Last Appointment Date: 4/29/2021  Next Appointment Date: 9/24/2021    Allergies   Allergen Reactions    Latex      Powder in rubber gloves      Clindamycin/Lincomycin     Dust Mite Extract     Other      Pet dander    Pcn [Penicillins]      Including Amoxicillin    Pollen Extract

## 2021-07-29 RX ORDER — BUSPIRONE HYDROCHLORIDE 10 MG/1
10 TABLET ORAL 3 TIMES DAILY PRN
Qty: 90 TABLET | Refills: 2 | Status: SHIPPED | OUTPATIENT
Start: 2021-07-29 | End: 2021-08-19 | Stop reason: SDUPTHER

## 2021-08-08 ENCOUNTER — APPOINTMENT (OUTPATIENT)
Dept: CT IMAGING | Age: 36
End: 2021-08-08
Payer: COMMERCIAL

## 2021-08-08 ENCOUNTER — HOSPITAL ENCOUNTER (EMERGENCY)
Age: 36
Discharge: HOME | End: 2021-08-08
Attending: EMERGENCY MEDICINE
Payer: COMMERCIAL

## 2021-08-08 VITALS
OXYGEN SATURATION: 98 % | DIASTOLIC BLOOD PRESSURE: 64 MMHG | SYSTOLIC BLOOD PRESSURE: 111 MMHG | TEMPERATURE: 98.6 F | RESPIRATION RATE: 19 BRPM | HEART RATE: 64 BPM

## 2021-08-08 DIAGNOSIS — R07.89 ATYPICAL CHEST PAIN: Primary | ICD-10-CM

## 2021-08-08 LAB
ABSOLUTE EOS #: 0.63 K/UL (ref 0–0.44)
ABSOLUTE IMMATURE GRANULOCYTE: <0.03 K/UL (ref 0–0.3)
ABSOLUTE LYMPH #: 2.44 K/UL (ref 1.1–3.7)
ABSOLUTE MONO #: 0.64 K/UL (ref 0.1–1.2)
ALBUMIN SERPL-MCNC: 4.2 G/DL (ref 3.5–5.2)
ALBUMIN/GLOBULIN RATIO: 1.3 (ref 1–2.5)
ALP BLD-CCNC: 77 U/L (ref 35–104)
ALT SERPL-CCNC: 12 U/L (ref 5–33)
ANION GAP SERPL CALCULATED.3IONS-SCNC: 11 MMOL/L (ref 9–17)
AST SERPL-CCNC: 15 U/L
BASOPHILS # BLD: 1 % (ref 0–2)
BASOPHILS ABSOLUTE: 0.09 K/UL (ref 0–0.2)
BILIRUB SERPL-MCNC: 0.66 MG/DL (ref 0.3–1.2)
BILIRUBIN DIRECT: 0.14 MG/DL
BILIRUBIN, INDIRECT: 0.52 MG/DL (ref 0–1)
BNP INTERPRETATION: NORMAL
BUN BLDV-MCNC: 14 MG/DL (ref 6–20)
BUN/CREAT BLD: 24 (ref 9–20)
CALCIUM SERPL-MCNC: 9.1 MG/DL (ref 8.6–10.4)
CHLORIDE BLD-SCNC: 103 MMOL/L (ref 98–107)
CO2: 24 MMOL/L (ref 20–31)
CREAT SERPL-MCNC: 0.59 MG/DL (ref 0.5–0.9)
D-DIMER QUANTITATIVE: 0.33 MG/L FEU (ref 0–0.59)
DIFFERENTIAL TYPE: ABNORMAL
EKG ATRIAL RATE: 52 BPM
EKG P AXIS: 47 DEGREES
EKG P-R INTERVAL: 148 MS
EKG Q-T INTERVAL: 414 MS
EKG QRS DURATION: 76 MS
EKG QTC CALCULATION (BAZETT): 385 MS
EKG R AXIS: 40 DEGREES
EKG T AXIS: 23 DEGREES
EKG VENTRICULAR RATE: 52 BPM
EOSINOPHILS RELATIVE PERCENT: 8 % (ref 1–4)
GFR AFRICAN AMERICAN: >60 ML/MIN
GFR NON-AFRICAN AMERICAN: >60 ML/MIN
GFR SERPL CREATININE-BSD FRML MDRD: ABNORMAL ML/MIN/{1.73_M2}
GFR SERPL CREATININE-BSD FRML MDRD: ABNORMAL ML/MIN/{1.73_M2}
GLOBULIN: 3.2 G/DL (ref 1.5–3.8)
GLUCOSE BLD-MCNC: 89 MG/DL (ref 70–99)
HCT VFR BLD CALC: 39.3 % (ref 36.3–47.1)
HEMOGLOBIN: 13.5 G/DL (ref 11.9–15.1)
IMMATURE GRANULOCYTES: 0 %
LACTATE DEHYDROGENASE: 145 U/L (ref 135–214)
LYMPHOCYTES # BLD: 31 % (ref 24–43)
MCH RBC QN AUTO: 31.3 PG (ref 25.2–33.5)
MCHC RBC AUTO-ENTMCNC: 34.4 G/DL (ref 25.2–33.5)
MCV RBC AUTO: 91 FL (ref 82.6–102.9)
MONOCYTES # BLD: 8 % (ref 3–12)
NRBC AUTOMATED: 0 PER 100 WBC
PDW BLD-RTO: 13.7 % (ref 11.8–14.4)
PLATELET # BLD: 264 K/UL (ref 138–453)
PLATELET ESTIMATE: ABNORMAL
PMV BLD AUTO: 9.6 FL (ref 8.1–13.5)
POTASSIUM SERPL-SCNC: 3.8 MMOL/L (ref 3.7–5.3)
PRO-BNP: 52 PG/ML
RBC # BLD: 4.32 M/UL (ref 3.95–5.11)
RBC # BLD: ABNORMAL 10*6/UL
SEDIMENTATION RATE, ERYTHROCYTE: 13 MM (ref 0–20)
SEG NEUTROPHILS: 52 % (ref 36–65)
SEGMENTED NEUTROPHILS ABSOLUTE COUNT: 4.04 K/UL (ref 1.5–8.1)
SODIUM BLD-SCNC: 138 MMOL/L (ref 135–144)
TOTAL PROTEIN: 7.4 G/DL (ref 6.4–8.3)
TROPONIN INTERP: NORMAL
TROPONIN T: NORMAL NG/ML
TROPONIN, HIGH SENSITIVITY: <6 NG/L (ref 0–14)
WBC # BLD: 7.9 K/UL (ref 3.5–11.3)
WBC # BLD: ABNORMAL 10*3/UL

## 2021-08-08 PROCEDURE — 85651 RBC SED RATE NONAUTOMATED: CPT

## 2021-08-08 PROCEDURE — 80076 HEPATIC FUNCTION PANEL: CPT

## 2021-08-08 PROCEDURE — 6360000004 HC RX CONTRAST MEDICATION: Performed by: EMERGENCY MEDICINE

## 2021-08-08 PROCEDURE — 93005 ELECTROCARDIOGRAM TRACING: CPT | Performed by: EMERGENCY MEDICINE

## 2021-08-08 PROCEDURE — 99284 EMERGENCY DEPT VISIT MOD MDM: CPT

## 2021-08-08 PROCEDURE — 2709999900 CT CHEST W CONTRAST

## 2021-08-08 PROCEDURE — 85025 COMPLETE CBC W/AUTO DIFF WBC: CPT

## 2021-08-08 PROCEDURE — 85379 FIBRIN DEGRADATION QUANT: CPT

## 2021-08-08 PROCEDURE — 84484 ASSAY OF TROPONIN QUANT: CPT

## 2021-08-08 PROCEDURE — 80048 BASIC METABOLIC PNL TOTAL CA: CPT

## 2021-08-08 PROCEDURE — 83615 LACTATE (LD) (LDH) ENZYME: CPT

## 2021-08-08 PROCEDURE — 86140 C-REACTIVE PROTEIN: CPT

## 2021-08-08 PROCEDURE — 83880 ASSAY OF NATRIURETIC PEPTIDE: CPT

## 2021-08-08 RX ADMIN — IOPAMIDOL 75 ML: 755 INJECTION, SOLUTION INTRAVENOUS at 15:26

## 2021-08-08 ASSESSMENT — PAIN DESCRIPTION - LOCATION: LOCATION: CHEST

## 2021-08-08 ASSESSMENT — PAIN DESCRIPTION - FREQUENCY: FREQUENCY: CONTINUOUS

## 2021-08-08 ASSESSMENT — PAIN SCALES - GENERAL: PAINLEVEL_OUTOF10: 6

## 2021-08-08 ASSESSMENT — PAIN DESCRIPTION - ONSET: ONSET: ON-GOING

## 2021-08-08 ASSESSMENT — PAIN DESCRIPTION - ORIENTATION: ORIENTATION: LEFT

## 2021-08-08 ASSESSMENT — PAIN DESCRIPTION - PAIN TYPE: TYPE: ACUTE PAIN

## 2021-08-08 ASSESSMENT — PAIN DESCRIPTION - DESCRIPTORS: DESCRIPTORS: PRESSURE

## 2021-08-08 ASSESSMENT — PAIN DESCRIPTION - PROGRESSION: CLINICAL_PROGRESSION: GRADUALLY WORSENING

## 2021-08-08 NOTE — ED PROVIDER NOTES
[facility] ED  EMERGENCY DEPARTMENT ENCOUNTER      Pt Name: Yudith Williamson  MRN: [id number]  [place] 1985  Date of evaluation: 8/8/2021  Provider: Jacklyn Okeefe MD    CHIEF COMPLAINT     Chief Complaint   Patient presents with    Chest Pain     left side, intermittent for the past week, continuous since approx 11 this morning, pain increases with bending over and deep breaths    Palpitations     intermittent for the past week         HISTORY OF PRESENT ILLNESS   (Location/Symptom, Timing/Onset, Context/Setting,Quality, Duration, Modifying Factors, Severity)  Note limiting factors. Yudith Williamson is a 38 y.o. female who presents to the emergency department With a chief complaint of pain in her chest that she has been experiencing intermittently for the last 1 week. Pain is worse when she leans forward. It became more persistent today so she came in to get checked out. She denies chills or fever. She was exposed to Covid at work and was tested 4 weeks ago and was positive for Covid. She had a very mild illness and denies any symptoms. She is a cigarette smoker and has a history of asthma but has not been using her inhaler regularly. She has not had the Covid vaccine. The history is provided by the patient and medical records. Nursing Notes werereviewed. REVIEW OF SYSTEMS    (2-9 systems for level 4, 10 or more for level 5)     Review of Systems   All other systems reviewed and are negative. Except as noted above the remainder of the review of systems was reviewed and negative.        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         SURGICALHISTORY       Past Surgical History:   Procedure Laterality Date    APPENDECTOMY      BACK SURGERY      BUNIONECTOMY Left 06/23/2020    BUNIONECTOMY Left 6/23/2020    Left FOOT BUNIONECTOMY stemmed implant performed by David George DPM [address]      TONSILLECTOMY      TUBAL LIGATION      WISDOM TOOTH EXTRACTION           CURRENT MEDICATIONS       Previous Medications    BUSPIRONE (BUSPAR) 10 MG TABLET    Take 1 tablet by mouth 3 times daily as needed (anxiety)    CARIPRAZINE HCL (VRAYLAR) 1.5 MG CAPSULE    Take 1 capsule by mouth daily    FLUTICASONE-UMECLIDIN-VILANT (TRELEGY ELLIPTA) 100-62.5-25 MCG/INH AEPB    Inhale 1 puff into the lungs daily       ALLERGIES     Latex, Clindamycin/lincomycin, Dust mite extract, Other, Pcn [penicillins], and Pollen extract    FAMILY HISTORY       Family History   Problem Relation Age of Onset    Heart Disease Mother     High Blood Pressure Mother     High Cholesterol Mother     Emphysema Mother     COPD Mother     Stroke Mother     Kidney Disease Father     High Blood Pressure Father     Cancer Maternal Grandmother         breast    Cancer Paternal Grandfather         lung          SOCIAL HISTORY       Social History     Socioeconomic History    Marital status: Legally      Spouse name: None    Number of children: None    Years of education: None    Highest education level: None   Occupational History    None   Tobacco Use    Smoking status: Current Every Day Smoker     Packs/day: 0.25     Years: 25.00     Pack years: 6.25     Types: Cigarettes     Start date: 1/1/1995    Smokeless tobacco: Never Used    Tobacco comment: vaping, trying to quit   Vaping Use    Vaping Use: Every day    Substances: Nicotine    Devices: Disposable   Substance and Sexual Activity    Alcohol use: No     Comment: recovering alcholic quit june 28, 3289    Drug use: Yes     Types: Marijuana     Comment: former addict, none since 4/13/20    Sexual activity: Yes     Partners: Male   Other Topics Concern    None   Social History Narrative    None     Social Determinants of Health     Financial Resource Strain:     Difficulty of Paying Living Expenses:    Food Insecurity:     Worried About Running Out of Food in the Last Year:     Ran Out of Food in the Last Year:    Transportation Needs:     Lack of Transportation (Medical):  Lack of Transportation (Non-Medical):    Physical Activity:     Days of Exercise per Week:     Minutes of Exercise per Session:    Stress:     Feeling of Stress :    Social Connections:     Frequency of Communication with Friends and Family:     Frequency of Social Gatherings with Friends and Family:     Attends Holiness Services:     Active Member of Clubs or Organizations:     Attends Club or Organization Meetings:     Marital Status:    Intimate Partner Violence:     Fear of Current or Ex-Partner:     Emotionally Abused:     Physically Abused:     Sexually Abused:        SCREENINGS             PHYSICAL EXAM    (up to 7 for level 4, 8 or more for level 5)     ED Triage Vitals [08/08/21 1330]   BP Temp Temp Source Pulse Resp SpO2 Height Weight   111/84 98.6 °F (37 °C) Tympanic 53 14 99 % -- --       Physical Exam  Vitals reviewed. Constitutional:       General: She is not in acute distress. Appearance: She is not ill-appearing. HENT:      Head: Normocephalic. Right Ear: External ear normal.      Left Ear: External ear normal.      Nose: Nose normal.   Eyes:      Extraocular Movements: Extraocular movements intact. Cardiovascular:      Rate and Rhythm: Regular rhythm. Bradycardia present. Heart sounds: Normal heart sounds. Pulmonary:      Breath sounds: Examination of the right-upper field reveals rhonchi. Examination of the left-upper field reveals rhonchi. Examination of the right-middle field reveals rhonchi. Examination of the left-middle field reveals rhonchi.  Examination of the right-lower field reveals rhonchi. Examination of the left-lower field reveals rhonchi. Decreased breath sounds and rhonchi present. Musculoskeletal:         General: Normal range of motion. Cervical back: Neck supple. Skin:     General: Skin is warm and dry. Neurological:      General: No focal deficit present. Mental Status: She is alert and oriented to person, place, and time. DIAGNOSTIC RESULTS     EKG: All EKG's are interpreted by the Emergency Department Physician who either signs orCo-signs this chart in the absence of a cardiologist.    Sinus bradycardia with a rate of 52 beats a minute. Normal axis. No acute findings. RADIOLOGY:     Interpretation per the Radiologist below, ifavailable at the time of this note:    CT CHEST W CONTRAST   Final Result   1. No CT findings of acute pulmonary disease. 2. Anterior mediastinal thymic gland type appearance typically seen with   thymic gland rebound hyperplasia and less commonly thymic gland neoplasia. Follow-up with IV contrast-enhanced CT chest in 6 months recommended. RECOMMENDATIONS:   IV contrast-enhanced CT chest in 6 months               ED BEDSIDE ULTRASOUND:   Performed by ED Physician - none    LABS:  Labs Reviewed   CBC WITH AUTO DIFFERENTIAL - Abnormal; Notable for the following components:       Result Value    MCHC 34.4 (*)     Eosinophils % 8 (*)     Absolute Eos # 0.63 (*)     All other components within normal limits   BASIC METABOLIC PANEL - Abnormal; Notable for the following components:    Bun/Cre Ratio 24 (*)     All other components within normal limits   TROPONIN   BRAIN NATRIURETIC PEPTIDE   D-DIMER, QUANTITATIVE   HEPATIC FUNCTION PANEL   LACTATE DEHYDROGENASE   SEDIMENTATION RATE   C-REACTIVE PROTEIN       All other labs were within normal range ornot returned as of this dictation.     EMERGENCY DEPARTMENT COURSE and DIFFERENTIAL DIAGNOSIS/MDM:   Vitals:    Vitals:    08/08/21 1330   BP: 111/84   Pulse: [address]

## 2021-08-09 LAB — C-REACTIVE PROTEIN: <3 MG/L (ref 0–5)

## 2021-08-10 ENCOUNTER — APPOINTMENT (OUTPATIENT)
Dept: GENERAL RADIOLOGY | Age: 36
End: 2021-08-10
Payer: COMMERCIAL

## 2021-08-10 ENCOUNTER — HOSPITAL ENCOUNTER (EMERGENCY)
Age: 36
Discharge: HOME | End: 2021-08-10
Attending: EMERGENCY MEDICINE
Payer: COMMERCIAL

## 2021-08-10 VITALS
OXYGEN SATURATION: 97 % | HEART RATE: 74 BPM | HEIGHT: 65 IN | SYSTOLIC BLOOD PRESSURE: 91 MMHG | DIASTOLIC BLOOD PRESSURE: 55 MMHG | TEMPERATURE: 97.7 F | RESPIRATION RATE: 18 BRPM | BODY MASS INDEX: 29.16 KG/M2 | WEIGHT: 175 LBS

## 2021-08-10 DIAGNOSIS — R07.89 OTHER CHEST PAIN: Primary | ICD-10-CM

## 2021-08-10 LAB
ABSOLUTE EOS #: 0.57 K/UL (ref 0–0.44)
ABSOLUTE IMMATURE GRANULOCYTE: <0.03 K/UL (ref 0–0.3)
ABSOLUTE LYMPH #: 3.01 K/UL (ref 1.1–3.7)
ABSOLUTE MONO #: 0.61 K/UL (ref 0.1–1.2)
ALBUMIN SERPL-MCNC: 4 G/DL (ref 3.5–5.2)
ALBUMIN/GLOBULIN RATIO: 1.2 (ref 1–2.5)
ALP BLD-CCNC: 74 U/L (ref 35–104)
ALT SERPL-CCNC: 12 U/L (ref 5–33)
AMYLASE: 56 U/L (ref 28–100)
ANION GAP SERPL CALCULATED.3IONS-SCNC: 9 MMOL/L (ref 9–17)
AST SERPL-CCNC: 16 U/L
BASOPHILS # BLD: 1 % (ref 0–2)
BASOPHILS ABSOLUTE: 0.08 K/UL (ref 0–0.2)
BILIRUB SERPL-MCNC: 0.5 MG/DL (ref 0.3–1.2)
BUN BLDV-MCNC: 11 MG/DL (ref 6–20)
BUN/CREAT BLD: 17 (ref 9–20)
CALCIUM SERPL-MCNC: 8.9 MG/DL (ref 8.6–10.4)
CHLORIDE BLD-SCNC: 108 MMOL/L (ref 98–107)
CO2: 24 MMOL/L (ref 20–31)
CREAT SERPL-MCNC: 0.66 MG/DL (ref 0.5–0.9)
DIFFERENTIAL TYPE: ABNORMAL
EOSINOPHILS RELATIVE PERCENT: 7 % (ref 1–4)
GFR AFRICAN AMERICAN: >60 ML/MIN
GFR NON-AFRICAN AMERICAN: >60 ML/MIN
GFR SERPL CREATININE-BSD FRML MDRD: ABNORMAL ML/MIN/{1.73_M2}
GFR SERPL CREATININE-BSD FRML MDRD: ABNORMAL ML/MIN/{1.73_M2}
GLUCOSE BLD-MCNC: 92 MG/DL (ref 70–99)
HCG QUALITATIVE: NEGATIVE
HCT VFR BLD CALC: 35.8 % (ref 36.3–47.1)
HEMOGLOBIN: 12.1 G/DL (ref 11.9–15.1)
IMMATURE GRANULOCYTES: 0 %
LIPASE: 28 U/L (ref 13–60)
LYMPHOCYTES # BLD: 35 % (ref 24–43)
MCH RBC QN AUTO: 30.7 PG (ref 25.2–33.5)
MCHC RBC AUTO-ENTMCNC: 33.8 G/DL (ref 25.2–33.5)
MCV RBC AUTO: 90.9 FL (ref 82.6–102.9)
MONOCYTES # BLD: 7 % (ref 3–12)
NRBC AUTOMATED: 0 PER 100 WBC
PDW BLD-RTO: 13.7 % (ref 11.8–14.4)
PLATELET # BLD: 240 K/UL (ref 138–453)
PLATELET ESTIMATE: ABNORMAL
PMV BLD AUTO: 9.2 FL (ref 8.1–13.5)
POTASSIUM SERPL-SCNC: 3.5 MMOL/L (ref 3.7–5.3)
RBC # BLD: 3.94 M/UL (ref 3.95–5.11)
RBC # BLD: ABNORMAL 10*6/UL
SEG NEUTROPHILS: 50 % (ref 36–65)
SEGMENTED NEUTROPHILS ABSOLUTE COUNT: 4.24 K/UL (ref 1.5–8.1)
SODIUM BLD-SCNC: 141 MMOL/L (ref 135–144)
TOTAL PROTEIN: 7.4 G/DL (ref 6.4–8.3)
TROPONIN INTERP: NORMAL
TROPONIN T: NORMAL NG/ML
TROPONIN, HIGH SENSITIVITY: <6 NG/L (ref 0–14)
WBC # BLD: 8.5 K/UL (ref 3.5–11.3)
WBC # BLD: ABNORMAL 10*3/UL

## 2021-08-10 PROCEDURE — 6360000002 HC RX W HCPCS: Performed by: EMERGENCY MEDICINE

## 2021-08-10 PROCEDURE — 93005 ELECTROCARDIOGRAM TRACING: CPT | Performed by: EMERGENCY MEDICINE

## 2021-08-10 PROCEDURE — 85025 COMPLETE CBC W/AUTO DIFF WBC: CPT

## 2021-08-10 PROCEDURE — 84703 CHORIONIC GONADOTROPIN ASSAY: CPT

## 2021-08-10 PROCEDURE — 36415 COLL VENOUS BLD VENIPUNCTURE: CPT

## 2021-08-10 PROCEDURE — 80053 COMPREHEN METABOLIC PANEL: CPT

## 2021-08-10 PROCEDURE — 71045 X-RAY EXAM CHEST 1 VIEW: CPT

## 2021-08-10 PROCEDURE — 82150 ASSAY OF AMYLASE: CPT

## 2021-08-10 PROCEDURE — 99284 EMERGENCY DEPT VISIT MOD MDM: CPT

## 2021-08-10 PROCEDURE — 83690 ASSAY OF LIPASE: CPT

## 2021-08-10 PROCEDURE — 96374 THER/PROPH/DIAG INJ IV PUSH: CPT

## 2021-08-10 PROCEDURE — 2580000003 HC RX 258: Performed by: EMERGENCY MEDICINE

## 2021-08-10 PROCEDURE — 84484 ASSAY OF TROPONIN QUANT: CPT

## 2021-08-10 RX ORDER — KETOROLAC TROMETHAMINE 30 MG/ML
30 INJECTION, SOLUTION INTRAMUSCULAR; INTRAVENOUS ONCE
Status: COMPLETED | OUTPATIENT
Start: 2021-08-10 | End: 2021-08-10

## 2021-08-10 RX ORDER — 0.9 % SODIUM CHLORIDE 0.9 %
1000 INTRAVENOUS SOLUTION INTRAVENOUS ONCE
Status: COMPLETED | OUTPATIENT
Start: 2021-08-10 | End: 2021-08-10

## 2021-08-10 RX ADMIN — SODIUM CHLORIDE 1000 ML: 9 INJECTION, SOLUTION INTRAVENOUS at 13:32

## 2021-08-10 RX ADMIN — KETOROLAC TROMETHAMINE 30 MG: 30 INJECTION, SOLUTION INTRAMUSCULAR; INTRAVENOUS at 13:23

## 2021-08-10 ASSESSMENT — PAIN DESCRIPTION - PAIN TYPE: TYPE: ACUTE PAIN

## 2021-08-10 ASSESSMENT — PAIN SCALES - GENERAL
PAINLEVEL_OUTOF10: 8
PAINLEVEL_OUTOF10: 6
PAINLEVEL_OUTOF10: 8
PAINLEVEL_OUTOF10: 0

## 2021-08-10 ASSESSMENT — ENCOUNTER SYMPTOMS
COUGH: 0
VOMITING: 0
ABDOMINAL PAIN: 0
EYE PAIN: 0
SHORTNESS OF BREATH: 0
DIARRHEA: 0
BACK PAIN: 0
NAUSEA: 0

## 2021-08-10 ASSESSMENT — PAIN DESCRIPTION - ORIENTATION: ORIENTATION: LEFT

## 2021-08-10 ASSESSMENT — PAIN DESCRIPTION - DESCRIPTORS
DESCRIPTORS: STABBING
DESCRIPTORS: RADIATING

## 2021-08-10 ASSESSMENT — PAIN DESCRIPTION - LOCATION: LOCATION: CHEST

## 2021-08-10 NOTE — ED PROVIDER NOTES
[facility]  eMERGENCY dEPARTMENT eNCOUnter      Pt Name: Stacy Elizabeth  MRN: [id number]  [place] 1985  Date of evaluation: 8/10/2021      CHIEF COMPLAINT       Chief Complaint   Patient presents with    Chest Pain         HISTORY OF PRESENT ILLNESS    Stacy Elizabeth is a 38 y.o. female who presents with chest pain she was seen this weekend for it she says it is constant is left-sided sharp she had a work-up and had follow-up with cardiology today she called cardiology because the pain was bad and they sent her here patient says stress seems to make it worse is been no recent fevers chills cough no leg swelling she does have a history of COPD    Patient was seen yesterday work-up included EKG cardiac labs and a CT of the chest which showed no acute findings did show some enlargement of her thymus gland  REVIEW OF SYSTEMS         Review of Systems   Constitutional: Negative for chills and fever. HENT: Negative for congestion and ear pain. Eyes: Negative for pain and visual disturbance. Respiratory: Negative for cough and shortness of breath. Cardiovascular: Positive for chest pain. Negative for palpitations and leg swelling. Gastrointestinal: Negative for abdominal pain, diarrhea, nausea and vomiting. Endocrine: Negative for polydipsia and polyuria. Genitourinary: Negative for difficulty urinating, dysuria and frequency. Musculoskeletal: Negative for back pain, joint swelling, myalgias, neck pain and neck stiffness. Skin: Negative for rash. Neurological: Negative for dizziness, weakness and headaches. Hematological: Negative for adenopathy. Does not bruise/bleed easily. Psychiatric/Behavioral: Negative for confusion, self-injury and suicidal ideas.          PAST MEDICAL HISTORY    has a past medical history of Anxiety, Bipolar 1 disorder ([facility] [address] COPD (chronic obstructive pulmonary disease) ([facility] [address] Headache, Major depressive disorder, PTSD (post-traumatic stress disorder), and Substance abuse ([facility] [address] SURGICAL HISTORY      has a past surgical history that includes Appendectomy; Tonsillectomy; Mandible fracture surgery; Tubal ligation; back surgery;  section; [place] tooth extraction; Bunionectomy (Left, 2020); and Bunionectomy (Left, 2020). CURRENT MEDICATIONS       Previous Medications    BUSPIRONE (BUSPAR) 10 MG TABLET    Take 1 tablet by mouth 3 times daily as needed (anxiety)    CARIPRAZINE HCL (VRAYLAR) 1.5 MG CAPSULE    Take 1 capsule by mouth daily    FLUTICASONE-UMECLIDIN-VILANT (TRELEGY ELLIPTA) 100-62.5-25 MCG/INH AEPB    Inhale 1 puff into the lungs daily       ALLERGIES     is allergic to latex, clindamycin/lincomycin, dust mite extract, other, pcn [penicillins], and pollen extract. FAMILY HISTORY     She indicated that her mother is . She indicated that her father is . She indicated that both of her sisters are alive. She indicated that her maternal grandmother is . She indicated that her maternal grandfather is . She indicated that her paternal grandmother is alive. She indicated that her paternal grandfather is . family history includes COPD in her mother; Cancer in her maternal grandmother and paternal grandfather; Emphysema in her mother; Heart Disease in her mother; High Blood Pressure in her father and mother; High Cholesterol in her mother; Kidney Disease in her father; Stroke in her mother. SOCIAL HISTORY      reports that she has been smoking cigarettes. She started smoking about 26 years ago. She has a 6.25 pack-year smoking history. She has never used smokeless tobacco. She reports current drug use. Drug: Marijuana. She reports that she does not drink alcohol. PHYSICAL EXAM     INITIAL VITALS:  height is 5' 5\" (1.651 m) and weight is 175 lb (79.4 kg). Her temperature is 97.7 °F (36.5 °C). Her blood pressure is 97/61 and her pulse is 71. Her respiration is 15 and oxygen saturation is 97%. Physical Exam  Constitutional:       Appearance: Normal appearance. She is well-developed. HENT:      Head: Normocephalic and atraumatic. Eyes:      Conjunctiva/sclera: Conjunctivae normal.      Pupils: Pupils are equal, round, and reactive to light. Cardiovascular:      Rate and Rhythm: Normal rate and regular rhythm. Pulmonary:      Effort: Pulmonary effort is normal.      Breath sounds: Normal breath sounds. Abdominal:      General: Bowel sounds are normal.      Palpations: Abdomen is soft. Musculoskeletal:         General: No tenderness. Normal range of motion. Cervical back: Normal range of motion. Skin:     General: Skin is warm and dry. Neurological:      General: No focal deficit present. Mental Status: She is alert and oriented to person, place, and time. Psychiatric:         Behavior: Behavior normal.           DIFFERENTIAL DIAGNOSIS/ MDM:     Chest pain will review work-up done this weekend also will repeat EKG do 1 more troponin do a chest x-ray    DIAGNOSTIC RESULTS     EKG: All EKG's are interpreted by the Emergency Department Physician who either signs or Co-signs this chart in the absence of a cardiologist.  Normal sinus rhythm with sinus arrhythmia rate of 61 bpm, IL interval is 162 ms QRS durations 82 ms QT corrected 432 ms axis is 86 there is no acute ST or T wave changes seen      RADIOLOGY:   I directly visualized the following  images and reviewed the radiologist interpretations:       EXAMINATION:   ONE XRAY VIEW OF THE CHEST       8/10/2021 1:08 pm       COMPARISON:   Chest radiograph performed 06/03/2020.       HISTORY:   ORDERING SYSTEM PROVIDED HISTORY: Chest pain   TECHNOLOGIST PROVIDED HISTORY:   Chest pain   Reason for Exam: Increasing chest pain for 1 week.  History of asthma and   smoking.    Acuity: Acute   Type of Exam: Initial       FINDINGS:   There is no acute consolidation or effusion.  There is no pneumothorax.  The   mediastinal structures are unremarkable.  The upper abdomen is unremarkable. The extrathoracic soft tissues are unremarkable. Noreene Shouts is no acute osseous   abnormality.           Impression   No acute cardiopulmonary process. ED BEDSIDE ULTRASOUND:       LABS:  Labs Reviewed   CBC WITH AUTO DIFFERENTIAL - Abnormal; Notable for the following components:       Result Value    RBC 3.94 (*)     Hematocrit 35.8 (*)     MCHC 33.8 (*)     Eosinophils % 7 (*)     Absolute Eos # 0.57 (*)     All other components within normal limits   COMPREHENSIVE METABOLIC PANEL - Abnormal; Notable for the following components:    Potassium 3.5 (*)     Chloride 108 (*)     All other components within normal limits   LIPASE   AMYLASE   HCG, SERUM, QUALITATIVE   TROPONIN           EMERGENCY DEPARTMENT COURSE:   Vitals:    Vitals:    08/10/21 1400 08/10/21 1415 08/10/21 1430 08/10/21 1445   BP: 98/66 (!) 95/57 101/75 97/61   Pulse: 66 72 76 71   Resp: 19 21 17 15   Temp:       SpO2: 95% 97% 98% 97%   Weight:       Height:         -------------------------  BP: 97/61, Temp: 97.7 °F (36.5 °C), Pulse: 71, Resp: 15        Re-evaluation Notes    Resting comfortably    CRITICAL CARE:   None        CONSULTS:      PROCEDURES:  None    FINAL IMPRESSION      1. Other chest pain          DISPOSITION/PLAN   DISPOSITION discharged    Condition on Disposition    Stable    PATIENT REFERRED TO:  Cha Nam MD  [address] [place] [address]  [phone]    In 3 days      [address] [phone]    In 3 days  Cardiology      DISCHARGE MEDICATIONS:  New Prescriptions    No medications on file       (Please note that portions of this note were completed with a voice recognition program.  Efforts were made to edit the dictations but occasionally words are mis-transcribed.)    Abby Alegria MD,, MD, F.A.A.E.M.   Attending Emergency Physician                          Abby Alegria MD  08/10/21 1500

## 2021-08-12 LAB
EKG ATRIAL RATE: 61 BPM
EKG P AXIS: 81 DEGREES
EKG P-R INTERVAL: 162 MS
EKG Q-T INTERVAL: 430 MS
EKG QRS DURATION: 82 MS
EKG QTC CALCULATION (BAZETT): 432 MS
EKG R AXIS: 86 DEGREES
EKG T AXIS: 73 DEGREES
EKG VENTRICULAR RATE: 61 BPM

## 2021-08-13 ENCOUNTER — TELEPHONE (OUTPATIENT)
Dept: FAMILY MEDICINE CLINIC | Age: 36
End: 2021-08-13

## 2021-08-13 ENCOUNTER — OFFICE VISIT (OUTPATIENT)
Dept: CARDIOLOGY | Age: 36
End: 2021-08-13
Payer: COMMERCIAL

## 2021-08-13 VITALS
BODY MASS INDEX: 30.16 KG/M2 | HEART RATE: 58 BPM | WEIGHT: 181 LBS | HEIGHT: 65 IN | DIASTOLIC BLOOD PRESSURE: 71 MMHG | SYSTOLIC BLOOD PRESSURE: 110 MMHG

## 2021-08-13 DIAGNOSIS — F41.9 ANXIETY: ICD-10-CM

## 2021-08-13 DIAGNOSIS — F43.9 STRESS: ICD-10-CM

## 2021-08-13 DIAGNOSIS — U07.1 COVID-19: ICD-10-CM

## 2021-08-13 DIAGNOSIS — R07.9 CHEST PAIN, UNSPECIFIED TYPE: Primary | ICD-10-CM

## 2021-08-13 PROCEDURE — 99204 OFFICE O/P NEW MOD 45 MIN: CPT | Performed by: INTERNAL MEDICINE

## 2021-08-13 RX ORDER — NAPROXEN 250 MG/1
250 TABLET ORAL 2 TIMES DAILY WITH MEALS
Qty: 28 TABLET | Refills: 0 | Status: SHIPPED | OUTPATIENT
Start: 2021-08-13 | End: 2021-10-12

## 2021-08-13 NOTE — PROGRESS NOTES
Cardiology Consultation/Follow Up. [facility]    Kaylin Ortega  1985  [id number]    Today: 8/13/21    CC: Patient is here for new consultation for CP. HPI:   Kaylin Josejose alejandro is here for new consultation for CP. Was in ER on 8/8 and 8/10 with chest pains. Described as sharp, left sided and constant. Described as stabbing and tightness. Worse with deep breathing. Has bad anxiety, while at work has lots of stress, taking care of nieces (fostering), moving soon, dealing with courts, works 3rd shift. The more stress the more she notices it. Drinks lots of energy drinks due to working third shift. Only sleeping few hours everyday. Denies any associated sob, orthopnea, pnd, le edema, palpitations, arm pain, jaw pain, nausea/vomiting. Had covid 1-2 months ago.      Past Medical:  Past Medical History:   Diagnosis Date    Anxiety     [facility] ([facility] [address]     COPD (chronic obstructive pulmonary disease) ([facility] [address]     Headache     Major depressive disorder     PTSD (post-traumatic stress disorder)     Substance abuse ([facility] [address]     recovering alcoholic and drug [employer]-18 mo sober       Past Surgical:  Past Surgical History:   Procedure Laterality Date    APPENDECTOMY      BACK SURGERY      BUNIONECTOMY Left 06/23/2020    BUNIONECTOMY Left 6/23/2020    Left FOOT BUNIONECTOMY stemmed implant performed by Hugo Gonzales DPM [address]      TONSILLECTOMY      TUBAL LIGATION      WISDOM TOOTH EXTRACTION         Family History:  Family History   Problem Relation Age of Onset    Heart Disease Mother     High Blood Pressure Mother     High Cholesterol Mother     Emphysema Mother     COPD Mother     Stroke Mother     Kidney Disease Father     High Blood Pressure Father     Cancer Maternal Grandmother         breast    Cancer Paternal Grandfather         lung     Social History:  Social History     Socioeconomic History  Marital status: Legally      Spouse name: None    Number of children: None    Years of education: None    Highest education level: None   Occupational History    None   Tobacco Use    Smoking status: Current Every Day Smoker     Packs/day: 0.25     Years: 25.00     Pack years: 6.25     Types: Cigarettes     Start date: 1/1/1995    Smokeless tobacco: Never Used    Tobacco comment: vaping, trying to quit   Vaping Use    Vaping Use: Every day    Substances: Nicotine    Devices: Disposable   Substance and Sexual Activity    Alcohol use: No     Comment: recovering alcholic quit june 28, 8426    Drug use: Yes     Types: Marijuana     Comment: former addict, none since 4/13/20    Sexual activity: Yes     Partners: Male   Other Topics Concern    None   Social History Narrative    None     Social Determinants of Health     Financial Resource Strain:     Difficulty of Paying Living Expenses:    Food Insecurity:     Worried About Running Out of Food in the Last Year:     [address] in the Last Year:    Transportation Needs:     Lack of Transportation (Medical):  Lack of Transportation (Non-Medical):    Physical Activity:     Days of Exercise per Week:     Minutes of Exercise per Session:    Stress:     Feeling of Stress :    Social Connections:     Frequency of Communication with Friends and Family:     Frequency of Social Gatherings with Friends and Family:     Attends Sabianism Services:     Active Member of Clubs or Organizations:     Attends Club or Organization Meetings:     Marital Status:    Intimate Partner Violence:     Fear of Current or Ex-Partner:     Emotionally Abused:     Physically Abused:     Sexually Abused:      REVIEW OF SYSTEMS:    · Constitutional: there has been no unanticipated weight loss. There's been No change in energy level, No change in activity level. · Eyes: No visual changes or diplopia. No scleral icterus.   · ENT: No Headaches, hearing loss or vertigo. No mouth sores or sore throat. · Cardiovascular: AS HPI  · Respiratory: AS HPI  · Gastrointestinal: No abdominal pain, appetite loss, blood in stools. No change in bowel or bladder habits. · Genitourinary: No dysuria, trouble voiding, or hematuria. · Musculoskeletal:  No gait disturbance, No weakness or joint complaints. · Integumentary: No rash or pruritis. · Neurological: No headache, diplopia, change in muscle strength, numbness or tingling. No change in gait, balance, coordination, mood, affect, memory, mentation, behavior. · Psychiatric: No new anxiety or depression. · Endocrine: No temperature intolerance. No excessive thirst, fluid intake, or urination. No tremor. · Hematologic/Lymphatic: No abnormal bruising or bleeding, blood clots or swollen lymph nodes. · Allergic/Immunologic: No nasal congestion or hives. Medications:  Outpatient Medications Marked as Taking for the 8/13/21 encounter (Office Visit) with [address] [place] [address], DO   Medication Sig Dispense Refill    busPIRone (BUSPAR) 10 MG tablet Take 1 tablet by mouth 3 times daily as needed (anxiety) 90 tablet 2    cariprazine hcl (VRAYLAR) 1.5 MG capsule Take 1 capsule by mouth daily 30 capsule 3    fluticasone-umeclidin-vilant (TRELEGY ELLIPTA) 100-62.5-25 MCG/INH AEPB Inhale 1 puff into the lungs daily 30 each 5      Physical Exam:   Vitals: /71   Pulse 58   Ht 5' 5\" (1.651 m)   Wt 181 lb (82.1 kg)   LMP 08/01/2021 (Within Days)   BMI 30.12 kg/m²   General appearance: alert and cooperative with exam  HEENT: Head: Normocephalic, no lesions, without obvious abnormality. Neck: no carotid bruit, no JVD  Lungs: clear to auscultation bilaterally  Heart: regular rate and rhythm, S1, S2 normal, Murmur  Abdomen: soft, non-tender; bowel sounds normal; no masses,  no organomegaly  Extremities: no site injection hematoma, extremities normal, atraumatic, no cyanosis.  no edema  Neurologic: Mental status: Alert, oriented, thought content appropriate    Labs:  Lab Results   Component Value Date    CHOL 128 04/29/2021    TRIG 51 04/29/2021    HDL 46 04/29/2021    LDLCHOLESTEROL 72 04/29/2021    VLDL NOT REPORTED 04/29/2021    CHOLHDLRATIO 2.8 04/29/2021     Lab Results   Component Value Date     08/10/2021    K 3.5 (L) 08/10/2021     (H) 08/10/2021    CO2 24 08/10/2021    BUN 11 08/10/2021    CREATININE 0.66 08/10/2021    GLUCOSE 92 08/10/2021    CALCIUM 8.9 08/10/2021    PROT 7.4 08/10/2021    LABALBU 4.0 08/10/2021    BILITOT 0.50 08/10/2021    ALKPHOS 74 08/10/2021    AST 16 08/10/2021    ALT 12 08/10/2021    LABGLOM >60 08/10/2021    GFRAA >60 08/10/2021    GLOB 3.2 08/08/2021     EKG:   Normal sinus rhythm with sinus arrhythmia  Normal ECG    Past Medical and Surgical History, Problem List, Allergies, Medications, Labs, Imaging, all reviewed extensively in EMR and with the patient. Assessment:  - Atypical chest pain- ? Due to anxiety/stress. Some pleuritic component.   - Recent COVID19  - Sinus arrhythmia  - Bipolar  - Anxiety   - COPD  - PTSD    Plan:  - arrange for treadmill ECG stress test  - arrange for 2d Echo given pleuritic nature and recent covid- r/o effusion   - trial of NSAIDs- naproxen 250 bid x 14 days  - needs to start sleeping better, eating better, avoiding energy drinks, and work with pcp on her stress/anxiety. The patient is to continue heart healthy diet, weight loss and exercise as tolerated. Patient's medications and side effects were discussed. Medication refills were provided if needed. Follow up appointment timing was discussed. All questions and concerns were addressed to patient's satisfaction. The patient is to follow up in 6 months or sooner if necessary. Thank you for allowing me to participate in the care of this patient, please do not hesitate to call if you have any questions. Petey Gibbons DO, [facility], [address], [address], [place] [address] [facility]  [facility]. [facility]  ([phone])

## 2021-08-17 ENCOUNTER — TELEPHONE (OUTPATIENT)
Dept: FAMILY MEDICINE CLINIC | Age: 36
End: 2021-08-17

## 2021-08-17 NOTE — TELEPHONE ENCOUNTER
No she doesn't want to wait until sept. I would recommend she come to UC today, but if she really wants to see me then come on Thurs. She is probably having severe reflux.

## 2021-08-17 NOTE — TELEPHONE ENCOUNTER
Pt calling stating she's been to the ER x2 for chest pain, saw cardio, but was told to see her PCP in 3 days.    Pt states she always feels like there is something stuck in her throat after she eats, chest pain that feels like someone is stabbing her, states they almost called the EMS on her last night while at work,

## 2021-08-19 ENCOUNTER — OFFICE VISIT (OUTPATIENT)
Dept: PRIMARY CARE CLINIC | Age: 36
End: 2021-08-19
Payer: COMMERCIAL

## 2021-08-19 VITALS
TEMPERATURE: 98.2 F | WEIGHT: 180 LBS | HEART RATE: 74 BPM | SYSTOLIC BLOOD PRESSURE: 124 MMHG | OXYGEN SATURATION: 98 % | DIASTOLIC BLOOD PRESSURE: 74 MMHG | BODY MASS INDEX: 29.95 KG/M2

## 2021-08-19 DIAGNOSIS — R10.11 RUQ PAIN: Primary | ICD-10-CM

## 2021-08-19 PROCEDURE — 99213 OFFICE O/P EST LOW 20 MIN: CPT | Performed by: FAMILY MEDICINE

## 2021-08-19 RX ORDER — BUSPIRONE HYDROCHLORIDE 10 MG/1
10 TABLET ORAL 3 TIMES DAILY PRN
Qty: 90 TABLET | Refills: 3 | Status: SHIPPED | OUTPATIENT
Start: 2021-08-19 | End: 2022-09-19 | Stop reason: SDUPTHER

## 2021-08-19 RX ORDER — OMEPRAZOLE 40 MG/1
40 CAPSULE, DELAYED RELEASE ORAL
Qty: 30 CAPSULE | Refills: 1 | Status: SHIPPED | OUTPATIENT
Start: 2021-08-19 | End: 2021-12-02

## 2021-08-19 ASSESSMENT — PATIENT HEALTH QUESTIONNAIRE - PHQ9
2. FEELING DOWN, DEPRESSED OR HOPELESS: 0
SUM OF ALL RESPONSES TO PHQ QUESTIONS 1-9: 0
SUM OF ALL RESPONSES TO PHQ9 QUESTIONS 1 & 2: 0
SUM OF ALL RESPONSES TO PHQ QUESTIONS 1-9: 0
1. LITTLE INTEREST OR PLEASURE IN DOING THINGS: 0
SUM OF ALL RESPONSES TO PHQ QUESTIONS 1-9: 0

## 2021-08-19 ASSESSMENT — ENCOUNTER SYMPTOMS
WHEEZING: 0
COUGH: 0
CONSTIPATION: 0
SHORTNESS OF BREATH: 0
CHEST TIGHTNESS: 0
ABDOMINAL PAIN: 1
NAUSEA: 1
DIARRHEA: 0

## 2021-08-19 NOTE — PATIENT INSTRUCTIONS
Patient Education        Low-Fat Diet for Gallbladder Disease: Care Instructions  Overview     When you eat, the gallbladder releases bile, which helps you digest the fat in food. If you have an inflamed gallbladder, this may cause pain. A low-fat diet may give your gallbladder a rest so you can start to heal. Your doctor and dietitian can help you make an eating plan that does not irritate your digestive system. Always talk with your doctor or dietitian before you make changes in your diet. Follow-up care is a key part of your treatment and safety. Be sure to make and go to all appointments, and call your doctor if you are having problems. It's also a good idea to know your test results and keep a list of the medicines you take. How can you care for yourself at home? · Eat many small meals and snacks each day instead of three large meals. · Choose lean meats. ? Eat no more than 5 to 6½ ounces of meat a day. ? Cut off all fat you can see. ? Eat chicken and turkey without the skin. ? Many types of fish, such as salmon, lake trout, tuna, and herring, provide healthy omega-3 fat. But, avoid fish canned in oil, such as sardines in olive oil. ? Bake, broil, or grill meats, poultry, or fish instead of frying them in butter or fat. · Drink or eat nonfat or low-fat milk, yogurt, cheese, or other milk products each day. ? Read the labels on cheeses, and choose those with less than 5 grams of fat an ounce. ? Try fat-free sour cream, cream cheese, or yogurt. ? Avoid cream soups and cream sauces on pasta. ? Eat low-fat ice cream, frozen yogurt, or sorbet. Avoid regular ice cream.  · Eat whole-grain cereals, breads, crackers, rice, or pasta. Avoid high-fat foods such as croissants, scones, biscuits, waffles, doughnuts, muffins, granola, and high-fat breads. · Flavor your foods with herbs and spices (such as basil, tarragon, or mint), fat-free sauces, or lemon juice instead of butter.  You can also use butter substitutes, fat-free mayonnaise, or fat-free dressing. · Try applesauce, prune puree, or mashed bananas to replace some or all of the fat when you bake. · Limit fats and oils, such as butter, margarine, mayonnaise, and salad dressing, to no more than 1 tablespoon a meal.  · Avoid high-fat foods, such as:  ? Chocolate, whole milk, ice cream, and processed cheese. ? Fried or buttered foods. ? Sausage, salami, and you. ? Cinnamon rolls, cakes, pies, cookies, and other pastries. ? Prepared snack foods, such as potato chips, nut and granola bars, and mixed nuts. ? Coconut and avocado. · Learn how to read food labels for serving sizes and ingredients. Fast-food and convenience-food meals often have lots of fat. Where can you learn more? Go to https://RackWareperobertoeb.Bigvest. org and sign in to your [employer] account. Enter M981 in the [employer] box to learn more about \"Low-Fat Diet for Gallbladder Disease: Care Instructions. \"     If you do not have an account, please click on the \"Sign Up Now\" link. Current as of: December 17, 2020               Content Version: 12.9  © 9343-0298 Healthwise, Incorporated. Care instructions adapted under license by [facility] ([facility]). If you have questions about a medical condition or this instruction, always ask your healthcare professional. [place] [address] any warranty or liability for your use of this information.

## 2021-08-20 ENCOUNTER — HOSPITAL ENCOUNTER (OUTPATIENT)
Dept: ULTRASOUND IMAGING | Age: 36
Discharge: HOME | End: 2021-08-22
Payer: COMMERCIAL

## 2021-08-20 DIAGNOSIS — R10.11 RUQ PAIN: ICD-10-CM

## 2021-08-20 DIAGNOSIS — R10.11 RUQ PAIN: Primary | ICD-10-CM

## 2021-08-20 PROCEDURE — 76705 ECHO EXAM OF ABDOMEN: CPT

## 2021-08-23 ENCOUNTER — TELEPHONE (OUTPATIENT)
Dept: FAMILY MEDICINE CLINIC | Age: 36
End: 2021-08-23

## 2021-08-23 NOTE — TELEPHONE ENCOUNTER
Left message informing pt Dilshad Jackson is scheduled at [facility] on 8/31/2021 at 0900. Pt to be NPO after midnight 8/30/21. Faxed demographics, insurance, imaging and order to [facility] [phone]. Pt to call radiology at [phone] if conflict of appt.

## 2021-09-24 ENCOUNTER — OFFICE VISIT (OUTPATIENT)
Dept: FAMILY MEDICINE CLINIC | Age: 36
End: 2021-09-24
Payer: COMMERCIAL

## 2021-09-24 VITALS
TEMPERATURE: 97.5 F | HEIGHT: 65 IN | BODY MASS INDEX: 30.86 KG/M2 | SYSTOLIC BLOOD PRESSURE: 102 MMHG | DIASTOLIC BLOOD PRESSURE: 66 MMHG | WEIGHT: 185.2 LBS | RESPIRATION RATE: 12 BRPM | OXYGEN SATURATION: 99 % | HEART RATE: 71 BPM

## 2021-09-24 DIAGNOSIS — F31.9 BIPOLAR 1 DISORDER (HCC): Primary | ICD-10-CM

## 2021-09-24 DIAGNOSIS — L84 CALLUS OF FOOT: ICD-10-CM

## 2021-09-24 DIAGNOSIS — R10.11 RUQ PAIN: ICD-10-CM

## 2021-09-24 PROCEDURE — 99214 OFFICE O/P EST MOD 30 MIN: CPT | Performed by: FAMILY MEDICINE

## 2021-09-24 RX ORDER — PROPRANOLOL HYDROCHLORIDE 20 MG/1
20 TABLET ORAL 3 TIMES DAILY
Qty: 90 TABLET | Refills: 1 | Status: SHIPPED | OUTPATIENT
Start: 2021-09-24 | End: 2022-09-19 | Stop reason: SDUPTHER

## 2021-09-24 SDOH — ECONOMIC STABILITY: FOOD INSECURITY: WITHIN THE PAST 12 MONTHS, THE FOOD YOU BOUGHT JUST DIDN'T LAST AND YOU DIDN'T HAVE MONEY TO GET MORE.: PATIENT DECLINED

## 2021-09-24 SDOH — ECONOMIC STABILITY: FOOD INSECURITY: WITHIN THE PAST 12 MONTHS, YOU WORRIED THAT YOUR FOOD WOULD RUN OUT BEFORE YOU GOT MONEY TO BUY MORE.: PATIENT DECLINED

## 2021-09-24 ASSESSMENT — ENCOUNTER SYMPTOMS
WHEEZING: 0
ABDOMINAL PAIN: 1
COUGH: 0
CHEST TIGHTNESS: 0
NAUSEA: 1
ROS SKIN COMMENTS: CALLUSES ON FEET
SHORTNESS OF BREATH: 0

## 2021-09-24 ASSESSMENT — SOCIAL DETERMINANTS OF HEALTH (SDOH): HOW HARD IS IT FOR YOU TO PAY FOR THE VERY BASICS LIKE FOOD, HOUSING, MEDICAL CARE, AND HEATING?: PATIENT DECLINED

## 2021-09-24 NOTE — PROGRESS NOTES
[address]  [address] [address]  Dept: [phone]  Dept Fax: [phone]  Loc: [phone]    Cristopher Marcum is a 43 y.o. female who presents today for her medical conditions/complaints as noted below. Cristopher Marcum is c/o of   Chief Complaint   Patient presents with    Other     Patient is here for a follow up. Dose want to go over test results. Still having Pain from her GB and other issues to talk about. HPI:     HPI Here today for a follow up of her anxiety and abdominal pain. She also is concerned about foot callsues. She is still having a lot of pain in her gallbladder. She gets increased pain from a lot of foods and even if she has an empty stomach she tends to have pain. Her mom had her gallbladder removed as did her dad, but her dad  shortly after his cholecystectomy. She feels like there is something stuck in her throat all the time and she is having trouble eating. She isn't sure that the omeprazole is working. She is currently fostering her 2 nieces and that is a lot of added stress on her system. She is struggling because she doesn't have any financial help so she is worried about that. She is only sleeping about 3 hours a day because can't rest her mind and she can't relax. This has worsened her work performance because she is crying and breaking down at work. She is also yelling at the kids more than she normally would. She has tried to donate plasma 3 times this week and her hct is below what they want for her so she can't donate plasma. She has large calluses on the bottom of her feet, worse on the left foot which makes her walk funny, stand funny and she can't get rid of it. She has tried shaving it down, using wart remover and nothing is helping.       Past Medical History:   Diagnosis Date    Anxiety     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          Social History     Tobacco Use    Smoking status: Current Every Day Smoker     Packs/day: 0.25     Years: 25.00     Pack years: 6.25     Types: Cigarettes     Start date: 1/1/1995    Smokeless tobacco: Never Used    Tobacco comment: vaping, trying to quit   Substance Use Topics    Alcohol use: No     Comment: recovering alcholic quit june 28, 4685     Current Outpatient Medications   Medication Sig Dispense Refill    cariprazine hcl (VRAYLAR) 3 MG CAPS capsule Take 1 capsule by mouth daily 30 capsule 2    propranolol (INDERAL) 20 MG tablet Take 1 tablet by mouth 3 times daily 90 tablet 1    busPIRone (BUSPAR) 10 MG tablet Take 1 tablet by mouth 3 times daily as needed (anxiety) 90 tablet 3    omeprazole (PRILOSEC) 40 MG delayed release capsule Take 1 capsule by mouth every morning (before breakfast) 30 capsule 1    fluticasone-umeclidin-vilant (TRELEGY ELLIPTA) 100-62.5-25 MCG/INH AEPB Inhale 1 puff into the lungs daily 30 each 5    naproxen (NAPROSYN) 250 MG tablet Take 1 tablet by mouth 2 times daily (with meals) for 14 days (Patient not taking: Reported on 8/19/2021) 28 tablet 0     No current facility-administered medications for this visit. Allergies   Allergen Reactions    Latex      Powder in rubber gloves      Clindamycin/Lincomycin     Dust Mite Extract     Other      Pet dander    Pcn [Penicillins]      Including Amoxicillin    Pollen Extract        Subjective:     Review of Systems   Constitutional: Negative for activity change, appetite change, chills, fatigue and fever. Eyes: Negative for visual disturbance. Respiratory: Negative for cough, chest tightness, shortness of breath and wheezing. Cardiovascular: Negative for chest pain, palpitations and leg swelling. Gastrointestinal: Positive for abdominal pain and nausea. Genitourinary: Negative for difficulty urinating. Skin:        Calluses on feet   Neurological: Negative for dizziness, syncope, weakness and light-headedness. Psychiatric/Behavioral: Positive for decreased concentration, dysphoric mood and sleep disturbance. The patient is nervous/anxious. Objective:      Physical Exam  Vitals and nursing note reviewed. Constitutional:       General: She is not in acute distress. Appearance: She is well-developed. Eyes:      Conjunctiva/sclera: Conjunctivae normal.   Neck:      Thyroid: No thyromegaly. Cardiovascular:      Rate and Rhythm: Normal rate and regular rhythm. Heart sounds: Normal heart sounds. No murmur heard. Pulmonary:      Effort: Pulmonary effort is normal. No respiratory distress. Breath sounds: Normal breath sounds. No wheezing. Musculoskeletal:      Cervical back: Normal range of motion and neck supple. Lymphadenopathy:      Cervical: No cervical adenopathy. Skin:     General: Skin is warm and dry. Findings: No erythema or rash. Neurological:      Mental Status: She is alert and oriented to person, place, and time. Psychiatric:         Attention and Perception: Attention normal.      Comments: She seems overwhelmed. She frequently is rubbing her face and she is taking many deep breaths throughout the conversation. /66 (Site: Left Upper Arm, Position: Sitting, Cuff Size: Medium Adult)   Pulse 71   Temp 97.5 °F (36.4 °C) (Temporal)   Resp 12   Ht 5' 5\" (1.651 m)   Wt 185 lb 3.2 oz (84 kg)   LMP 09/23/2021 (Exact Date)   SpO2 99%   BMI 30.82 kg/m²     Assessment:       Diagnosis Orders   1. Bipolar 1 disorder ([facility] [address]     2. RUQ pain  [facility], [place]   3.  Callus of foot  [place], [place], Podiatry, [place]             Plan:        Bipolar disorder: worsening; she is really struggling with being

## 2021-10-01 ENCOUNTER — TELEPHONE (OUTPATIENT)
Dept: PODIATRY | Age: 36
End: 2021-10-01

## 2021-10-05 ENCOUNTER — TELEPHONE (OUTPATIENT)
Dept: SURGERY | Age: 36
End: 2021-10-05

## 2021-10-05 NOTE — TELEPHONE ENCOUNTER
Patient no showed appointment with Dr. Selma Mcelroy this morning. Writer called patient and she states she had a flat tire. Writer rescheduled appointment to 10/12/2021 at 2:20 pm. Patient verbalized understanding.

## 2021-10-12 ENCOUNTER — HOSPITAL ENCOUNTER (OUTPATIENT)
Dept: LAB | Age: 36
Discharge: HOME | End: 2021-10-12
Payer: COMMERCIAL

## 2021-10-12 ENCOUNTER — HOSPITAL ENCOUNTER (OUTPATIENT)
Dept: NON INVASIVE DIAGNOSTICS | Age: 36
Discharge: HOME | End: 2021-10-12
Payer: COMMERCIAL

## 2021-10-12 ENCOUNTER — HOSPITAL ENCOUNTER (OUTPATIENT)
Dept: GENERAL RADIOLOGY | Age: 36
Discharge: HOME | End: 2021-10-14
Payer: COMMERCIAL

## 2021-10-12 ENCOUNTER — INITIAL CONSULT (OUTPATIENT)
Dept: SURGERY | Age: 36
End: 2021-10-12
Payer: COMMERCIAL

## 2021-10-12 VITALS
DIASTOLIC BLOOD PRESSURE: 78 MMHG | HEART RATE: 70 BPM | HEIGHT: 65 IN | TEMPERATURE: 97.4 F | WEIGHT: 190.2 LBS | BODY MASS INDEX: 31.69 KG/M2 | SYSTOLIC BLOOD PRESSURE: 112 MMHG

## 2021-10-12 DIAGNOSIS — K82.8 GALLBLADDER SLUDGE: Primary | ICD-10-CM

## 2021-10-12 DIAGNOSIS — Z01.818 PRE-OP TESTING: ICD-10-CM

## 2021-10-12 DIAGNOSIS — K82.8 DYSFUNCTIONAL GALLBLADDER: ICD-10-CM

## 2021-10-12 LAB
ABSOLUTE EOS #: 0.48 K/UL (ref 0–0.44)
ABSOLUTE IMMATURE GRANULOCYTE: <0.03 K/UL (ref 0–0.3)
ABSOLUTE LYMPH #: 2.47 K/UL (ref 1.1–3.7)
ABSOLUTE MONO #: 0.66 K/UL (ref 0.1–1.2)
ANION GAP SERPL CALCULATED.3IONS-SCNC: 8 MMOL/L (ref 9–17)
BASOPHILS # BLD: 1 % (ref 0–2)
BASOPHILS ABSOLUTE: 0.06 K/UL (ref 0–0.2)
BUN BLDV-MCNC: 14 MG/DL (ref 6–20)
BUN/CREAT BLD: 21 (ref 9–20)
CALCIUM SERPL-MCNC: 9 MG/DL (ref 8.6–10.4)
CHLORIDE BLD-SCNC: 105 MMOL/L (ref 98–107)
CO2: 27 MMOL/L (ref 20–31)
CREAT SERPL-MCNC: 0.66 MG/DL (ref 0.5–0.9)
DIFFERENTIAL TYPE: ABNORMAL
EKG ATRIAL RATE: 52 BPM
EKG P AXIS: 35 DEGREES
EKG P-R INTERVAL: 140 MS
EKG Q-T INTERVAL: 428 MS
EKG QRS DURATION: 90 MS
EKG QTC CALCULATION (BAZETT): 398 MS
EKG R AXIS: 24 DEGREES
EKG T AXIS: 13 DEGREES
EKG VENTRICULAR RATE: 52 BPM
EOSINOPHILS RELATIVE PERCENT: 6 % (ref 1–4)
GFR AFRICAN AMERICAN: >60 ML/MIN
GFR NON-AFRICAN AMERICAN: >60 ML/MIN
GFR SERPL CREATININE-BSD FRML MDRD: ABNORMAL ML/MIN/{1.73_M2}
GFR SERPL CREATININE-BSD FRML MDRD: ABNORMAL ML/MIN/{1.73_M2}
GLUCOSE BLD-MCNC: 96 MG/DL (ref 70–99)
HCT VFR BLD CALC: 38.8 % (ref 36.3–47.1)
HEMOGLOBIN: 12.8 G/DL (ref 11.9–15.1)
IMMATURE GRANULOCYTES: 0 %
LYMPHOCYTES # BLD: 29 % (ref 24–43)
MCH RBC QN AUTO: 30.9 PG (ref 25.2–33.5)
MCHC RBC AUTO-ENTMCNC: 33 G/DL (ref 25.2–33.5)
MCV RBC AUTO: 93.7 FL (ref 82.6–102.9)
MONOCYTES # BLD: 8 % (ref 3–12)
NRBC AUTOMATED: 0 PER 100 WBC
PDW BLD-RTO: 14.1 % (ref 11.8–14.4)
PLATELET # BLD: 282 K/UL (ref 138–453)
PLATELET ESTIMATE: ABNORMAL
PMV BLD AUTO: 9.7 FL (ref 8.1–13.5)
POTASSIUM SERPL-SCNC: 4.1 MMOL/L (ref 3.7–5.3)
RBC # BLD: 4.14 M/UL (ref 3.95–5.11)
RBC # BLD: ABNORMAL 10*6/UL
SEG NEUTROPHILS: 56 % (ref 36–65)
SEGMENTED NEUTROPHILS ABSOLUTE COUNT: 4.82 K/UL (ref 1.5–8.1)
SODIUM BLD-SCNC: 140 MMOL/L (ref 135–144)
WBC # BLD: 8.5 K/UL (ref 3.5–11.3)
WBC # BLD: ABNORMAL 10*3/UL

## 2021-10-12 PROCEDURE — 80048 BASIC METABOLIC PNL TOTAL CA: CPT

## 2021-10-12 PROCEDURE — 99204 OFFICE O/P NEW MOD 45 MIN: CPT | Performed by: SURGERY

## 2021-10-12 PROCEDURE — 71046 X-RAY EXAM CHEST 2 VIEWS: CPT

## 2021-10-12 PROCEDURE — 85025 COMPLETE CBC W/AUTO DIFF WBC: CPT

## 2021-10-12 PROCEDURE — 36415 COLL VENOUS BLD VENIPUNCTURE: CPT

## 2021-10-12 PROCEDURE — 93005 ELECTROCARDIOGRAM TRACING: CPT

## 2021-10-12 NOTE — PROGRESS NOTES
Subjective   Jodi Kirby is a 43 y.o. female who presents today for evaluation of right upper quadrant pain. Patient reports for past several weeks to of couple months she has been experiencing episodic right upper quadrant pain. She states that sometimes this will last for the majority of the day before improving and seems that she has mild pain at baseline. She was seen by her PCP for this and was sent for ultrasound as well as labs. Lab work was unremarkable. Her ultrasound did show some gallbladder sludge. She was then subsequently sent for a HIDA scan which was read as normal but on review seems that her ejection fraction is 99%. She does report that her pain seems to worsen with food. On further discussion it seems that her diet is heavy and greasy foods and she states she eats a lot of [place] and [place] food. Denies any emesis but does report nausea especially after eating. Does report bloating. Typically patient has constipation but she states that since onset of her other symptoms her bowels have been looser than normal.  Denies any color change.     Past Medical History:   Diagnosis Date    Anxiety     B[facility] 1 disorder ([facility]     COPD (chronic obstructive pulmonary disease) (H[facility])     Headache     Major depressive disorder     PTSD (post-traumatic stress disorder)     Substance abuse ([facility]     recovering alcoholic and drug [facility]-09 mo sober       Past Surgical History:   Procedure Laterality Date    APPENDECTOMY      BACK SURGERY      BUNIONECTOMY Left 06/23/2020    BUNIONECTOMY Left 6/23/2020    Left FOOT BUNIONECTOMY stemmed implant performed by Geovanna Ramirez DPM at [address]      TONSILLECTOMY      TUBAL LIGATION      WISDOM TOOTH EXTRACTION         Current Outpatient Medications   Medication Sig Dispense Refill    cariprazine hcl (VRAYLAR) 3 MG CAPS capsule Take 1 capsule by mouth daily 30 capsule 2    propranolol (INDERAL) 20 MG tablet Take 1 tablet by mouth 3 times daily 90 tablet 1    busPIRone (BUSPAR) 10 MG tablet Take 1 tablet by mouth 3 times daily as needed (anxiety) 90 tablet 3    omeprazole (PRILOSEC) 40 MG delayed release capsule Take 1 capsule by mouth every morning (before breakfast) 30 capsule 1    fluticasone-umeclidin-vilant (TRELEGY ELLIPTA) 100-62.5-25 MCG/INH AEPB Inhale 1 puff into the lungs daily 30 each 5     No current facility-administered medications for this visit.        Allergies   Allergen Reactions    Latex      Powder in rubber gloves      Clindamycin/Lincomycin     Dust Mite Extract     Other      Pet dander    Pcn [Penicillins]      Including Amoxicillin    Pollen Extract        Family History   Problem Relation Age of Onset    Heart Disease Mother     High Blood Pressure Mother     High Cholesterol Mother     Emphysema Mother     COPD Mother     Stroke Mother     Kidney Disease Father     High Blood Pressure Father     Cancer Maternal Grandmother         breast    Cancer Paternal Grandfather         lung       Social History     Socioeconomic History    Marital status: Legally      Spouse name: Not on file    Number of children: Not on file    Years of education: Not on file    Highest education level: Not on file   Occupational History    Not on file   Tobacco Use    Smoking status: Former Smoker     Packs/day: 0.25     Years: 25.00     Pack years: 6.25     Types: Cigarettes     Start date: 1995     Quit date: 10/1/2019     Years since quittin.0    Smokeless tobacco: Never Used   Vaping Use    Vaping Use: Every day    Substances: Nicotine    Devices: Disposable   Substance and Sexual Activity    Alcohol use: No     Comment: recovering alcholic quit 556    Drug use: Yes     Types: Marijuana     Comment: former addict, none since 18    Sexual activity: Yes     Partners: Male   Other Topics Concern    Not on file   Social History Narrative    Not on file     Social Determinants of Health     Financial Resource Strain: Unknown    Difficulty of Paying Living Expenses: Patient refused   Food Insecurity: Unknown    Worried About Running Out of Food in the Last Year: Patient refused    [address] in the Last Year: Patient refused   Transportation Needs:     Lack of Transportation (Medical):  Lack of Transportation (Non-Medical):    Physical Activity:     Days of Exercise per Week:     Minutes of Exercise per Session:    Stress:     Feeling of Stress :    Social Connections:     Frequency of Communication with Friends and Family:     Frequency of Social Gatherings with Friends and Family:     Attends [employer] Services:     Active Member of Clubs or Organizations:     Attends Club or Organization Meetings:     Marital Status:    Intimate Partner Violence:     Fear of Current or Ex-Partner:     Emotionally Abused:     Physically Abused:     Sexually Abused:        ROS:   Review of Systems - General ROS: negative  Psychological ROS: negative  Ophthalmic ROS: negative  ENT ROS: negative  Respiratory ROS: no cough, shortness of breath, or wheezing  Cardiovascular ROS: no chest pain or dyspnea on exertion  Gastrointestinal ROS: per HPI  Genito-Urinary ROS: no dysuria, trouble voiding, or hematuria  Musculoskeletal ROS: negative  Dermatological ROS: negative      Objective   Vitals:    10/12/21 1113   BP: 112/78   Pulse: 70   Temp: 97.4 °F (36.3 °C)     General:in no apparent distress and well developed and well nourished  Eyes: No gross abnormalities. Ears, Nose, Throat: hearing grossly normal bilaterally  Neck: neck supple and non tender without mass  Lungs: clear to auscultation without wheezes or rales   Heart: S1S2, no mumurs, RRR  Abdomen: Soft, nondistended, tender to palpation in epigastrium and right upper quadrant without guarding or rebound, bowel sounds present.   Extremity: negative  Neuro: CN II-XII grossly intact      Assessment     3  49-year-old female with right upper quadrant pain. Imaging is showing microlithiasis with sludge noted in gallbladder and she also has a hyper kinetic gallbladder based on her HIDA scan. Plan     1. I had a discussion with the patient today about the fact that I do suspect her pain is likely related to gallbladder just based on her imaging and we discussed both microlithiasis as well as hyperkinetic gallbladder. After discussion the patient would like to proceed with surgery. Have discussed laparoscopic cholecystectomy. Risks of the procedure including bleeding, infection, scarring, pain, damage to surrounding structures including bile ducts, need for additional surgery, bile leak, retained stones, conversion to open and anesthesia risk were discussed and consent is obtained. 2.  Preop work-up to include labs, KG and chest x-ray. 3.  Have recommended the patient adhere to a bland low-fat diet to try and minimize symptoms.     Electronically signed by Kwabena Khan DO on 10/12/2021 at 2:20 PM      (Please note that portions of this note were completed with a voice recognition program.  Efforts were made to edit the dictations but occasionally words are mis-transcribed.)

## 2021-10-13 ENCOUNTER — HOSPITAL ENCOUNTER (OUTPATIENT)
Dept: PREADMISSION TESTING | Age: 36
Setting detail: SPECIMEN
Discharge: HOME | End: 2021-10-17
Payer: COMMERCIAL

## 2021-10-13 DIAGNOSIS — Z11.59 SPECIAL SCREENING EXAMINATION FOR VIRAL DISEASE: Primary | ICD-10-CM

## 2021-10-13 PROCEDURE — U0003 INFECTIOUS AGENT DETECTION BY NUCLEIC ACID (DNA OR RNA); SEVERE ACUTE RESPIRATORY SYNDROME CORONAVIRUS 2 (SARS-COV-2) (CORONAVIRUS DISEASE [COVID-19]), AMPLIFIED PROBE TECHNIQUE, MAKING USE OF HIGH THROUGHPUT TECHNOLOGIES AS DESCRIBED BY CMS-2020-01-R: HCPCS

## 2021-10-13 PROCEDURE — U0005 INFEC AGEN DETEC AMPLI PROBE: HCPCS

## 2021-10-14 ENCOUNTER — TELEPHONE (OUTPATIENT)
Dept: SURGERY | Age: 36
End: 2021-10-14

## 2021-10-14 LAB
SARS-COV-2: NORMAL
SARS-COV-2: NOT DETECTED
SOURCE: NORMAL

## 2021-10-14 NOTE — TELEPHONE ENCOUNTER
Spoke with patient and given results. Also given number for insurance department to talk to them about FMLA paperwork.

## 2021-10-14 NOTE — TELEPHONE ENCOUNTER
Patient called and would like to know/ discuss her EKG results. Patient would also like to know if you have received her [facility] paperwork since she is having her surgery on Monday 10/18 with Dr Best Tijerina.

## 2021-10-15 ENCOUNTER — TELEPHONE (OUTPATIENT)
Dept: SURGERY | Age: 36
End: 2021-10-15

## 2021-10-15 NOTE — TELEPHONE ENCOUNTER
Please call Pauly Mtz in the insurance office back at extension . She called the office to verify surgery date.

## 2021-10-18 LAB — HCG URINE: NEGATIVE

## 2021-10-22 ENCOUNTER — TELEPHONE (OUTPATIENT)
Dept: SURGERY | Age: 36
End: 2021-10-22

## 2021-10-22 NOTE — TELEPHONE ENCOUNTER
Patient had a Laparoscopic cholecystectomy done at [facility] on 10/18/2021. She states she is in severe pain still and does not have enough pain medication to last her past today. She is taking medication every 4 hours. She has not noticed any drainage or problems with the incision sites, just says she is in pain and unable to bend or even blow her nose. She had severe constipation and has been taking her colace and also used dulcolax and milk of mag before she could have a bowel movement. She was encouraged to keep using stool softeners and wants to know if she is able to get more pain medication to last until her post op appointment on 10/26/2021?

## 2021-10-26 ENCOUNTER — OFFICE VISIT (OUTPATIENT)
Dept: SURGERY | Age: 36
End: 2021-10-26
Payer: COMMERCIAL

## 2021-10-26 VITALS
WEIGHT: 192.4 LBS | TEMPERATURE: 97.8 F | HEIGHT: 65 IN | SYSTOLIC BLOOD PRESSURE: 92 MMHG | HEART RATE: 66 BPM | BODY MASS INDEX: 32.06 KG/M2 | DIASTOLIC BLOOD PRESSURE: 58 MMHG

## 2021-10-26 DIAGNOSIS — Z09 POSTOP CHECK: Primary | ICD-10-CM

## 2021-10-26 PROBLEM — K82.8 BILIARY DYSKINESIA: Status: ACTIVE | Noted: 2021-10-26

## 2021-10-26 PROBLEM — K82.8 BILIARY DYSKINESIA: Status: RESOLVED | Noted: 2021-10-26 | Resolved: 2021-10-26

## 2021-10-26 PROCEDURE — 99024 POSTOP FOLLOW-UP VISIT: CPT | Performed by: SURGERY

## 2021-10-26 RX ORDER — ONDANSETRON 4 MG/1
TABLET, FILM COATED ORAL
COMMUNITY
Start: 2021-10-18 | End: 2021-12-14

## 2021-10-26 RX ORDER — HYDROCODONE BITARTRATE AND ACETAMINOPHEN 5; 325 MG/1; MG/1
TABLET ORAL
COMMUNITY
Start: 2021-10-18 | End: 2022-01-06

## 2021-10-26 RX ORDER — CYCLOBENZAPRINE HCL 5 MG
5 TABLET ORAL 3 TIMES DAILY PRN
Qty: 30 TABLET | Refills: 0 | Status: SHIPPED | OUTPATIENT
Start: 2021-10-26 | End: 2021-11-05

## 2021-10-26 RX ORDER — DOCUSATE SODIUM 100 MG/1
CAPSULE, LIQUID FILLED ORAL
COMMUNITY
Start: 2021-10-18 | End: 2021-12-02

## 2021-10-26 NOTE — ASSESSMENT & PLAN NOTE
Patient doing well after surgery. Still having some pain at the epigastric incision which does seem to be consistent with normal postop course. This may be from muscle suture and so we will start her on a short course of muscle relaxer to see if we get any improvement. Otherwise may use over-the-counter ibuprofen and Tylenol for pain control. Discussed continued activity restrictions for full 6 weeks. We will plan to see back in office in 2 to 3 weeks for recheck.

## 2021-10-26 NOTE — PROGRESS NOTES
Subjective   Sharri Robles is a 43 y.o. female who presents today for follow-up after recent laparoscopic cholecystectomy. Patient underwent surgery on 10/18. After surgery she states that she has been doing well overall but does complain of pain mostly at the epigastric incision. This is present at baseline but does seem to be made worse with certain activities. Denies any bulges. She is tolerating diet without any nausea or emesis. She states that the first few days after surgery did not have a bowel movement and she ended up going out on her own getting bottles of mag citrate as well as Dulcolax and taking these. Started having bowel movements at postop day 3 and has not had any problems since then. Denies any incisional problems and has not reporting any drainage or signs of infection. Does not feel that she is ready to go back to work yet.     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 Abdi Melgar EXTRACTION         Current Outpatient Medications   Medication Sig Dispense Refill    ondansetron (ZOFRAN) 4 MG tablet       docusate sodium (COLACE) 100 MG capsule       cariprazine hcl (VRAYLAR) 3 MG CAPS capsule Take 1 capsule by mouth daily 30 capsule 2    propranolol (INDERAL) 20 MG tablet Take 1 tablet by mouth 3 times daily 90 tablet 1    busPIRone (BUSPAR) 10 MG tablet Take 1 tablet by mouth 3 times daily as needed (anxiety) 90 tablet 3    omeprazole (PRILOSEC) 40 MG delayed release capsule Take 1 capsule by mouth every morning (before breakfast) 30 capsule 1    fluticasone-umeclidin-vilant (TRELEGY ELLIPTA) 100-62.5-25 MCG/INH AEPB Inhale 1 puff into the lungs daily 30 each 5    HYDROcodone-acetaminophen (NORCO) 5-325 MG per tablet  (Patient not taking: Reported on 10/26/2021)       No current facility-administered medications for this visit.        Allergies   Allergen Reactions    Latex      Powder in rubber gloves      Clindamycin/Lincomycin     Dust Mite Extract     Other      Pet dander    Pcn [Penicillins]      Including Amoxicillin    Pollen Extract        Family History   Problem Relation Age of Onset    Heart Disease Mother     High Blood Pressure Mother     High Cholesterol Mother     Emphysema Mother     COPD Mother     Stroke Mother     Kidney Disease Father     High Blood Pressure Father     Cancer Maternal Grandmother         breast    Cancer Paternal Grandfather         lung       Social History     Socioeconomic History    Marital status: Legally      Spouse name: Not on file    Number of children: Not on file    Years of education: Not on file    Highest education level: Not on file   Occupational History    Not on file   Tobacco Use    Smoking status: Former Smoker     Packs/day: 0.25     Years: 25.00     Pack years: 6.25     Types: Cigarettes     Start date: 1995     Quit date: 10/1/2019     Years since quittin.0    Smokeless tobacco: Never Used   Vaping Use    Vaping Use: Every day    Substances: Nicotine    Devices: Disposable   Substance and Sexual Activity    Alcohol use: No     Comment: recovering alcholic quit 6438    Drug use: Yes     Types: Marijuana     Comment: medical marijuana    Sexual activity: Yes     Partners: Male   Other Topics Concern    Not on file   Social History Narrative    Not on file     Social Determinants of Health     Financial Resource Strain: Unknown    Difficulty of Paying Living Expenses: Patient refused   Food Insecurity: Unknown    Worried About Running Out of Food in the Last Year: Patient refused    [address] in the Last Year: Patient refused   Transportation Needs:     Lack of Transportation (Medical):  Lack of Transportation (Non-Medical):    Physical Activity:     Days of Exercise per Week:     Minutes of Exercise per Session:    Stress:     Feeling of Stress :    Social Connections:     Frequency of Communication with Friends and Family:     Frequency of Social Gatherings with Friends and Family:     Attends Gnosticism Services:     Active Member of Clubs or Organizations:     Attends Club or Organization Meetings:     Marital Status:    Intimate Partner Violence:     Fear of Current or Ex-Partner:     Emotionally Abused:     Physically Abused:     Sexually Abused:        ROS:   Review of Systems - Negative except as noted in HPI      Objective   Vitals:    10/26/21 1030   BP: (!) 92/58   Pulse: 66   Temp: 97.8 °F (36.6 °C)     General:in no apparent distress and well developed and well nourished  Eyes: No gross abnormalities. Ears, Nose, Throat: hearing grossly normal bilaterally  Neck: neck supple and non tender without mass  Lungs: clear to auscultation without wheezes or rales   Heart: S1S2, no mumurs, RRR  Abdomen: Soft, nondistended, tender to palpation at incision sites but otherwise nontender, bowel sounds present. Incisions all intact with glue in place. Small amount of ecchymosis below umbilical incision but seems to be in the process of resolving. Extremity: negative  Neuro: CN II-XII grossly intact      1. Postop check  Assessment & Plan:   Patient doing well after surgery. Still having some pain at the epigastric incision which does seem to be consistent with normal postop course.   This may be from muscle suture and so we will start her on a short course of muscle relaxer to see if we get any improvement. Otherwise may use over-the-counter ibuprofen and Tylenol for pain control. Discussed continued activity restrictions for full 6 weeks. We will plan to see back in office in 2 to 3 weeks for recheck.          (Please note that portions of this note were completed with a voice recognition program.  Efforts were made to edit the dictations but occasionally words are mis-transcribed.)

## 2021-10-26 NOTE — LETTER
[address] [place] [address] Gen Surg A department of [place] [address]  Phone: [phone]  Fax: [address], DO        October 26, 2021     Patient: John Hurtado   YOB: 1985   Date of Visit: 10/26/2021       To Whom It May Concern: It is my medical opinion that John Hurtado should remain out of work until 11/8/21. If you have any questions or concerns, please don't hesitate to call.     Sincerely,        Vicky Gamboa, DO

## 2021-11-15 ENCOUNTER — TELEPHONE (OUTPATIENT)
Dept: FAMILY MEDICINE CLINIC | Age: 36
End: 2021-11-15

## 2021-11-15 NOTE — TELEPHONE ENCOUNTER
Attempted to reach patient regarding missed appointment on 11/15/21. Unable to contact at this time. Left message to reschedule.

## 2021-11-16 ENCOUNTER — OFFICE VISIT (OUTPATIENT)
Dept: SURGERY | Age: 36
End: 2021-11-16

## 2021-11-16 VITALS
HEIGHT: 65 IN | SYSTOLIC BLOOD PRESSURE: 102 MMHG | HEART RATE: 72 BPM | TEMPERATURE: 98.7 F | WEIGHT: 194.4 LBS | BODY MASS INDEX: 32.39 KG/M2 | DIASTOLIC BLOOD PRESSURE: 76 MMHG | OXYGEN SATURATION: 94 %

## 2021-11-16 DIAGNOSIS — Z09 POSTOP CHECK: Primary | ICD-10-CM

## 2021-11-16 PROCEDURE — 99024 POSTOP FOLLOW-UP VISIT: CPT | Performed by: SURGERY

## 2021-11-16 NOTE — PROGRESS NOTES
Derek Leiva is a 43 y.o. female who presents today for follow-up after laparoscopic cholecystectomy. She was previously seen for her 1st postop visit and that time was having a lot of pain in her epigastric incision site. This was felt to mostly be related to muscle pain and she was started on a short course of Flexeril which did provide relief. She is no longer taking any sort of pain control and states that she is now having minimal pain. No new issues since last seen.     Past Medical History:   Diagnosis Date    Anxiety     Bipolar 1 disorder ([facility]     COPD (chronic obstructive pulmonary disease) ([facility]     Headache     Major depressive disorder     PTSD (post-traumatic stress disorder)     Substance abuse ([facility]     recovering alcoholic and drug [id number]-42 mo sober       Past Surgical History:   Procedure Laterality Date    APPENDECTOMY      BACK SURGERY      BUNIONECTOMY Left 06/23/2020    BUNIONECTOMY Left 06/23/2020    Left FOOT BUNIONECTOMY stemmed implant performed by Marc Brannon DPM at [address] [place] [address], LAPAROSCOPIC  10/18/2021    Dr. Albert Bryan EXTRACTION         Current Outpatient Medications   Medication Sig Dispense Refill    ondansetron (ZOFRAN) 4 MG tablet       HYDROcodone-acetaminophen (Warnell Bills) 5-325 MG per tablet  (Patient not taking: Reported on 10/26/2021)      docusate sodium (COLACE) 100 MG capsule       cariprazine hcl (VRAYLAR) 3 MG CAPS capsule Take 1 capsule by mouth daily 30 capsule 2    propranolol (INDERAL) 20 MG tablet Take 1 tablet by mouth 3 times daily 90 tablet 1    busPIRone (BUSPAR) 10 MG tablet Take 1 tablet by mouth 3 times daily as needed (anxiety) 90 tablet 3    omeprazole (PRILOSEC) 40 MG delayed release capsule Take 1 capsule by mouth every morning (before breakfast) 30 capsule 1    fluticasone-umeclidin-vilant (Maire Render ELLIPTA) 100-62.5-25 MCG/INH AEPB Inhale 1 puff into the lungs daily 30 each 5     No current facility-administered medications for this visit.        Allergies   Allergen Reactions    Latex      Powder in rubber gloves      Clindamycin/Lincomycin     Dust Mite Extract     Other      Pet dander    Pcn [Penicillins]      Including Amoxicillin    Pollen Extract        Family History   Problem Relation Age of Onset    Heart Disease Mother     High Blood Pressure Mother     High Cholesterol Mother     Emphysema Mother     COPD Mother     Stroke Mother     Kidney Disease Father     High Blood Pressure Father     Cancer Maternal Grandmother         breast    Cancer Paternal Grandfather         lung       Social History     Socioeconomic History    Marital status: Legally      Spouse name: Not on file    Number of children: Not on file    Years of education: Not on file    Highest education level: Not on file   Occupational History    Not on file   Tobacco Use    Smoking status: Former Smoker     Packs/day: 0.25     Years: 25.00     Pack years: 6.25     Types: Cigarettes     Start date: 1995     Quit date: 10/1/2019     Years since quittin.1    Smokeless tobacco: Never Used   Vaping Use    Vaping Use: Every day    Substances: Nicotine    Devices: Disposable   Substance and Sexual Activity    Alcohol use: No     Comment: recovering alcholic quit 8712    Drug use: Yes     Types: Marijuana Wilian Blow)     Comment: medical marijuana    Sexual activity: Yes     Partners: Male   Other Topics Concern    Not on file   Social History Narrative    Not on file     Social Determinants of Health     Financial Resource Strain: Unknown    Difficulty of Paying Living Expenses: Patient refused   Food Insecurity: Unknown    Worried About Running Out of Food in the Last Year: Patient refused    Ran Out of Food in the Last Year: Patient refused   Transportation Needs:     Lack of Transportation (Medical): Not on file    Lack of Transportation (Non-Medical): Not on file   Physical Activity:     Days of Exercise per Week: Not on file    Minutes of Exercise per Session: Not on file   Stress:     Feeling of Stress : Not on file   Social Connections:     Frequency of Communication with Friends and Family: Not on file    Frequency of Social Gatherings with Friends and Family: Not on file    Attends Religion Services: Not on file    Active Member of [address] [place] [address] or Organizations: Not on file    Attends Club or Organization Meetings: Not on file    Marital Status: Not on file   Intimate Partner Violence:     Fear of Current or Ex-Partner: Not on file    Emotionally Abused: Not on file    Physically Abused: Not on file    Sexually Abused: Not on file   Housing Stability:     Unable to Pay for Housing in the Last Year: Not on file    Number of [place] in the Last Year: Not on file    Unstable Housing in the Last Year: Not on file       ROS:   Review of Systems - Negative except as noted in HPI      Objective   Vitals:    11/16/21 0954   BP: 102/76   Pulse: 72   Temp: 98.7 °F (37.1 °C)   SpO2: 94%     General:in no apparent distress and well developed and well nourished  Eyes: No gross abnormalities. Ears, Nose, Throat: hearing grossly normal bilaterally  Neck: neck supple and non tender without mass  Lungs: clear to auscultation without wheezes or rales   Heart: S1S2, no mumurs, RRR  Abdomen: Soft, nondistended, no tenderness to palpation, bowel sounds present. Incisions all intact and healing as expected. Extremity: negative  Neuro: CN II-XII grossly intact      1. Postop check  Assessment & Plan:  Patient reports significant improvement in pain symptoms from prior check. She did take some of the Flexeril but is no longer requiring any sort of medication for pain control. She has now returned to work with activity restrictions and is tolerating this.  Seems that probably most of her symptoms last time were related to just postoperative pain issues. I discussed continued activity restrictions for full 6 weeks after surgery at which time she may return to activity as tolerated. Patient will follow up as needed. She is encouraged to call with any questions concerns or problems.          (Please note that portions of this note were completed with a voice recognition program.  Efforts were made to edit the dictations but occasionally words are mis-transcribed.)

## 2021-11-16 NOTE — ASSESSMENT & PLAN NOTE
Patient reports significant improvement in pain symptoms from prior check. She did take some of the Flexeril but is no longer requiring any sort of medication for pain control. She has now returned to work with activity restrictions and is tolerating this. Seems that probably most of her symptoms last time were related to just postoperative pain issues. I discussed continued activity restrictions for full 6 weeks after surgery at which time she may return to activity as tolerated. Patient will follow up as needed. She is encouraged to call with any questions concerns or problems.

## 2021-11-30 ENCOUNTER — TELEPHONE (OUTPATIENT)
Dept: FAMILY MEDICINE CLINIC | Age: 36
End: 2021-11-30

## 2021-11-30 NOTE — TELEPHONE ENCOUNTER
Called twice to have ER records faxed to office. Would you like to wait on records, or possibly write letter?
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
## 2021-11-30 NOTE — TELEPHONE ENCOUNTER
Patient called again. She needs it by the end of day. Patient states she is now having a lot of dizziness and vomiting, states she has been vomiting for an hour. Writer stated if she is having increased dizziness and vomiting she should be evaluated- patient declined.

## 2021-12-02 ENCOUNTER — APPOINTMENT (OUTPATIENT)
Dept: CT IMAGING | Age: 36
End: 2021-12-02
Payer: COMMERCIAL

## 2021-12-02 ENCOUNTER — HOSPITAL ENCOUNTER (EMERGENCY)
Age: 36
Discharge: HOME | End: 2021-12-02
Attending: EMERGENCY MEDICINE
Payer: COMMERCIAL

## 2021-12-02 VITALS
BODY MASS INDEX: 30.66 KG/M2 | RESPIRATION RATE: 16 BRPM | WEIGHT: 184 LBS | TEMPERATURE: 97.5 F | DIASTOLIC BLOOD PRESSURE: 86 MMHG | HEIGHT: 65 IN | OXYGEN SATURATION: 100 % | SYSTOLIC BLOOD PRESSURE: 115 MMHG | HEART RATE: 58 BPM

## 2021-12-02 DIAGNOSIS — F07.81 POSTCONCUSSIVE SYNDROME: Primary | ICD-10-CM

## 2021-12-02 DIAGNOSIS — R74.8 ELEVATED LIVER ENZYMES: ICD-10-CM

## 2021-12-02 LAB
-: ABNORMAL
ABSOLUTE EOS #: 0.37 K/UL (ref 0–0.44)
ABSOLUTE IMMATURE GRANULOCYTE: <0.03 K/UL (ref 0–0.3)
ABSOLUTE LYMPH #: 2.34 K/UL (ref 1.1–3.7)
ABSOLUTE MONO #: 0.49 K/UL (ref 0.1–1.2)
ALBUMIN SERPL-MCNC: 4 G/DL (ref 3.5–5.2)
ALBUMIN/GLOBULIN RATIO: 1.2 (ref 1–2.5)
ALP BLD-CCNC: 223 U/L (ref 35–104)
ALT SERPL-CCNC: 289 U/L (ref 5–33)
AMORPHOUS: ABNORMAL
AMYLASE: 68 U/L (ref 28–100)
ANION GAP SERPL CALCULATED.3IONS-SCNC: 9 MMOL/L (ref 9–17)
AST SERPL-CCNC: 101 U/L
BACTERIA: ABNORMAL
BASOPHILS # BLD: 1 % (ref 0–2)
BASOPHILS ABSOLUTE: 0.04 K/UL (ref 0–0.2)
BILIRUB SERPL-MCNC: 0.99 MG/DL (ref 0.3–1.2)
BILIRUBIN URINE: NEGATIVE
BUN BLDV-MCNC: 10 MG/DL (ref 6–20)
BUN/CREAT BLD: 18 (ref 9–20)
CALCIUM SERPL-MCNC: 9 MG/DL (ref 8.6–10.4)
CASTS UA: ABNORMAL /LPF (ref 0–2)
CHLORIDE BLD-SCNC: 103 MMOL/L (ref 98–107)
CO2: 26 MMOL/L (ref 20–31)
COLOR: NORMAL
COMMENT UA: NORMAL
CREAT SERPL-MCNC: 0.57 MG/DL (ref 0.5–0.9)
CRYSTALS, UA: ABNORMAL /HPF
DIFFERENTIAL TYPE: ABNORMAL
EOSINOPHILS RELATIVE PERCENT: 5 % (ref 1–4)
EPITHELIAL CELLS UA: ABNORMAL /HPF (ref 0–5)
GFR AFRICAN AMERICAN: >60 ML/MIN
GFR NON-AFRICAN AMERICAN: >60 ML/MIN
GFR SERPL CREATININE-BSD FRML MDRD: ABNORMAL ML/MIN/{1.73_M2}
GFR SERPL CREATININE-BSD FRML MDRD: ABNORMAL ML/MIN/{1.73_M2}
GLUCOSE BLD-MCNC: 107 MG/DL (ref 70–99)
GLUCOSE URINE: NEGATIVE
HCG QUALITATIVE: NEGATIVE
HCT VFR BLD CALC: 40.1 % (ref 36.3–47.1)
HEMOGLOBIN: 13.4 G/DL (ref 11.9–15.1)
IMMATURE GRANULOCYTES: 0 %
KETONES, URINE: NEGATIVE
LEUKOCYTE ESTERASE, URINE: NEGATIVE
LIPASE: 83 U/L (ref 13–60)
LYMPHOCYTES # BLD: 33 % (ref 24–43)
MCH RBC QN AUTO: 30.5 PG (ref 25.2–33.5)
MCHC RBC AUTO-ENTMCNC: 33.4 G/DL (ref 25.2–33.5)
MCV RBC AUTO: 91.1 FL (ref 82.6–102.9)
MONOCYTES # BLD: 7 % (ref 3–12)
MUCUS: ABNORMAL
NITRITE, URINE: NEGATIVE
NRBC AUTOMATED: 0 PER 100 WBC
OTHER OBSERVATIONS UA: ABNORMAL
PDW BLD-RTO: 13.5 % (ref 11.8–14.4)
PH UA: 6 (ref 5–6)
PLATELET # BLD: 279 K/UL (ref 138–453)
PLATELET ESTIMATE: ABNORMAL
PMV BLD AUTO: 9.9 FL (ref 8.1–13.5)
POTASSIUM SERPL-SCNC: 3.6 MMOL/L (ref 3.7–5.3)
PROTEIN UA: NEGATIVE
RBC # BLD: 4.4 M/UL (ref 3.95–5.11)
RBC # BLD: ABNORMAL 10*6/UL
RBC UA: ABNORMAL /HPF (ref 0–4)
RENAL EPITHELIAL, UA: ABNORMAL /HPF
SEG NEUTROPHILS: 54 % (ref 36–65)
SEGMENTED NEUTROPHILS ABSOLUTE COUNT: 3.91 K/UL (ref 1.5–8.1)
SODIUM BLD-SCNC: 138 MMOL/L (ref 135–144)
SPECIFIC GRAVITY UA: 1.01 (ref 1.01–1.02)
TOTAL PROTEIN: 7.3 G/DL (ref 6.4–8.3)
TRICHOMONAS: ABNORMAL
TURBIDITY: NORMAL
URINE HGB: NEGATIVE
UROBILINOGEN, URINE: NORMAL
WBC # BLD: 7.2 K/UL (ref 3.5–11.3)
WBC # BLD: ABNORMAL 10*3/UL
WBC UA: ABNORMAL /HPF (ref 0–4)
YEAST: ABNORMAL

## 2021-12-02 PROCEDURE — 99285 EMERGENCY DEPT VISIT HI MDM: CPT

## 2021-12-02 PROCEDURE — 84703 CHORIONIC GONADOTROPIN ASSAY: CPT

## 2021-12-02 PROCEDURE — 82150 ASSAY OF AMYLASE: CPT

## 2021-12-02 PROCEDURE — 81001 URINALYSIS AUTO W/SCOPE: CPT

## 2021-12-02 PROCEDURE — 80053 COMPREHEN METABOLIC PANEL: CPT

## 2021-12-02 PROCEDURE — 2580000003 HC RX 258: Performed by: EMERGENCY MEDICINE

## 2021-12-02 PROCEDURE — 96374 THER/PROPH/DIAG INJ IV PUSH: CPT

## 2021-12-02 PROCEDURE — 85025 COMPLETE CBC W/AUTO DIFF WBC: CPT

## 2021-12-02 PROCEDURE — 36415 COLL VENOUS BLD VENIPUNCTURE: CPT

## 2021-12-02 PROCEDURE — 70450 CT HEAD/BRAIN W/O DYE: CPT

## 2021-12-02 PROCEDURE — 74176 CT ABD & PELVIS W/O CONTRAST: CPT

## 2021-12-02 PROCEDURE — 83690 ASSAY OF LIPASE: CPT

## 2021-12-02 PROCEDURE — 6360000002 HC RX W HCPCS: Performed by: EMERGENCY MEDICINE

## 2021-12-02 RX ORDER — IBUPROFEN 800 MG/1
800 TABLET ORAL EVERY 8 HOURS PRN
Qty: 30 TABLET | Refills: 0 | Status: SHIPPED | OUTPATIENT
Start: 2021-12-02 | End: 2022-09-26

## 2021-12-02 RX ORDER — ONDANSETRON 2 MG/ML
4 INJECTION INTRAMUSCULAR; INTRAVENOUS ONCE
Status: COMPLETED | OUTPATIENT
Start: 2021-12-02 | End: 2021-12-02

## 2021-12-02 RX ORDER — ONDANSETRON 4 MG/1
4 TABLET, ORALLY DISINTEGRATING ORAL EVERY 8 HOURS PRN
Qty: 20 TABLET | Refills: 0 | Status: SHIPPED | OUTPATIENT
Start: 2021-12-02 | End: 2021-12-14

## 2021-12-02 RX ORDER — 0.9 % SODIUM CHLORIDE 0.9 %
1000 INTRAVENOUS SOLUTION INTRAVENOUS ONCE
Status: COMPLETED | OUTPATIENT
Start: 2021-12-02 | End: 2021-12-02

## 2021-12-02 RX ADMIN — ONDANSETRON 4 MG: 2 INJECTION INTRAMUSCULAR; INTRAVENOUS at 18:23

## 2021-12-02 RX ADMIN — SODIUM CHLORIDE 1000 ML: 9 INJECTION, SOLUTION INTRAVENOUS at 18:43

## 2021-12-02 ASSESSMENT — ENCOUNTER SYMPTOMS
COUGH: 0
EYE PAIN: 0
ABDOMINAL PAIN: 0
BACK PAIN: 0
DIARRHEA: 0
CONSTIPATION: 0
NAUSEA: 1
VOMITING: 1
SHORTNESS OF BREATH: 0
BLOOD IN STOOL: 0
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
## 2021-12-02 NOTE — Clinical Note
Kaylen Pandey was seen and treated in our emergency department on 12/2/2021. She may return to work on 12/04/2021. If you have any questions or concerns, please don't hesitate to call.       Michelle Garcia MD

## 2021-12-06 ENCOUNTER — TELEPHONE (OUTPATIENT)
Dept: FAMILY MEDICINE CLINIC | Age: 36
End: 2021-12-06

## 2021-12-13 ENCOUNTER — TELEPHONE (OUTPATIENT)
Dept: FAMILY MEDICINE CLINIC | Age: 36
End: 2021-12-13

## 2021-12-13 NOTE — TELEPHONE ENCOUNTER
Pt calling stating she doesn't see her neurosurgeon until 12-30 they told pt to call PCP to see if we can call in some gabapentin, pt uses pended pharmacy, please advise.

## 2021-12-14 ENCOUNTER — HOSPITAL ENCOUNTER (OUTPATIENT)
Dept: LAB | Age: 36
Discharge: HOME | End: 2021-12-14
Payer: COMMERCIAL

## 2021-12-14 ENCOUNTER — OFFICE VISIT (OUTPATIENT)
Dept: SURGERY | Age: 36
End: 2021-12-14
Payer: COMMERCIAL

## 2021-12-14 VITALS
HEIGHT: 65 IN | SYSTOLIC BLOOD PRESSURE: 112 MMHG | DIASTOLIC BLOOD PRESSURE: 70 MMHG | HEART RATE: 62 BPM | BODY MASS INDEX: 30.66 KG/M2 | OXYGEN SATURATION: 97 % | TEMPERATURE: 97.8 F | WEIGHT: 184 LBS

## 2021-12-14 DIAGNOSIS — R74.8 ELEVATED LIVER ENZYMES: Primary | ICD-10-CM

## 2021-12-14 DIAGNOSIS — R74.8 ELEVATED LIVER ENZYMES: ICD-10-CM

## 2021-12-14 LAB
ALBUMIN SERPL-MCNC: 4.2 G/DL (ref 3.5–5.2)
ALBUMIN/GLOBULIN RATIO: 1.4 (ref 1–2.5)
ALP BLD-CCNC: 169 U/L (ref 35–104)
ALT SERPL-CCNC: 116 U/L (ref 5–33)
AST SERPL-CCNC: 109 U/L
BILIRUB SERPL-MCNC: 1.01 MG/DL (ref 0.3–1.2)
BILIRUBIN DIRECT: 0.2 MG/DL
BILIRUBIN, INDIRECT: 0.81 MG/DL (ref 0–1)
GLOBULIN: 3.1 G/DL (ref 1.5–3.8)
TOTAL PROTEIN: 7.3 G/DL (ref 6.4–8.3)

## 2021-12-14 PROCEDURE — 80076 HEPATIC FUNCTION PANEL: CPT

## 2021-12-14 PROCEDURE — 99213 OFFICE O/P EST LOW 20 MIN: CPT | Performed by: SURGERY

## 2021-12-14 PROCEDURE — 36415 COLL VENOUS BLD VENIPUNCTURE: CPT

## 2021-12-14 RX ORDER — ONDANSETRON 4 MG/1
4 TABLET, FILM COATED ORAL 3 TIMES DAILY PRN
Qty: 15 TABLET | Refills: 0 | Status: SHIPPED | OUTPATIENT
Start: 2021-12-14 | End: 2022-01-06
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
## 2021-12-14 NOTE — TELEPHONE ENCOUNTER
Spoke to pt, stated that she hasn't been on gabapentin in years. Writer also pulled OARRS, gabapentin not seen. Pt's [place] appt is 1/4, per pt. Advised pt to come into UC to be evaluated for her back pain. Did advise pt, since she has an appt with Gen Surg today at 3pm, she could be seen before or after her appt. Gave pt UC hours. Pt voiced understanding.

## 2021-12-16 PROBLEM — Z09 POSTOP CHECK: Status: RESOLVED | Noted: 2021-10-26 | Resolved: 2021-12-16

## 2022-01-06 ENCOUNTER — HOSPITAL ENCOUNTER (OUTPATIENT)
Age: 37
Setting detail: SPECIMEN
Discharge: HOME | End: 2022-01-06
Payer: COMMERCIAL

## 2022-01-06 ENCOUNTER — OFFICE VISIT (OUTPATIENT)
Dept: PRIMARY CARE CLINIC | Age: 37
End: 2022-01-06
Payer: COMMERCIAL

## 2022-01-06 VITALS
DIASTOLIC BLOOD PRESSURE: 74 MMHG | OXYGEN SATURATION: 98 % | BODY MASS INDEX: 29.95 KG/M2 | SYSTOLIC BLOOD PRESSURE: 112 MMHG | HEART RATE: 74 BPM | TEMPERATURE: 98.2 F | WEIGHT: 180 LBS

## 2022-01-06 DIAGNOSIS — N30.01 ACUTE CYSTITIS WITH HEMATURIA: Primary | ICD-10-CM

## 2022-01-06 DIAGNOSIS — Z20.2 STD EXPOSURE: ICD-10-CM

## 2022-01-06 DIAGNOSIS — N89.8 VAGINAL DISCHARGE: ICD-10-CM

## 2022-01-06 DIAGNOSIS — R30.0 DYSURIA: ICD-10-CM

## 2022-01-06 LAB
-: ABNORMAL
AMORPHOUS: ABNORMAL
BACTERIA: ABNORMAL
BILIRUBIN URINE: NEGATIVE
CASTS UA: ABNORMAL /LPF (ref 0–2)
COLOR: ABNORMAL
COMMENT UA: ABNORMAL
CRYSTALS, UA: ABNORMAL /HPF
EPITHELIAL CELLS UA: ABNORMAL /HPF (ref 0–5)
GLUCOSE URINE: NEGATIVE
KETONES, URINE: NEGATIVE
LEUKOCYTE ESTERASE, URINE: ABNORMAL
MUCUS: ABNORMAL
NITRITE, URINE: NEGATIVE
OTHER OBSERVATIONS UA: ABNORMAL
PH UA: 5.5 (ref 5–6)
PROTEIN UA: NEGATIVE
RBC UA: ABNORMAL /HPF (ref 0–4)
RENAL EPITHELIAL, UA: ABNORMAL /HPF
SPECIFIC GRAVITY UA: 1.02 (ref 1.01–1.02)
TRICHOMONAS: ABNORMAL
TURBIDITY: ABNORMAL
URINE HGB: ABNORMAL
UROBILINOGEN, URINE: NORMAL
WBC UA: ABNORMAL /HPF (ref 0–4)
YEAST: ABNORMAL

## 2022-01-06 PROCEDURE — 87591 N.GONORRHOEAE DNA AMP PROB: CPT

## 2022-01-06 PROCEDURE — 99214 OFFICE O/P EST MOD 30 MIN: CPT | Performed by: FAMILY MEDICINE

## 2022-01-06 PROCEDURE — 87510 GARDNER VAG DNA DIR PROBE: CPT

## 2022-01-06 PROCEDURE — 81001 URINALYSIS AUTO W/SCOPE: CPT

## 2022-01-06 PROCEDURE — 87480 CANDIDA DNA DIR PROBE: CPT

## 2022-01-06 PROCEDURE — 87660 TRICHOMONAS VAGIN DIR PROBE: CPT

## 2022-01-06 PROCEDURE — 87491 CHLMYD TRACH DNA AMP PROBE: CPT

## 2022-01-06 PROCEDURE — 87086 URINE CULTURE/COLONY COUNT: CPT

## 2022-01-06 RX ORDER — NITROFURANTOIN 25; 75 MG/1; MG/1
100 CAPSULE ORAL 2 TIMES DAILY
Qty: 14 CAPSULE | Refills: 0 | Status: SHIPPED | OUTPATIENT
Start: 2022-01-06 | End: 2022-01-13

## 2022-01-06 RX ORDER — GABAPENTIN 100 MG/1
300 CAPSULE ORAL 3 TIMES DAILY
COMMUNITY
End: 2022-09-26 | Stop reason: SDUPTHER

## 2022-01-06 ASSESSMENT — PATIENT HEALTH QUESTIONNAIRE - PHQ9
SUM OF ALL RESPONSES TO PHQ9 QUESTIONS 1 & 2: 0
1. LITTLE INTEREST OR PLEASURE IN DOING THINGS: 0
SUM OF ALL RESPONSES TO PHQ QUESTIONS 1-9: 0
2. FEELING DOWN, DEPRESSED OR HOPELESS: 0
SUM OF ALL RESPONSES TO PHQ QUESTIONS 1-9: 0

## 2022-01-06 ASSESSMENT — ENCOUNTER SYMPTOMS
COUGH: 0
SHORTNESS OF BREATH: 0
BACK PAIN: 1
VOMITING: 0
NAUSEA: 0

## 2022-01-06 NOTE — PROGRESS NOTES
[email or web address]    Smokeless tobacco: Never Used   Substance Use Topics    Alcohol use: No     Comment: recovering alcholic quit [id number]     Current Outpatient Medications   Medication Sig Dispense Refill    gabapentin (NEURONTIN) 100 MG capsule Take 100 mg by mouth 3 times daily.  propranolol (INDERAL) 20 MG tablet Take 1 tablet by mouth 3 times daily 90 tablet 1    busPIRone (BUSPAR) 10 MG tablet Take 1 tablet by mouth 3 times daily as needed (anxiety) 90 tablet 3    nitrofurantoin, macrocrystal-monohydrate, (MACROBID) 100 MG capsule Take 1 capsule by mouth 2 times daily for 7 days 14 capsule 0    ibuprofen (ADVIL;MOTRIN) 800 MG tablet Take 1 tablet by mouth every 8 hours as needed for Pain 30 tablet 0    cariprazine hcl (VRAYLAR) 3 MG CAPS capsule Take 1 capsule by mouth daily (Patient not taking: Reported on 2022) 30 capsule 2    fluticasone-umeclidin-vilant (TRELEGY ELLIPTA) 100-62.5-25 MCG/INH AEPB Inhale 1 puff into the lungs daily (Patient not taking: Reported on 2022) 30 each 5     No current facility-administered medications for this visit. Allergies   Allergen Reactions    Latex      Powder in rubber gloves      Clindamycin/Lincomycin     Dust Mite Extract     Other      Pet dander    Pcn [Penicillins]      Including Amoxicillin    Pollen Extract        Subjective:     Review of Systems   Constitutional: Negative for appetite change, chills, fatigue and fever. Respiratory: Negative for cough and shortness of breath. Gastrointestinal: Negative for nausea and vomiting. Genitourinary: Positive for dysuria, frequency and vaginal discharge (white). Negative for difficulty urinating, flank pain, hematuria and urgency. Musculoskeletal: Positive for back pain. Objective:      Physical Exam  Vitals and nursing note reviewed. Exam conducted with a chaperone present. Constitutional:       General: She is not in acute distress.      Appearance: She is essentially normal.    Return if symptoms worsen or fail to improve. Orders Placed This Encounter   Procedures    C.trachomatis N.gonorrhoeae DNA, Urine     Standing Status:   Future     Number of Occurrences:   1     Standing Expiration Date:   1/7/2023    Vaginitis DNA Probe     Standing Status:   Future     Standing Expiration Date:   1/6/2023    Urinalysis Reflex to Culture     Standing Status:   Future     Number of Occurrences:   1     Standing Expiration Date:   1/6/2023     Order Specific Question:   SPECIFY(EX-CATH,MIDSTREAM,CYSTO,ETC)? Answer:   midstream         Patientgiven educational materials - see patient instructions. Discussed use, benefit,and side effects of prescribed medications. All patient questions answered. Ptvoiced understanding. Reviewed health maintenance. Instructed to continue currentmedications, diet and exercise. Patient agreed with treatment plan. Follow up asdirected.      Electronically signed by Cali Hernández MD on 1/6/2022 at 8:18 PM

## 2022-01-07 LAB
C. TRACHOMATIS DNA ,URINE: NEGATIVE
CANDIDA SPECIES, DNA PROBE: POSITIVE
GARDNERELLA VAGINALIS, DNA PROBE: NEGATIVE
N. GONORRHOEAE DNA, URINE: NEGATIVE
SOURCE: ABNORMAL
SPECIMEN DESCRIPTION: NORMAL
TRICHOMONAS VAGINALIS DNA: NEGATIVE

## 2022-01-08 LAB
CULTURE: NO GROWTH
Lab: NORMAL
SPECIMEN DESCRIPTION: NORMAL

## 2022-01-10 RX ORDER — FLUCONAZOLE 150 MG/1
TABLET ORAL
Qty: 2 TABLET | Refills: 0 | Status: SHIPPED | OUTPATIENT
Start: 2022-01-10 | End: 2022-09-26

## 2022-01-11 ENCOUNTER — HOSPITAL ENCOUNTER (OUTPATIENT)
Dept: MRI IMAGING | Age: 37
Discharge: HOME | End: 2022-01-13
Payer: COMMERCIAL

## 2022-01-11 DIAGNOSIS — M54.16 LUMBAR RADICULOPATHY: ICD-10-CM

## 2022-01-11 PROCEDURE — 72148 MRI LUMBAR SPINE W/O DYE: CPT

## 2022-01-29 ENCOUNTER — HOSPITAL ENCOUNTER (OUTPATIENT)
Age: 37
Setting detail: SPECIMEN
Discharge: HOME | End: 2022-01-29
Payer: COMMERCIAL

## 2022-01-29 ENCOUNTER — OFFICE VISIT (OUTPATIENT)
Dept: PRIMARY CARE CLINIC | Age: 37
End: 2022-01-29
Payer: COMMERCIAL

## 2022-01-29 VITALS
HEART RATE: 65 BPM | WEIGHT: 179.13 LBS | SYSTOLIC BLOOD PRESSURE: 112 MMHG | RESPIRATION RATE: 18 BRPM | HEIGHT: 65 IN | BODY MASS INDEX: 29.84 KG/M2 | TEMPERATURE: 98 F | DIASTOLIC BLOOD PRESSURE: 76 MMHG | OXYGEN SATURATION: 99 %

## 2022-01-29 DIAGNOSIS — Z20.822 SUSPECTED COVID-19 VIRUS INFECTION: Primary | ICD-10-CM

## 2022-01-29 DIAGNOSIS — Z20.822 SUSPECTED COVID-19 VIRUS INFECTION: ICD-10-CM

## 2022-01-29 PROCEDURE — U0005 INFEC AGEN DETEC AMPLI PROBE: HCPCS

## 2022-01-29 PROCEDURE — 99213 OFFICE O/P EST LOW 20 MIN: CPT | Performed by: NURSE PRACTITIONER

## 2022-01-29 PROCEDURE — U0003 INFECTIOUS AGENT DETECTION BY NUCLEIC ACID (DNA OR RNA); SEVERE ACUTE RESPIRATORY SYNDROME CORONAVIRUS 2 (SARS-COV-2) (CORONAVIRUS DISEASE [COVID-19]), AMPLIFIED PROBE TECHNIQUE, MAKING USE OF HIGH THROUGHPUT TECHNOLOGIES AS DESCRIBED BY CMS-2020-01-R: HCPCS

## 2022-01-29 RX ORDER — BUTALBITAL, ACETAMINOPHEN AND CAFFEINE 50; 325; 40 MG/1; MG/1; MG/1
1 TABLET ORAL EVERY 4 HOURS PRN
Qty: 20 TABLET | Refills: 0 | Status: SHIPPED | OUTPATIENT
Start: 2022-01-29 | End: 2022-09-19 | Stop reason: SDUPTHER

## 2022-01-29 ASSESSMENT — PATIENT HEALTH QUESTIONNAIRE - PHQ9
2. FEELING DOWN, DEPRESSED OR HOPELESS: 0
SUM OF ALL RESPONSES TO PHQ9 QUESTIONS 1 & 2: 0
SUM OF ALL RESPONSES TO PHQ QUESTIONS 1-9: 0
1. LITTLE INTEREST OR PLEASURE IN DOING THINGS: 0

## 2022-01-29 ASSESSMENT — ENCOUNTER SYMPTOMS
VOMITING: 0
FLU SYMPTOMS: 1
DIARRHEA: 1
NAUSEA: 1
SORE THROAT: 1

## 2022-01-29 NOTE — PROGRESS NOTES
Subjective:      Patient ID: Waleska Saucedo is a 43 y.o. female coming in for   Chief Complaint   Patient presents with    Headache     started yesterday, chills, fatigue, body aches, diarrhea        Influenza  This is a new problem. The current episode started yesterday. Associated symptoms include chills, headaches, myalgias, nausea and a sore throat. Pertinent negatives include no vomiting. Nothing aggravates the symptoms. She has tried NSAIDs for the symptoms. The treatment provided mild relief. Review of Systems   Constitutional: Positive for chills. HENT: Positive for sore throat. Gastrointestinal: Positive for diarrhea and nausea. Negative for vomiting. Musculoskeletal: Positive for myalgias. Neurological: Positive for headaches. All other systems reviewed and are negative. Objective:  /76 (Site: Right Upper Arm, Position: Sitting, Cuff Size: Medium Adult)   Pulse 65   Temp 98 °F (36.7 °C) (Tympanic)   Resp 18   Ht 5' 5\" (1.651 m)   Wt 179 lb 2 oz (81.3 kg)   LMP 01/01/2022 (Exact Date)   SpO2 99%   BMI 29.81 kg/m²      Physical Exam  Vitals and nursing note reviewed. Constitutional:       General: She is not in acute distress. Appearance: Normal appearance. She is not ill-appearing. HENT:      Head: Normocephalic. Mouth/Throat:      Mouth: Mucous membranes are moist.      Pharynx: Oropharynx is clear. No oropharyngeal exudate or posterior oropharyngeal erythema. Cardiovascular:      Rate and Rhythm: Normal rate and regular rhythm. Heart sounds: Normal heart sounds. Pulmonary:      Effort: Pulmonary effort is normal.      Breath sounds: Normal breath sounds. Abdominal:      General: Bowel sounds are normal.      Palpations: Abdomen is soft. Tenderness: There is no abdominal tenderness. Musculoskeletal:      Right lower leg: No edema. Left lower leg: No edema. Skin:     General: Skin is warm and dry. Findings: No rash. Neurological:      General: No focal deficit present. Mental Status: She is alert and oriented to person, place, and time. Assessment:      1. Suspected COVID-19 virus infection           Plan:   -symptoms not consistent with influenza, no fever here today  -covid pending  -continue with supportive care       Orders Placed This Encounter   Procedures    COVID-19     Standing Status:   Future     Standing Expiration Date:   1/29/2023     Scheduling Instructions:      1) Due to current limited availability of the COVID-19 test, tests will be prioritized based on responses to questions above. Testing may be delayed due to volume. 2) Print and instruct patient to adhere to CDC home isolation program. (Link Above)              3) Set up or refer patient for a monitoring program.              4) Have patient sign up for and leverage [employer] (if not previously done). Order Specific Question:   Is this test for diagnosis or screening? Answer:   Diagnosis of ill patient     Order Specific Question:   Symptomatic for COVID-19 as defined by CDC? Answer:   Yes     Order Specific Question:   Date of Symptom Onset     Answer:   1/28/2022     Order Specific Question:   Hospitalized for COVID-19? Answer:   No     Order Specific Question:   Admitted to ICU for COVID-19? Answer:   No     Order Specific Question:   Employed in healthcare setting? Answer:   No     Order Specific Question:   Resident in a congregate (group) care setting? Answer:   No     Order Specific Question:   Pregnant? Answer:   No     Order Specific Question:   Previously tested for COVID-19?      Answer:   Yes      Outpatient Encounter Medications as of 1/29/2022   Medication Sig Dispense Refill    butalbital-acetaminophen-caffeine (FIORICET, ESGIC) -40 MG per tablet Take 1 tablet by mouth every 4 hours as needed for Headaches 20 tablet 0    ibuprofen (ADVIL;MOTRIN) 800 MG tablet Take 1 tablet by mouth every 8 hours as needed for Pain 30 tablet 0    propranolol (INDERAL) 20 MG tablet Take 1 tablet by mouth 3 times daily 90 tablet 1    busPIRone (BUSPAR) 10 MG tablet Take 1 tablet by mouth 3 times daily as needed (anxiety) 90 tablet 3    fluconazole (DIFLUCAN) 150 MG tablet Take 1 pill now, 1 pill in a week. (Patient not taking: Reported on 1/29/2022) 2 tablet 0    gabapentin (NEURONTIN) 100 MG capsule Take 100 mg by mouth 3 times daily.  cariprazine hcl (VRAYLAR) 3 MG CAPS capsule Take 1 capsule by mouth daily (Patient not taking: Reported on 1/6/2022) 30 capsule 2    fluticasone-umeclidin-vilant (TRELEGY ELLIPTA) 100-62.5-25 MCG/INH AEPB Inhale 1 puff into the lungs daily (Patient not taking: Reported on 1/6/2022) 30 each 5     No facility-administered encounter medications on file as of 1/29/2022.             Bill Rubi, APRN - CNP

## 2022-01-30 LAB
SARS-COV-2: NORMAL
SARS-COV-2: NOT DETECTED
SOURCE: NORMAL

## 2022-04-23 ENCOUNTER — HOSPITAL ENCOUNTER (EMERGENCY)
Age: 37
Discharge: HOME | End: 2022-04-23
Attending: EMERGENCY MEDICINE
Payer: COMMERCIAL

## 2022-04-23 VITALS
BODY MASS INDEX: 29.99 KG/M2 | HEART RATE: 60 BPM | WEIGHT: 180 LBS | HEIGHT: 65 IN | SYSTOLIC BLOOD PRESSURE: 103 MMHG | TEMPERATURE: 97.2 F | RESPIRATION RATE: 16 BRPM | DIASTOLIC BLOOD PRESSURE: 52 MMHG | OXYGEN SATURATION: 99 %

## 2022-04-23 DIAGNOSIS — K04.7 DENTAL INFECTION: Primary | ICD-10-CM

## 2022-04-23 PROCEDURE — 6370000000 HC RX 637 (ALT 250 FOR IP): Performed by: EMERGENCY MEDICINE

## 2022-04-23 PROCEDURE — 99284 EMERGENCY DEPT VISIT MOD MDM: CPT

## 2022-04-23 PROCEDURE — 6360000002 HC RX W HCPCS: Performed by: EMERGENCY MEDICINE

## 2022-04-23 PROCEDURE — 96372 THER/PROPH/DIAG INJ SC/IM: CPT

## 2022-04-23 RX ORDER — CLINDAMYCIN HYDROCHLORIDE 150 MG/1
300 CAPSULE ORAL ONCE
Status: COMPLETED | OUTPATIENT
Start: 2022-04-23 | End: 2022-04-23

## 2022-04-23 RX ORDER — ONDANSETRON 4 MG/1
4 TABLET, FILM COATED ORAL EVERY 8 HOURS PRN
Qty: 21 TABLET | Refills: 0 | Status: SHIPPED | OUTPATIENT
Start: 2022-04-23 | End: 2022-09-26

## 2022-04-23 RX ORDER — KETOROLAC TROMETHAMINE 30 MG/ML
30 INJECTION, SOLUTION INTRAMUSCULAR; INTRAVENOUS ONCE
Status: COMPLETED | OUTPATIENT
Start: 2022-04-23 | End: 2022-04-23

## 2022-04-23 RX ORDER — ONDANSETRON 4 MG/1
4 TABLET, ORALLY DISINTEGRATING ORAL ONCE
Status: COMPLETED | OUTPATIENT
Start: 2022-04-23 | End: 2022-04-23

## 2022-04-23 RX ORDER — CLINDAMYCIN HYDROCHLORIDE 150 MG/1
450 CAPSULE ORAL 3 TIMES DAILY
Qty: 63 CAPSULE | Refills: 0 | Status: SHIPPED | OUTPATIENT
Start: 2022-04-23 | End: 2022-04-30

## 2022-04-23 RX ADMIN — KETOROLAC TROMETHAMINE 30 MG: 30 INJECTION, SOLUTION INTRAMUSCULAR at 02:48

## 2022-04-23 RX ADMIN — CLINDAMYCIN HYDROCHLORIDE 300 MG: 150 CAPSULE ORAL at 03:37

## 2022-04-23 RX ADMIN — ONDANSETRON 4 MG: 4 TABLET, ORALLY DISINTEGRATING ORAL at 03:37

## 2022-04-23 ASSESSMENT — PAIN SCALES - GENERAL
PAINLEVEL_OUTOF10: 2
PAINLEVEL_OUTOF10: 7
PAINLEVEL_OUTOF10: 7

## 2022-04-23 ASSESSMENT — ENCOUNTER SYMPTOMS
NAUSEA: 0
SHORTNESS OF BREATH: 0
FACIAL SWELLING: 1
VOMITING: 0

## 2022-04-23 ASSESSMENT — PAIN - FUNCTIONAL ASSESSMENT: PAIN_FUNCTIONAL_ASSESSMENT: 0-10

## 2022-04-23 NOTE — ED PROVIDER NOTES
[facility] ED  [address]  [place] [address]  Phone: [phone]  eMERGENCY dEPARTMENT eNCOUnter      Pt Name: Nan Oneill  MRN: [id number]  [place] 1985  Date of evaluation: 4/23/22      CHIEF COMPLAINT     Chief Complaint   Patient presents with    Dental Pain       HISTORY OF PRESENT ILLNESS    Nan Oneill is a 43 y.o. female who presents today with right upper dental pain and facial swelling that occurred earlier today. She states that it is causing her to have a headache. No visual changes. No pain with eye movement. No nausea no vomiting no difficulty breathing or swallowing. Patient indicates that she recently had back surgery. She does not have any more pain medications left. Patient states that she is here for antibiotics. She states that she seen a dentist before but cannot afford the procedures of they are recommending. She states that she has an allergy to penicillin and amoxicillin which includes hives. She states that she does not tolerate clindamycin is caused her to have vomiting in the past.  She describes this is vomiting immediately after taking the pill as it causes her to have burning sensation in her chest.    REVIEW OF SYSTEMS     Review of Systems   Constitutional: Negative for fever. HENT: Positive for dental problem and facial swelling. Respiratory: Negative for shortness of breath. Cardiovascular: Negative for chest pain. Gastrointestinal: Negative for nausea and vomiting. Skin: Negative for rash. Neurological: Positive for headaches. Negative for syncope. All other systems reviewed and are negative. PAST MEDICAL HISTORY    has a past medical history of Anxiety, Bipolar 1 disorder ([facility] [address] COPD (chronic obstructive pulmonary disease) ([facility] [address] Headache, Major depressive disorder, PTSD (post-traumatic stress disorder), and Substance abuse ([facility] [address]     SURGICAL HISTORY      has a past surgical history that includes Appendectomy; Tonsillectomy; Mandible fracture surgery; Tubal ligation; back surgery;  section; Echo tooth extraction; Bunionectomy (Left, 2020); Bunionectomy (Left, 2020); and Cholecystectomy, laparoscopic (10/18/2021). CURRENT MEDICATIONS       Discharge Medication List as of 2022  3:50 AM      CONTINUE these medications which have NOT CHANGED    Details   butalbital-acetaminophen-caffeine (FIORICET, ESGIC) -40 MG per tablet Take 1 tablet by mouth every 4 hours as needed for Headaches, Disp-20 tablet, R-0Normal      fluconazole (DIFLUCAN) 150 MG tablet Take 1 pill now, 1 pill in a week., Disp-2 tablet, R-0Normal      gabapentin (NEURONTIN) 100 MG capsule Take 100 mg by mouth 3 times daily. Historical Med      ibuprofen (ADVIL;MOTRIN) 800 MG tablet Take 1 tablet by mouth every 8 hours as needed for Pain, Disp-30 tablet, R-0Normal      cariprazine hcl (VRAYLAR) 3 MG CAPS capsule Take 1 capsule by mouth daily, Disp-30 capsule, R-2Normal      propranolol (INDERAL) 20 MG tablet Take 1 tablet by mouth 3 times daily, Disp-90 tablet, R-1Normal      busPIRone (BUSPAR) 10 MG tablet Take 1 tablet by mouth 3 times daily as needed (anxiety), Disp-90 tablet, R-3Normal      fluticasone-umeclidin-vilant (TRELEGY ELLIPTA) 100-62.5-25 MCG/INH AEPB Inhale 1 puff into the lungs daily, Disp-30 each, R-5Normal             ALLERGIES     is allergic to latex, dust mite extract, other, pcn [penicillins], pollen extract, and clindamycin/lincomycin. FAMILY HISTORY     She indicated that her mother is . She indicated that her father is . She indicated that both of her sisters are alive. She indicated that her maternal grandmother is . She indicated that her maternal grandfather is . She indicated that her paternal grandmother is alive. She indicated that her paternal grandfather is .      family history includes COPD in her mother; Cancer in her maternal grandmother and paternal grandfather; Emphysema in her mother; Heart Disease in her mother; High Blood Pressure in her father and mother; High Cholesterol in her mother; Kidney Disease in her father; Stroke in her mother. SOCIAL HISTORY      reports that she quit smoking about 2 years ago. Her smoking use included cigarettes. She started smoking about 27 years ago. She has a 6.25 pack-year smoking history. She has never used smokeless tobacco. She reports current drug use. Drug: Marijuana [employer]). She reports that she does not drink alcohol. PHYSICAL EXAM     INITIAL VITALS:  height is 5' 5\" (1.651 m) and weight is 180 lb (81.6 kg). Her tympanic temperature is 97.2 °F (36.2 °C). Her blood pressure is 103/52 (abnormal) and her pulse is 60. Her respiration is 16 and oxygen saturation is 99%. Physical Exam  Vitals reviewed. Constitutional:       General: She is in acute distress. Appearance: Normal appearance. She is not toxic-appearing. HENT:      Head: Normocephalic and atraumatic. Right Ear: Tympanic membrane normal.      Left Ear: Tympanic membrane normal.      Nose: Nose normal.      Mouth/Throat:      Mouth: Mucous membranes are moist.      Comments: Patient with overall poor dentition and previous bridges. I do not appreciate any periapical swelling. Patient has pain in the right upper quadrant as well as some right-sided facial swelling without overlying erythema or palpable abscess. Extraocular eye movements are intact without pain. Eyes:      Extraocular Movements: Extraocular movements intact. Pupils: Pupils are equal, round, and reactive to light. Cardiovascular:      Rate and Rhythm: Normal rate and regular rhythm. Pulses: Normal pulses. Heart sounds: Normal heart sounds. Pulmonary:      Effort: Pulmonary effort is normal. No respiratory distress. Breath sounds: Normal breath sounds. Musculoskeletal:         General: No swelling or tenderness. Normal range of motion. Cervical back: Normal range of motion and neck supple. No rigidity. Right lower leg: No edema. Left lower leg: No edema. Lymphadenopathy:      Cervical: Cervical adenopathy present. Skin:     General: Skin is warm and dry. Capillary Refill: Capillary refill takes less than 2 seconds. Findings: No erythema or rash. Neurological:      General: No focal deficit present. Mental Status: She is alert and oriented to person, place, and time. Cranial Nerves: No cranial nerve deficit. Sensory: No sensory deficit. Motor: No weakness. DIFFERENTIAL DIAGNOSIS / MDM / EMERGENCY DEPARTMENT COURSE:     At this point the patient does have mild facial swelling but there is no evidence of abscess formation. Onset was rather rapid I do not feel that she needs CT imaging or blood work at this time however I did discuss with the patient if she does not have improvement with antibiotics or if she has additional swelling developed erythema was not improving with oral antibiotic she needs to return to the emergency department for further work-up including blood work and CT imaging. I have also explained to her that if she has pain with eye movement that she needs to return as well. Patient is agreeable to Toradol injection. She seemed to have some improvement of her pain. She expresses quite a bit of frustration with her dental situation as well as her recent back surgery and being off of work. Discussed with patient recommended to biotic bridging but would typically be clindamycin given her symptoms as this is not seem to be a true allergy. Discussed liquid versus opening up the capsules. Patient was initially resistant to this. Offered alternative therapy combination of Keflex with Cipro and Flagyl. Patient ultimately wanted to try the clindamycin. She tolerated in the emergency department with Zofran without any apparent difficulty.   Patient is prescribed clindamycin and Zofran. She is educated on the warning signs which return to the emergency department she had no further questions or concerns. I did review her [facility] report and she had 42 tablets of Norco filled as prescribed from her surgeon 10 days ago. At this point I am not going to prescribe any further opioid medications. I have reviewed the disposition diagnosis with the patient and or their family/guardian. I have answered their questions and givendischarge instructions. They voiced understanding of these instructions and did not have any further questions or complaints. DIAGNOSTIC RESULTS     EKG: All EKG's are interpreted by the Emergency Department Physician who either signs or Co-signs this chart inthe absence of a cardiologist.        RADIOLOGY:   Radiologist interpretation of radiologic studies:     No results found. LABS:  No results found for this visit on 04/23/22. EMERGENCY DEPARTMENT COURSE:   Vitals:    Vitals:    04/23/22 0214 04/23/22 0357   BP: 139/85 (!) 103/52   Pulse: 72 60   Resp: 16 16   Temp: 97.2 °F (36.2 °C)    TempSrc: Tympanic    SpO2: 99% 99%   Weight: 180 lb (81.6 kg)    Height: 5' 5\" (1.651 m)      -------------------------  BP: (!) 103/52, Temp: 97.2 °F (36.2 °C), Pulse: 60, Resp: 16    CONSULTS:  None    PROCEDURES:  None    FINAL IMPRESSION      1.  Dental infection          DISPOSITION/PLAN   DISPOSITION Decision To Discharge 04/23/2022 03:40:05 AM      PATIENT REFERRED TO:  Angie Camargo MD  [address] [place] [address],[address] [facility] [address]  [phone]    In 2 days        DISCHARGE MEDICATIONS:  Discharge Medication List as of 4/23/2022  3:50 AM      START taking these medications    Details   ondansetron (ZOFRAN) 4 MG tablet Take 1 tablet by mouth every 8 hours as needed for Nausea, Disp-21 tablet, R-0Normal      clindamycin (CLEOCIN) 150 MG capsule Take 3 capsules by mouth 3 times daily for 7 days, Disp-63 capsule, R-0Normal             There are no active hospital problems to display for this patient.       (Please note that portions of this note were completed with avoice recognition program.  Efforts were made to edit the dictations but occasionally words are mis-transcribed.)    Beto Menjivar MD, [facility]  Attending Emergency Medicine Physician        Beto Menjivar MD  04/23/22 5266

## 2022-04-23 NOTE — ED NOTES
Pt able to take medication without difficulty. Asked pt when her dentist appt was and she said that she refused to go to the dentist because \"they want to much money\".      Jonelle Nuñez RN  04/23/22 3626

## 2022-05-07 ENCOUNTER — HOSPITAL ENCOUNTER (EMERGENCY)
Age: 37
Discharge: HOME | End: 2022-05-07
Attending: EMERGENCY MEDICINE
Payer: COMMERCIAL

## 2022-05-07 VITALS
DIASTOLIC BLOOD PRESSURE: 74 MMHG | BODY MASS INDEX: 31 KG/M2 | OXYGEN SATURATION: 98 % | SYSTOLIC BLOOD PRESSURE: 110 MMHG | WEIGHT: 186.1 LBS | TEMPERATURE: 98.4 F | RESPIRATION RATE: 20 BRPM | HEART RATE: 56 BPM | HEIGHT: 65 IN

## 2022-05-07 DIAGNOSIS — R19.7 DIARRHEA, UNSPECIFIED TYPE: Primary | ICD-10-CM

## 2022-05-07 LAB
-: NORMAL
ABSOLUTE EOS #: 0.22 K/UL (ref 0–0.44)
ABSOLUTE IMMATURE GRANULOCYTE: <0.03 K/UL (ref 0–0.3)
ABSOLUTE LYMPH #: 3.19 K/UL (ref 1.1–3.7)
ABSOLUTE MONO #: 0.76 K/UL (ref 0.1–1.2)
ANION GAP SERPL CALCULATED.3IONS-SCNC: 11 MMOL/L (ref 9–17)
BACTERIA: NORMAL
BASOPHILS # BLD: 1 % (ref 0–2)
BASOPHILS ABSOLUTE: 0.08 K/UL (ref 0–0.2)
BILIRUBIN URINE: NEGATIVE
BUN BLDV-MCNC: 14 MG/DL (ref 6–20)
BUN/CREAT BLD: 20 (ref 9–20)
CALCIUM SERPL-MCNC: 9 MG/DL (ref 8.6–10.4)
CHLORIDE BLD-SCNC: 102 MMOL/L (ref 98–107)
CO2: 24 MMOL/L (ref 20–31)
CREAT SERPL-MCNC: 0.71 MG/DL (ref 0.5–0.9)
EOSINOPHILS RELATIVE PERCENT: 2 % (ref 1–4)
EPITHELIAL CELLS UA: NORMAL /HPF (ref 0–5)
GFR AFRICAN AMERICAN: >60 ML/MIN
GFR NON-AFRICAN AMERICAN: >60 ML/MIN
GFR SERPL CREATININE-BSD FRML MDRD: NORMAL ML/MIN/{1.73_M2}
GLUCOSE BLD-MCNC: 93 MG/DL (ref 70–99)
GLUCOSE URINE: NEGATIVE
HCT VFR BLD CALC: 41 % (ref 36.3–47.1)
HEMOGLOBIN: 13.4 G/DL (ref 11.9–15.1)
IMMATURE GRANULOCYTES: 0 %
KETONES, URINE: NEGATIVE
LEUKOCYTE ESTERASE, URINE: NEGATIVE
LYMPHOCYTES # BLD: 34 % (ref 24–43)
MCH RBC QN AUTO: 29.6 PG (ref 25.2–33.5)
MCHC RBC AUTO-ENTMCNC: 32.7 G/DL (ref 25.2–33.5)
MCV RBC AUTO: 90.7 FL (ref 82.6–102.9)
MONOCYTES # BLD: 8 % (ref 3–12)
NITRITE, URINE: NEGATIVE
NRBC AUTOMATED: 0 PER 100 WBC
PDW BLD-RTO: 13.2 % (ref 11.8–14.4)
PH UA: 7 (ref 5–6)
PLATELET # BLD: 434 K/UL (ref 138–453)
PMV BLD AUTO: 9.3 FL (ref 8.1–13.5)
POTASSIUM SERPL-SCNC: 4 MMOL/L (ref 3.7–5.3)
PROTEIN UA: NEGATIVE
RBC # BLD: 4.52 M/UL (ref 3.95–5.11)
RBC UA: NORMAL /HPF (ref 0–4)
SEG NEUTROPHILS: 55 % (ref 36–65)
SEGMENTED NEUTROPHILS ABSOLUTE COUNT: 5.17 K/UL (ref 1.5–8.1)
SODIUM BLD-SCNC: 137 MMOL/L (ref 135–144)
SPECIFIC GRAVITY UA: 1.01 (ref 1.01–1.02)
URINE HGB: NEGATIVE
UROBILINOGEN, URINE: NORMAL
WBC # BLD: 9.4 K/UL (ref 3.5–11.3)
WBC UA: NORMAL /HPF (ref 0–4)

## 2022-05-07 PROCEDURE — 99283 EMERGENCY DEPT VISIT LOW MDM: CPT

## 2022-05-07 PROCEDURE — 36415 COLL VENOUS BLD VENIPUNCTURE: CPT

## 2022-05-07 PROCEDURE — 85025 COMPLETE CBC W/AUTO DIFF WBC: CPT

## 2022-05-07 PROCEDURE — 81001 URINALYSIS AUTO W/SCOPE: CPT

## 2022-05-07 PROCEDURE — 80048 BASIC METABOLIC PNL TOTAL CA: CPT

## 2022-05-07 ASSESSMENT — PAIN - FUNCTIONAL ASSESSMENT: PAIN_FUNCTIONAL_ASSESSMENT: 0-10

## 2022-05-07 ASSESSMENT — PAIN DESCRIPTION - PAIN TYPE: TYPE: ACUTE PAIN

## 2022-05-07 ASSESSMENT — PAIN DESCRIPTION - LOCATION: LOCATION: ABDOMEN

## 2022-05-07 ASSESSMENT — PAIN DESCRIPTION - ORIENTATION: ORIENTATION: RIGHT

## 2022-05-07 ASSESSMENT — PAIN SCALES - GENERAL: PAINLEVEL_OUTOF10: 4

## 2022-05-08 ENCOUNTER — HOSPITAL ENCOUNTER (OUTPATIENT)
Age: 37
Setting detail: SPECIMEN
Discharge: HOME | End: 2022-05-08
Payer: COMMERCIAL

## 2022-05-08 PROCEDURE — 82705 FATS/LIPIDS FECES QUAL: CPT

## 2022-05-08 PROCEDURE — 87425 ROTAVIRUS AG IA: CPT

## 2022-05-08 PROCEDURE — 87506 IADNA-DNA/RNA PROBE TQ 6-11: CPT

## 2022-05-08 NOTE — ED NOTES
Pt attempted to obtain stool sample. Pt unable to obtain. Smear of brown stool noted in collection hat. Amount not enough for collection. Pt appears anxious and states she is hot and uncomfortable. MD aware.       Ruben Bhakta RN  05/07/22 7572

## 2022-05-08 NOTE — ED PROVIDER NOTES
eMERGENCY dEPARTMENT eNCOUnter      Pt Name: Mary White  MRN: [id number]  [place] 1985  Date of evaluation: 2022      CHIEF COMPLAINT       Chief Complaint   Patient presents with    Diarrhea     Think i may have sepsis or diarrhea. HISTORY OF PRESENT ILLNESS    Mary White is a 43 y.o. female who presents area. Patient states she has been having problems about a week but over the last 2 days has been significantly worse she was on clindamycin which she finished about a week ago she denies any fever or chills although she states she feels real sweaty and hot she denies any vomiting eating drinking fine urinating fine states she has had at least 25 episodes of diarrhea at least 10 over the last hour or so and there entire time span she has been here she has not had 1 episode she is not sweaty here nor has she been running any fevers        REVIEW OF SYSTEMS       Review of systems are all reviewed and negative except stated above in HPI    [address]    has a past medical history of Anxiety, Bipolar 1 disorder ([facility] [address] COPD (chronic obstructive pulmonary disease) ([facility] [address] Headache, Major depressive disorder, PTSD (post-traumatic stress disorder), and Substance abuse ([facility] [address] SURGICAL HISTORY      has a past surgical history that includes Appendectomy; Tonsillectomy; Mandible fracture surgery; Tubal ligation; back surgery;  section; [place] tooth extraction; Bunionectomy (Left, 2020); Bunionectomy (Left, 2020); and Cholecystectomy, laparoscopic (10/18/2021).     CURRENT MEDICATIONS       Previous Medications    BUSPIRONE (BUSPAR) 10 MG TABLET    Take 1 tablet by mouth 3 times daily as needed (anxiety)    BUTALBITAL-ACETAMINOPHEN-CAFFEINE (FIORICET, ESGIC) -40 MG PER TABLET    Take 1 tablet by mouth every 4 hours as needed for Headaches    CARIPRAZINE HCL (VRAYLAR) 3 MG CAPS CAPSULE    Take 1 capsule by mouth daily    FLUCONAZOLE (DIFLUCAN) 150 MG TABLET    Take 1 pill now, 1 pill in a week. FLUTICASONE-UMECLIDIN-VILANT (TRELEGY ELLIPTA) 100-62.5-25 MCG/INH AEPB    Inhale 1 puff into the lungs daily    GABAPENTIN (NEURONTIN) 100 MG CAPSULE    Take 300 mg by mouth 3 times daily. IBUPROFEN (ADVIL;MOTRIN) 800 MG TABLET    Take 1 tablet by mouth every 8 hours as needed for Pain    ONDANSETRON (ZOFRAN) 4 MG TABLET    Take 1 tablet by mouth every 8 hours as needed for Nausea    PROPRANOLOL (INDERAL) 20 MG TABLET    Take 1 tablet by mouth 3 times daily       ALLERGIES     is allergic to latex, dust mite extract, other, pcn [penicillins], pollen extract, and clindamycin/lincomycin. FAMILY HISTORY     She indicated that her mother is . She indicated that her father is . She indicated that both of her sisters are alive. She indicated that her maternal grandmother is . She indicated that her maternal grandfather is . She indicated that her paternal grandmother is alive. She indicated that her paternal grandfather is . family history includes COPD in her mother; Cancer in her maternal grandmother and paternal grandfather; Emphysema in her mother; Heart Disease in her mother; High Blood Pressure in her father and mother; High Cholesterol in her mother; Kidney Disease in her father; Stroke in her mother. SOCIAL HISTORY      reports that she quit smoking about 2 years ago. Her smoking use included e-cigarettes. She started smoking about 27 years ago. She has a 6.25 pack-year smoking history. She has never used smokeless tobacco. She reports current drug use. Drug: Marijuana [place]). She reports that she does not drink alcohol. PHYSICAL EXAM     INITIAL VITALS:  height is 5' 5\" (1.651 m) and weight is 186 lb 1.6 oz (84.4 kg). Her tympanic temperature is 98.4 °F (36.9 °C). Her blood pressure is 110/74 and her pulse is 56. Her respiration is 20 and oxygen saturation is 98%.       General: Patient alert nontoxic-appearing female in no apparent distress  HEENT: Head is atraumatic conjunctiva are clear mouth shows moist mucous membranes  Neck: Supple  Respiratory: Lung sounds are clear bilateral  Cardiac: Heart is mildly bradycardic but regular  GI: Abdomen soft nontender bowel sounds active throughout  Neuro: Patient has no gross focal neurological deficits at bedside exam    DIFFERENTIAL DIAGNOSIS/ MDM:     Obtain baseline labs    DIAGNOSTIC RESULTS     EKG: All EKG's are interpreted by the Emergency Department Physician who either signs or Co-signs this chart in the absence of a cardiologist.        RADIOLOGY:   I directly visualized the following  images and reviewed the radiologist interpretations:  No orders to display         LABS:  Labs Reviewed   URINALYSIS WITH REFLEX TO CULTURE - Abnormal; Notable for the following components:       Result Value    pH, UA 7.0 (*)     All other components within normal limits   C. DIFFICILE TOXIN MOLECULAR   CBC WITH AUTO DIFFERENTIAL   BASIC METABOLIC PANEL   MICROSCOPIC URINALYSIS   ROTAVIRUS ANTIGEN, STOOL   FECAL FAT, QUALITATIVE         EMERGENCY DEPARTMENT COURSE:   Vitals:    Vitals:    05/07/22 2012 05/07/22 2013 05/07/22 2015   BP:  110/74 110/74   Pulse: 56     Resp: 20     Temp: 98.4 °F (36.9 °C)     TempSrc: Tympanic     SpO2: 99%  98%   Weight: 186 lb 1.6 oz (84.4 kg)     Height: 5' 5\" (1.651 m)       -------------------------  BP: 110/74, Temp: 98.4 °F (36.9 °C), Pulse: 56, Resp: 20    No orders of the defined types were placed in this encounter.           Re-evaluation Notes    Had no further episodes of diarrhea here she has remained afebrile no no sweating is been noted on her her labs so far all normal she was not able to give us a stool sample during care I did offer her further observation here waiting for that however she states she will go home with a prescription in a container follow-up as directed return if worse    CRITICAL CARE: None      CONSULTS:      PROCEDURES:  None    FINAL IMPRESSION      1. Diarrhea, unspecified type          DISPOSITION/PLAN   DISPOSITION Decision To Discharge 05/07/2022 09:07:14 PM      Condition on Disposition    Stable    PATIENT REFERRED TO:  Kayce William MD  [address] [place] [address],[address]  [phone]    In 1 day        DISCHARGE MEDICATIONS:  New Prescriptions    No medications on file       (Please note that portions of this note were completed with a voice recognition program.  Efforts were made to edit the dictations but occasionally words are mis-transcribed.)    Patsy Fritz MD,, MD, F.A.C.E.P.   Attending Emergency Physician        Patsy Fritz MD  05/07/22 7098

## 2022-05-10 LAB
CAMPYLOBACTER PCR: NORMAL
DIRECT EXAM: NORMAL
E COLI ENTEROTOXIGENIC PCR: NORMAL
PLESIOMONAS SHIGELLOIDES PCR: NORMAL
SALMONELLA PCR: NORMAL
SHIGATOXIN GENE PCR: NORMAL
SHIGELLA SP PCR: NORMAL
SPECIMEN DESCRIPTION: NORMAL
SPECIMEN DESCRIPTION: NORMAL
VIBRIO PCR: NORMAL
YERSINIA ENTEROCOLITICA PCR: NORMAL

## 2022-05-12 LAB
FAT QUALITATIVE SPLIT STOOL: NORMAL
FECAL NEUTRAL FAT: NORMAL

## 2022-05-12 NOTE — ED PROVIDER NOTES
Chart accessed to clarify ICD code for outpatient lab order. Dx code R19.7.           Candice Hawley MD  05/12/22 7796

## 2022-08-10 ENCOUNTER — TELEPHONE (OUTPATIENT)
Dept: FAMILY MEDICINE CLINIC | Age: 37
End: 2022-08-10

## 2022-08-10 NOTE — TELEPHONE ENCOUNTER
----- Message from Dee Dee Mercer sent at 8/9/2022  3:46 PM EDT -----  Subject: Refill Request    QUESTIONS  Name of Medication? cariprazine hcl (VRAYLAR) 3 MG CAPS capsule  Patient-reported dosage and instructions? 3mg Daily   How many days do you have left? 21  Preferred Pharmacy? [facility]  Pharmacy phone number (if available)? [phone]  Additional Information for Provider? Pt states she will need refill prior   to her next appt which is 9.26.22.   ---------------------------------------------------------------------------  --------------  CALL BACK INFO  What is the best way for the office to contact you? OK to leave message on   voicemail  Preferred Call Back Phone Number? [phone]  ---------------------------------------------------------------------------  --------------  SCRIPT ANSWERS  Relationship to Patient?  Self

## 2022-08-10 NOTE — TELEPHONE ENCOUNTER
Patient called requesting refill to loaded pharmacy. States she no longer smokes cigarettes, but she has been vaping for about 2 years and wants to quit. She also states she has been experiencing increased SOB for the last few days. She did have covid about a week-2 weeks ago, and she is experiencing lingering symptoms, not sure if covid related or worsening COPD. She states that even when standing at work and doing nothing, she has to take deep breaths because the SOB is that bad. Please advise.

## 2022-08-10 NOTE — TELEPHONE ENCOUNTER
Pt last seen 9/24/2021. Was issued 2 refills. Should had run out 11/202, also listed \"not taking\". Will address at ov.

## 2022-08-12 RX ORDER — NICOTINE 21 MG/24HR
1 PATCH, TRANSDERMAL 24 HOURS TRANSDERMAL EVERY 24 HOURS
Qty: 21 PATCH | Refills: 0 | Status: SHIPPED | OUTPATIENT
Start: 2022-08-12 | End: 2022-09-26 | Stop reason: SDUPTHER

## 2022-08-12 NOTE — TELEPHONE ENCOUNTER
Last seen 9/24/2021 - has upcoming appt on 9/26/2022. To address then?        Payton Morley called requesting a refill of the below medication which has been pended for you:     Requested Prescriptions     Pending Prescriptions Disp Refills    nicotine (NICODERM CQ) 14 MG/24HR  0     Sig: Place 1 patch onto the skin every 24 hours       Last Appointment Date: 1/6/2022  Next Appointment Date: 9/26/2022    Allergies   Allergen Reactions    Latex      Powder in rubber gloves      Dust Mite Extract     Other      Pet dander    Pcn [Penicillins] Hives     Including Amoxicillin    Pollen Extract     Clindamycin/Lincomycin Nausea And Vomiting

## 2022-09-19 ENCOUNTER — OFFICE VISIT (OUTPATIENT)
Dept: PRIMARY CARE CLINIC | Age: 37
End: 2022-09-19
Payer: COMMERCIAL

## 2022-09-19 VITALS
DIASTOLIC BLOOD PRESSURE: 70 MMHG | TEMPERATURE: 97.9 F | HEART RATE: 70 BPM | RESPIRATION RATE: 16 BRPM | WEIGHT: 190 LBS | SYSTOLIC BLOOD PRESSURE: 104 MMHG | OXYGEN SATURATION: 97 % | BODY MASS INDEX: 31.62 KG/M2

## 2022-09-19 DIAGNOSIS — F31.9 BIPOLAR 1 DISORDER (HCC): Primary | ICD-10-CM

## 2022-09-19 DIAGNOSIS — M62.838 TRAPEZIUS MUSCLE SPASM: ICD-10-CM

## 2022-09-19 PROCEDURE — 99214 OFFICE O/P EST MOD 30 MIN: CPT | Performed by: FAMILY MEDICINE

## 2022-09-19 PROCEDURE — 20552 NJX 1/MLT TRIGGER POINT 1/2: CPT | Performed by: FAMILY MEDICINE

## 2022-09-19 RX ORDER — BUSPIRONE HYDROCHLORIDE 10 MG/1
10 TABLET ORAL 3 TIMES DAILY PRN
Qty: 90 TABLET | Refills: 3 | Status: SHIPPED | OUTPATIENT
Start: 2022-09-19

## 2022-09-19 RX ORDER — BUTALBITAL, ACETAMINOPHEN AND CAFFEINE 50; 325; 40 MG/1; MG/1; MG/1
1 TABLET ORAL EVERY 4 HOURS PRN
Qty: 20 TABLET | Refills: 0 | Status: SHIPPED | OUTPATIENT
Start: 2022-09-19 | End: 2022-10-07 | Stop reason: SDUPTHER

## 2022-09-19 RX ORDER — CYCLOBENZAPRINE HCL 5 MG
5 TABLET ORAL NIGHTLY PRN
Qty: 30 TABLET | Refills: 0 | Status: SHIPPED | OUTPATIENT
Start: 2022-09-19 | End: 2022-09-29

## 2022-09-19 RX ORDER — PROPRANOLOL HYDROCHLORIDE 20 MG/1
20 TABLET ORAL 3 TIMES DAILY
Qty: 90 TABLET | Refills: 1 | Status: SHIPPED | OUTPATIENT
Start: 2022-09-19

## 2022-09-19 RX ORDER — TRIAMCINOLONE ACETONIDE 40 MG/ML
20 INJECTION, SUSPENSION INTRA-ARTICULAR; INTRAMUSCULAR ONCE
Status: COMPLETED | OUTPATIENT
Start: 2022-09-19 | End: 2022-09-19

## 2022-09-19 RX ADMIN — TRIAMCINOLONE ACETONIDE 20 MG: 40 INJECTION, SUSPENSION INTRA-ARTICULAR; INTRAMUSCULAR at 10:53

## 2022-09-19 ASSESSMENT — PATIENT HEALTH QUESTIONNAIRE - PHQ9
5. POOR APPETITE OR OVEREATING: 3
4. FEELING TIRED OR HAVING LITTLE ENERGY: 3
SUM OF ALL RESPONSES TO PHQ QUESTIONS 1-9: 25
SUM OF ALL RESPONSES TO PHQ9 QUESTIONS 1 & 2: 5
2. FEELING DOWN, DEPRESSED OR HOPELESS: 3
9. THOUGHTS THAT YOU WOULD BE BETTER OFF DEAD, OR OF HURTING YOURSELF: 2
8. MOVING OR SPEAKING SO SLOWLY THAT OTHER PEOPLE COULD HAVE NOTICED. OR THE OPPOSITE, BEING SO FIGETY OR RESTLESS THAT YOU HAVE BEEN MOVING AROUND A LOT MORE THAN USUAL: 3
6. FEELING BAD ABOUT YOURSELF - OR THAT YOU ARE A FAILURE OR HAVE LET YOURSELF OR YOUR FAMILY DOWN: 3
10. IF YOU CHECKED OFF ANY PROBLEMS, HOW DIFFICULT HAVE THESE PROBLEMS MADE IT FOR YOU TO DO YOUR WORK, TAKE CARE OF THINGS AT HOME, OR GET ALONG WITH OTHER PEOPLE: 2
1. LITTLE INTEREST OR PLEASURE IN DOING THINGS: 2
SUM OF ALL RESPONSES TO PHQ QUESTIONS 1-9: 23
7. TROUBLE CONCENTRATING ON THINGS, SUCH AS READING THE NEWSPAPER OR WATCHING TELEVISION: 3
SUM OF ALL RESPONSES TO PHQ QUESTIONS 1-9: 25
3. TROUBLE FALLING OR STAYING ASLEEP: 3
SUM OF ALL RESPONSES TO PHQ QUESTIONS 1-9: 25

## 2022-09-19 ASSESSMENT — ENCOUNTER SYMPTOMS
COUGH: 0
DIARRHEA: 0
NAUSEA: 0
ABDOMINAL PAIN: 0
CHEST TIGHTNESS: 0
CONSTIPATION: 0
SHORTNESS OF BREATH: 0
WHEEZING: 0

## 2022-09-19 ASSESSMENT — COLUMBIA-SUICIDE SEVERITY RATING SCALE - C-SSRS
5. HAVE YOU STARTED TO WORK OUT OR WORKED OUT THE DETAILS OF HOW TO KILL YOURSELF? DO YOU INTEND TO CARRY OUT THIS PLAN?: NO
1. WITHIN THE PAST MONTH, HAVE YOU WISHED YOU WERE DEAD OR WISHED YOU COULD GO TO SLEEP AND NOT WAKE UP?: YES
2. HAVE YOU ACTUALLY HAD ANY THOUGHTS OF KILLING YOURSELF?: YES
6. HAVE YOU EVER DONE ANYTHING, STARTED TO DO ANYTHING, OR PREPARED TO DO ANYTHING TO END YOUR LIFE?: NO
3. HAVE YOU BEEN THINKING ABOUT HOW YOU MIGHT KILL YOURSELF?: NO
4. HAVE YOU HAD THESE THOUGHTS AND HAD SOME INTENTION OF ACTING ON THEM?: NO

## 2022-09-19 NOTE — PROGRESS NOTES
[facility] [address] [place] [address]  [place] [address]  Dept: [phone]  Dept Fax: [phone]    Kaylen Pandey is a 42 y.o. female who presents today for her medical conditions/complaints as noted below. Kaylen Pandey is c/o of   Chief Complaint   Patient presents with    Headache     Neck and right shoulder pain causing headaches, not on depression medication at this time and wants meds refilled. Both ongoing for couple months. HPI:     HPI Here today for headaches and neck pain and depression. Patient has been having headaches caused by her neck and shoulders hurting. She is also having pain in her jaw. Patient has a history of headaches and migraines (since 10years old). She has been having constant headaches for the past week. Taking Advil and Alleve has not helped the pain at all. Patient thinks the pain is mainly related to the tightness in her neck. Patient denies trauma to her neck and thinks the pain is caused by looking down all day at work. She works as a  and uses her right arm and shoulder most of the time. Headache usually occurs on the right side or at the base of her head. Patient is also having sensitivity to light. The migraine usually starts with a sharp pain on the right side of her head. Other symptoms include nausea. Patient has been feeling depressed for a couple of months. She has been out of her medications for two months now so has not been taking anything for her mood. She ran out of her meds and didn't have an appointment scheduled so didn't get any refills. She is sleeping some but sometimes she is not sleeping much at all. She has been having panic attacks as well. She has been having issues leaving her house due to anxiety. She feels like she can't control her emotions. Things have just felt worse the past few days.  She is eating a little every 8 hours as needed for Nausea (Patient not taking: Reported on 5/7/2022) 21 tablet 0    fluconazole (DIFLUCAN) 150 MG tablet Take 1 pill now, 1 pill in a week. (Patient not taking: Reported on 1/29/2022) 2 tablet 0    gabapentin (NEURONTIN) 100 MG capsule Take 300 mg by mouth 3 times daily. (Patient not taking: Reported on 9/19/2022)      ibuprofen (ADVIL;MOTRIN) 800 MG tablet Take 1 tablet by mouth every 8 hours as needed for Pain (Patient not taking: Reported on 5/7/2022) 30 tablet 0    fluticasone-umeclidin-vilant (TRELEGY ELLIPTA) 100-62.5-25 MCG/INH AEPB Inhale 1 puff into the lungs daily (Patient not taking: Reported on 1/6/2022) 30 each 5     No current facility-administered medications for this visit. Allergies   Allergen Reactions    Latex      Powder in rubber gloves      Dust Mite Extract     Other      Pet dander    Pcn [Penicillins] Hives     Including Amoxicillin    Pollen Extract     Clindamycin/Lincomycin Nausea And Vomiting       Subjective:     Review of Systems   Constitutional:  Positive for activity change, appetite change and fatigue. Negative for fever. Eyes:  Negative for visual disturbance. Respiratory:  Negative for cough, chest tightness, shortness of breath and wheezing. Cardiovascular:  Negative for chest pain, palpitations and leg swelling. Gastrointestinal:  Negative for abdominal pain, constipation, diarrhea and nausea. Musculoskeletal:  Positive for neck pain and neck stiffness. Skin:  Negative for rash. Neurological:  Positive for light-headedness and headaches. Negative for dizziness and syncope. Psychiatric/Behavioral:  Positive for decreased concentration, dysphoric mood and sleep disturbance. Negative for hallucinations, self-injury and suicidal ideas. The patient is nervous/anxious. Objective:      Physical Exam  Vitals and nursing note reviewed. Constitutional:       General: She is not in acute distress.      Appearance: She is well-developed. Eyes:      Conjunctiva/sclera: Conjunctivae normal.   Neck:      Thyroid: No thyromegaly. Cardiovascular:      Rate and Rhythm: Normal rate and regular rhythm. Heart sounds: Normal heart sounds. No murmur heard. Pulmonary:      Effort: Pulmonary effort is normal. No respiratory distress. Breath sounds: Normal breath sounds. No wheezing. Musculoskeletal:      Cervical back: Normal range of motion and neck supple. Spasms and tenderness present. Back:    Lymphadenopathy:      Cervical: No cervical adenopathy. Skin:     General: Skin is warm and dry. Findings: No erythema or rash. Neurological:      Mental Status: She is alert and oriented to person, place, and time. Psychiatric:         Attention and Perception: Attention normal.         Mood and Affect: Mood is depressed. Affect is tearful. Speech: Speech normal.         Behavior: Behavior normal. Behavior is cooperative. Thought Content: Thought content includes suicidal ideation. Thought content does not include suicidal plan. Cognition and Memory: Cognition normal.         Judgment: Judgment normal.     /70 (Site: Left Upper Arm, Position: Sitting, Cuff Size: Large Adult)   Pulse 70   Temp 97.9 °F (36.6 °C) (Tympanic)   Resp 16   Wt 190 lb (86.2 kg)   SpO2 97%   BMI 31.62 kg/m²     Assessment:       Diagnosis Orders   1. Bipolar 1 disorder (HCC)  cariprazine hcl (VRAYLAR) 3 MG CAPS capsule      2. Trapezius muscle spasm  cyclobenzaprine (FLEXERIL) 5 MG tablet    CO INJECT TRIGGER POINT, 1 OR 2    triamcinolone acetonide (KENALOG-40) injection 20 mg                Plan:       Bipolar disorder: worsening; she has been really struggling without her medication and she is getting depressed and suicidal so I restarted her vraylar.  I reminded her that she really needs to find a psychiatrist.     Trapezius muscle spasm: new; I recommended using heat on the neck for 10 minutes three times a day and then doing her neck exercises after she uses the heat. I then told her to ice the neck afterwards. she was given both neck and shoulder exercises and a trigger point injection. A trigger point injection was performed at the site of maximal tenderness using 2% plain Lidocaine and 20mg of Kenalog. This was well tolerated, and followed by moderate relief of pain. Return if symptoms worsen or fail to improve. Orders Placed This Encounter   Procedures    WA INJECT TRIGGER POINT, 1 OR 2       Orders Placed This Encounter   Medications    busPIRone (BUSPAR) 10 MG tablet     Sig: Take 1 tablet by mouth 3 times daily as needed (anxiety)     Dispense:  90 tablet     Refill:  3    propranolol (INDERAL) 20 MG tablet     Sig: Take 1 tablet by mouth 3 times daily     Dispense:  90 tablet     Refill:  1    cariprazine hcl (VRAYLAR) 3 MG CAPS capsule     Sig: Take 1 capsule by mouth daily     Dispense:  30 capsule     Refill:  2    butalbital-acetaminophen-caffeine (FIORICET, ESGIC) -40 MG per tablet     Sig: Take 1 tablet by mouth every 4 hours as needed for Headaches     Dispense:  20 tablet     Refill:  0    cyclobenzaprine (FLEXERIL) 5 MG tablet     Sig: Take 1 tablet by mouth nightly as needed for Muscle spasms     Dispense:  30 tablet     Refill:  0    triamcinolone acetonide (KENALOG-40) injection 20 mg       Patientgiven educational materials - see patient instructions. Discussed use, benefit,and side effects of prescribed medications. All patient questions answered. Ptvoiced understanding. Reviewed health maintenance. Instructed to continue currentmedications, diet and exercise. Patient agreed with treatment plan. Follow up asdirected.      Electronically signed by Shira Pacheco MD on 9/20/2022 at 10:17 PM

## 2022-09-19 NOTE — LETTER
[address] [facility] department of [place] [address]  Phone: [phone]  Fax: [phone]    Hiram Merino MD        September 19, 2022     Patient: Abbie Gastelum   YOB: 1985   Date of Visit: 9/19/2022       To Whom It May Concern: It is my medical opinion that Abbie Gastelum may return to work on 9/20/22. .    If you have any questions or concerns, please don't hesitate to call.     Sincerely,        Hiram Merino MD

## 2022-09-26 ENCOUNTER — TELEPHONE (OUTPATIENT)
Dept: FAMILY MEDICINE CLINIC | Age: 37
End: 2022-09-26

## 2022-09-26 ENCOUNTER — OFFICE VISIT (OUTPATIENT)
Dept: FAMILY MEDICINE CLINIC | Age: 37
End: 2022-09-26
Payer: COMMERCIAL

## 2022-09-26 VITALS
SYSTOLIC BLOOD PRESSURE: 130 MMHG | DIASTOLIC BLOOD PRESSURE: 80 MMHG | OXYGEN SATURATION: 97 % | HEIGHT: 65 IN | BODY MASS INDEX: 31.65 KG/M2 | TEMPERATURE: 97.7 F | WEIGHT: 190 LBS | HEART RATE: 55 BPM

## 2022-09-26 DIAGNOSIS — M62.838 TRAPEZIUS MUSCLE SPASM: Primary | ICD-10-CM

## 2022-09-26 DIAGNOSIS — F31.9 BIPOLAR 1 DISORDER (HCC): ICD-10-CM

## 2022-09-26 DIAGNOSIS — Z12.4 CERVICAL CANCER SCREENING: ICD-10-CM

## 2022-09-26 DIAGNOSIS — M54.16 LUMBAR RADICULOPATHY: ICD-10-CM

## 2022-09-26 PROCEDURE — 99214 OFFICE O/P EST MOD 30 MIN: CPT | Performed by: FAMILY MEDICINE

## 2022-09-26 PROCEDURE — 20552 NJX 1/MLT TRIGGER POINT 1/2: CPT | Performed by: FAMILY MEDICINE

## 2022-09-26 RX ORDER — TRIAMCINOLONE ACETONIDE 40 MG/ML
20 INJECTION, SUSPENSION INTRA-ARTICULAR; INTRAMUSCULAR ONCE
Status: COMPLETED | OUTPATIENT
Start: 2022-09-26 | End: 2022-09-26

## 2022-09-26 RX ORDER — GABAPENTIN 100 MG/1
300 CAPSULE ORAL 3 TIMES DAILY
Qty: 90 CAPSULE | Refills: 2 | Status: SHIPPED | OUTPATIENT
Start: 2022-09-26 | End: 2022-10-07 | Stop reason: SDUPTHER

## 2022-09-26 RX ORDER — NICOTINE 21 MG/24HR
1 PATCH, TRANSDERMAL 24 HOURS TRANSDERMAL EVERY 24 HOURS
Qty: 42 PATCH | Refills: 0 | Status: SHIPPED | OUTPATIENT
Start: 2022-09-26 | End: 2023-09-26

## 2022-09-26 RX ADMIN — TRIAMCINOLONE ACETONIDE 20 MG: 40 INJECTION, SUSPENSION INTRA-ARTICULAR; INTRAMUSCULAR at 12:17

## 2022-09-26 RX ADMIN — TRIAMCINOLONE ACETONIDE 20 MG: 40 INJECTION, SUSPENSION INTRA-ARTICULAR; INTRAMUSCULAR at 12:16

## 2022-09-26 SDOH — ECONOMIC STABILITY: FOOD INSECURITY: WITHIN THE PAST 12 MONTHS, THE FOOD YOU BOUGHT JUST DIDN'T LAST AND YOU DIDN'T HAVE MONEY TO GET MORE.: PATIENT DECLINED

## 2022-09-26 SDOH — ECONOMIC STABILITY: FOOD INSECURITY: WITHIN THE PAST 12 MONTHS, YOU WORRIED THAT YOUR FOOD WOULD RUN OUT BEFORE YOU GOT MONEY TO BUY MORE.: PATIENT DECLINED

## 2022-09-26 ASSESSMENT — ENCOUNTER SYMPTOMS
CONSTIPATION: 0
DIARRHEA: 0
CHEST TIGHTNESS: 0
SHORTNESS OF BREATH: 1
BACK PAIN: 1
NAUSEA: 0
VOMITING: 0
WHEEZING: 0
ABDOMINAL PAIN: 0
COUGH: 0

## 2022-09-26 ASSESSMENT — SOCIAL DETERMINANTS OF HEALTH (SDOH): HOW HARD IS IT FOR YOU TO PAY FOR THE VERY BASICS LIKE FOOD, HOUSING, MEDICAL CARE, AND HEATING?: PATIENT DECLINED

## 2022-09-26 NOTE — PROGRESS NOTES
ARNULFO Johnson [address]  [address] [address]  Dept: [phone]  Dept Fax: [phone]  Loc: [phone]     Choco Diaz is a 42 y.o. female who presents today for her medical conditions/complaints as noted below. Choco Diaz is c/o of   Chief Complaint   Patient presents with    Annual Exam       HPI:     HPI Here today for a follow up of her neck pain, bipolar disorder and she would like to stop vaping. Patient has been doing better recently. Her neck is still bothering her, especially since her job requires her to have her head down a lot. The work environment is also loud and bright. The trigger point injection she got in her right trapezius did help, but she is still experiencing tightness on both sides. She is still getting frequent headaches and the Fioricet helped but she used all of those and does not have any more left. She also uses the Advil dual action, ibuprofen, and a Alleve (sometimes in conjunction) to help with her headaches. She has never done any exercises or physical therapy for her neck or shoulders. She sometimes does stretches for her neck at home and tries to sleep in certain positions to help. Her anxiety has been manageable and the Buspar helps. She takes 2-3 a day depending on the day. She uses them mostly at work. She has not had any panic attacks recently. Patient says her mood has been getting better. She has been getting out and socializing more within the past week and has more good days than bad days. The Margretta Bilis is really helping her. She states that she feels \"mellow\" and \"content\". Patient is inquiring about nicotine patches to try to stop vaping. 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 90 capsule 2    busPIRone (BUSPAR) 10 MG tablet Take 1 tablet by mouth 3 times daily as needed (anxiety) 90 tablet 3    propranolol (INDERAL) 20 MG tablet Take 1 tablet by mouth 3 times daily 90 tablet 1    cariprazine hcl (VRAYLAR) 3 MG CAPS capsule Take 1 capsule by mouth daily 30 capsule 2    butalbital-acetaminophen-caffeine (FIORICET, ESGIC) -40 MG per tablet Take 1 tablet by mouth every 4 hours as needed for Headaches 20 tablet 0    cyclobenzaprine (FLEXERIL) 5 MG tablet Take 1 tablet by mouth nightly as needed for Muscle spasms 30 tablet 0    fluticasone-umeclidin-vilant (TRELEGY ELLIPTA) 100-62.5-25 MCG/INH AEPB Inhale 1 puff into the lungs daily 30 each 5    fluconazole (DIFLUCAN) 150 MG tablet Take 1 pill now, 1 pill in a week. (Patient not taking: No sig reported) 2 tablet 0     No current facility-administered medications for this visit.           Allergies   Allergen Reactions    Latex      Powder in rubber gloves      Dust Mite Extract     Other      Pet dander    Pcn [Penicillins] Hives     Including Amoxicillin    Pollen Extract     Clindamycin/Lincomycin Nausea And Vomiting       Subjective:     Review of Systems   Constitutional:  Negative for activity change, appetite change, chills, fatigue and fever. Eyes:  Negative for visual disturbance. Respiratory:  Positive for shortness of breath (on exertion due to COPD). Negative for cough, chest tightness and wheezing. Cardiovascular:  Positive for palpitations (with her anxiety). Negative for chest pain and leg swelling. Gastrointestinal:  Negative for abdominal pain, constipation, diarrhea, nausea and vomiting. Genitourinary:  Negative for difficulty urinating. Musculoskeletal:  Positive for back pain, neck pain and neck stiffness. Negative for gait problem. Neurological:  Positive for numbness (in hands and legs. worse in right hip) and headaches. Negative for dizziness, syncope, weakness and light-headedness. Psychiatric/Behavioral:  Negative for dysphoric mood (significantly better) and sleep disturbance. The patient is not nervous/anxious (signficiantly better). Objective:      Physical Exam  Vitals and nursing note reviewed. Constitutional:       General: She is not in acute distress. Appearance: She is well-developed. Eyes:      Conjunctiva/sclera: Conjunctivae normal.   Neck:      Thyroid: No thyromegaly. Cardiovascular:      Rate and Rhythm: Normal rate and regular rhythm. Heart sounds: Normal heart sounds. No murmur heard. Pulmonary:      Effort: Pulmonary effort is normal. No respiratory distress. Breath sounds: Normal breath sounds. No wheezing. Musculoskeletal:      Cervical back: Normal range of motion and neck supple. Lymphadenopathy:      Cervical: No cervical adenopathy. Skin:     General: Skin is warm and dry. Findings: No erythema or rash. Neurological:      Mental Status: She is alert and oriented to person, place, and time. Psychiatric:         Mood and Affect: Mood normal.         Behavior: Behavior normal.         Thought Content:  Thought content normal.         Judgment: Judgment normal. /80   Pulse 55   Temp 97.7 °F (36.5 °C)   Ht 5' 5\" (1.651 m)   Wt 190 lb (86.2 kg)   SpO2 97%   BMI 31.62 kg/m²     Assessment:       Diagnosis Orders   1. Trapezius muscle spasm  DE INJECT TRIGGER POINT, 1 OR 2    triamcinolone acetonide (KENALOG-40) injection 20 mg    triamcinolone acetonide (KENALOG-40) injection 20 mg      2. Bipolar 1 disorder ([address]        3. Lumbar radiculopathy  gabapentin (NEURONTIN) 100 MG capsule      4. Cervical cancer screening  [address], Gynecology, [place]                Plan:       Trapezius muscle spasm: stable; she got a small amount of benefit from her trigger point injection a few weeks ago, but it did not last. She wanted another so I repeated it today. I also strongly recommended PT but she cannot afford it right now. A trigger point injection was performed at the site of maximal tenderness using 2% plain Lidocaine and 20mg of Kenalog  in each trapezius muscle. This was well tolerated, and followed by moderate relief of pain. Bipolar disorder: improving; she is doing much better since she is back on her vraylar and buspar. Lumbar radiculopathy: worsening; she has been struggling since being off of gabapentin so I restated it for her. Return in about 3 months (around 12/26/2022) for Anxiety follow up and neck  pain follow up.     Orders Placed This Encounter   Procedures    [address] [place] [address], [facility], Gynecology, [place]     Referral Priority:   Routine     Referral Type:   Eval and Treat     Referral Reason:   Specialty Services Required     Referred to Provider:   MARVIN Garcia CNM     Requested Specialty:   Certified Nurse Midwife     Number of Visits Requested:   1    DE INJECT TRIGGER POINT, 1 OR 2     Orders Placed This Encounter   Medications    nicotine (NICODERM CQ) 14 MG/24HR     Sig: Place 1 patch onto the skin every 24 hours     Dispense:  42 patch     Refill:  0    gabapentin (NEURONTIN) 100 MG capsule     Sig: Take 3 capsules by mouth 3 times daily for 30 doses. Dispense:  90 capsule     Refill:  2    triamcinolone acetonide (KENALOG-40) injection 20 mg    triamcinolone acetonide (KENALOG-40) injection 20 mg       Patientgiven educational materials - see patient instructions. Discussed use, benefit,and side effects of prescribed medications. All patient questions answered. Ptvoiced understanding. Reviewed health maintenance. Instructed to continue currentmedications, diet and exercise. Patient agreed with treatment plan. Follow up asdirected.      Electronically signed by Ara Villegas MD on 9/26/2022 at 12:38 PM

## 2022-10-07 ENCOUNTER — PATIENT MESSAGE (OUTPATIENT)
Dept: FAMILY MEDICINE CLINIC | Age: 37
End: 2022-10-07

## 2022-10-07 DIAGNOSIS — M54.16 LUMBAR RADICULOPATHY: ICD-10-CM

## 2022-10-07 RX ORDER — GABAPENTIN 300 MG/1
300 CAPSULE ORAL 3 TIMES DAILY
Qty: 90 CAPSULE | Refills: 2 | Status: SHIPPED | OUTPATIENT
Start: 2022-10-07 | End: 2022-10-17

## 2022-10-07 RX ORDER — BUTALBITAL, ACETAMINOPHEN AND CAFFEINE 50; 325; 40 MG/1; MG/1; MG/1
1 TABLET ORAL EVERY 6 HOURS PRN
Qty: 30 TABLET | Refills: 0 | Status: SHIPPED | OUTPATIENT
Start: 2022-10-07

## 2022-10-07 NOTE — TELEPHONE ENCOUNTER
From: Claudene Blunt  To: Dr. Sofia Woodard: 10/7/2022 6:32 AM EDT  Subject: Refills     Can I get a refill on the [address]? I was only given enough for a week. Also the gabapentin 100mg is not helping with my pain and nerve pain. Can the dose be increased with a new prescription? Thank you. When can I come in to urgent care and get more injections in my shoulders? I am still struggling due to my job and it's physical requirements. Again thank you so much!

## 2023-01-03 ENCOUNTER — OFFICE VISIT (OUTPATIENT)
Dept: FAMILY MEDICINE CLINIC | Age: 38
End: 2023-01-03
Payer: COMMERCIAL

## 2023-01-03 ENCOUNTER — TELEPHONE (OUTPATIENT)
Dept: FAMILY MEDICINE CLINIC | Age: 38
End: 2023-01-03

## 2023-01-03 VITALS
HEIGHT: 65 IN | TEMPERATURE: 97.3 F | OXYGEN SATURATION: 98 % | DIASTOLIC BLOOD PRESSURE: 68 MMHG | SYSTOLIC BLOOD PRESSURE: 100 MMHG | WEIGHT: 213 LBS | BODY MASS INDEX: 35.49 KG/M2 | HEART RATE: 63 BPM

## 2023-01-03 DIAGNOSIS — F31.9 BIPOLAR 1 DISORDER (HCC): Primary | ICD-10-CM

## 2023-01-03 DIAGNOSIS — M54.16 LUMBAR RADICULOPATHY: ICD-10-CM

## 2023-01-03 DIAGNOSIS — L68.0 FEMALE HIRSUTISM: ICD-10-CM

## 2023-01-03 DIAGNOSIS — R59.0 LYMPHADENOPATHY, AXILLARY: ICD-10-CM

## 2023-01-03 DIAGNOSIS — N64.52 NIPPLE DISCHARGE: ICD-10-CM

## 2023-01-03 PROCEDURE — 99214 OFFICE O/P EST MOD 30 MIN: CPT | Performed by: FAMILY MEDICINE

## 2023-01-03 RX ORDER — BUSPIRONE HYDROCHLORIDE 15 MG/1
15 TABLET ORAL 3 TIMES DAILY PRN
Qty: 90 TABLET | Refills: 2 | Status: SHIPPED | OUTPATIENT
Start: 2023-01-03

## 2023-01-03 RX ORDER — BUTALBITAL, ACETAMINOPHEN AND CAFFEINE 50; 325; 40 MG/1; MG/1; MG/1
1 TABLET ORAL EVERY 6 HOURS PRN
Qty: 30 TABLET | Refills: 3 | Status: SHIPPED | OUTPATIENT
Start: 2023-01-03

## 2023-01-03 RX ORDER — GABAPENTIN 300 MG/1
300 CAPSULE ORAL 3 TIMES DAILY
Qty: 90 CAPSULE | Refills: 2 | Status: SHIPPED | OUTPATIENT
Start: 2023-01-03 | End: 2023-01-13

## 2023-01-03 RX ORDER — CEPHALEXIN 500 MG/1
500 CAPSULE ORAL 4 TIMES DAILY
Qty: 40 CAPSULE | Refills: 0 | Status: SHIPPED | OUTPATIENT
Start: 2023-01-03

## 2023-01-03 ASSESSMENT — PATIENT HEALTH QUESTIONNAIRE - PHQ9
1. LITTLE INTEREST OR PLEASURE IN DOING THINGS: 1
SUM OF ALL RESPONSES TO PHQ QUESTIONS 1-9: 9
3. TROUBLE FALLING OR STAYING ASLEEP: 1
5. POOR APPETITE OR OVEREATING: 3
7. TROUBLE CONCENTRATING ON THINGS, SUCH AS READING THE NEWSPAPER OR WATCHING TELEVISION: 1
6. FEELING BAD ABOUT YOURSELF - OR THAT YOU ARE A FAILURE OR HAVE LET YOURSELF OR YOUR FAMILY DOWN: 0
9. THOUGHTS THAT YOU WOULD BE BETTER OFF DEAD, OR OF HURTING YOURSELF: 1
8. MOVING OR SPEAKING SO SLOWLY THAT OTHER PEOPLE COULD HAVE NOTICED. OR THE OPPOSITE, BEING SO FIGETY OR RESTLESS THAT YOU HAVE BEEN MOVING AROUND A LOT MORE THAN USUAL: 0
SUM OF ALL RESPONSES TO PHQ QUESTIONS 1-9: 8
SUM OF ALL RESPONSES TO PHQ QUESTIONS 1-9: 9
SUM OF ALL RESPONSES TO PHQ9 QUESTIONS 1 & 2: 2
SUM OF ALL RESPONSES TO PHQ QUESTIONS 1-9: 9
2. FEELING DOWN, DEPRESSED OR HOPELESS: 1
4. FEELING TIRED OR HAVING LITTLE ENERGY: 1
10. IF YOU CHECKED OFF ANY PROBLEMS, HOW DIFFICULT HAVE THESE PROBLEMS MADE IT FOR YOU TO DO YOUR WORK, TAKE CARE OF THINGS AT HOME, OR GET ALONG WITH OTHER PEOPLE: 1

## 2023-01-03 ASSESSMENT — COLUMBIA-SUICIDE SEVERITY RATING SCALE - C-SSRS
2. HAVE YOU ACTUALLY HAD ANY THOUGHTS OF KILLING YOURSELF?: YES
6. HAVE YOU EVER DONE ANYTHING, STARTED TO DO ANYTHING, OR PREPARED TO DO ANYTHING TO END YOUR LIFE?: NO
3. HAVE YOU BEEN THINKING ABOUT HOW YOU MIGHT KILL YOURSELF?: YES
4. HAVE YOU HAD THESE THOUGHTS AND HAD SOME INTENTION OF ACTING ON THEM?: YES
1. WITHIN THE PAST MONTH, HAVE YOU WISHED YOU WERE DEAD OR WISHED YOU COULD GO TO SLEEP AND NOT WAKE UP?: YES
5. HAVE YOU STARTED TO WORK OUT OR WORKED OUT THE DETAILS OF HOW TO KILL YOURSELF? DO YOU INTEND TO CARRY OUT THIS PLAN?: NO

## 2023-01-03 ASSESSMENT — ENCOUNTER SYMPTOMS
SHORTNESS OF BREATH: 0
COLOR CHANGE: 0
CHEST TIGHTNESS: 0
WHEEZING: 0
COUGH: 0

## 2023-01-03 NOTE — PROGRESS NOTES
ARNULFO [address]  [address] [address]  Dept: [phone]  Dept Fax: [phone]  Loc: [phone]    Shey Fuentes is a 42 y.o. female who presents today for her medical conditions/complaints as noted below. Shey Fuentes is c/o of   Chief Complaint   Patient presents with    Anxiety     3 month    Weight Management     Discuss weight loss    Breast Problem     Having discharge and tenderness with right axilla tender       HPI:     HPI Here today for a follow up of her anxiety and she would like to discuss weight loss and she is having breast pain. Breast pain: she has a hard lymph node in her right axilla and she has noticed that if she squeezes her nipples bilaterally she can get a milky discharge. She has noticed this over the past 2 weeks. Her left breast is itching frequently but there is no rash. No tenderness in the breasts. No chance of pregnancy, she is not currently sexually active. No increase in headaches. The injections she got in the trapezius the last time really helped. She is having a lot more anxiety and panic attacks because she is very overwhelmed by school and work and she is becoming afraid to leave her house. She also finding the medical problems she is having is making her anxiety worse because she worries that she has cancer. She is still taking her her vraylar regularly. She is also taking buspar and propranolol prn.         Past Medical History:   Diagnosis Date    Anxiety     Bipolar 1 disorder ([facility]     COPD (chronic obstructive pulmonary disease) ([facility])     Headache     Major depressive disorder     PTSD (post-traumatic stress disorder)     Substance abuse ([facility]     recovering alcoholic and drug [facility]-14 mo sober          Social History     Tobacco Use    Smoking status: Former     Packs/day: 0.25     Years: 25.00     Pack years: 6.25     Types: E-Cigarettes, Cigarettes     Start date: 1/1/1995     Quit date: 10/1/2019     Years since quitting: 3.2    Smokeless tobacco: Never   Substance Use Topics    Alcohol use: No     Comment: recovering alcholic quit june 28, 9447     Current Outpatient Medications   Medication Sig Dispense Refill    busPIRone (BUSPAR) 15 MG tablet Take 15 mg by mouth 3 times daily as needed (anxiety) 90 tablet 2    butalbital-acetaminophen-caffeine (FIORICET, ESGIC) -40 MG per tablet Take 1 tablet by mouth every 6 hours as needed for Headaches 30 tablet 3    cariprazine hcl (VRAYLAR) 3 MG CAPS capsule Take 1 capsule by mouth daily 30 capsule 3    gabapentin (NEURONTIN) 300 MG capsule Take 1 capsule by mouth 3 times daily for 30 doses. 90 capsule 2    cephALEXin (KEFLEX) 500 MG capsule Take 1 capsule by mouth 4 times daily 40 capsule 0    propranolol (INDERAL) 20 MG tablet Take 1 tablet by mouth 3 times daily 90 tablet 1    fluticasone-umeclidin-vilant (TRELEGY ELLIPTA) 100-62.5-25 MCG/INH AEPB Inhale 1 puff into the lungs daily 30 each 5    nicotine (NICODERM CQ) 14 MG/24HR Place 1 patch onto the skin every 24 hours (Patient not taking: Reported on 1/3/2023) 42 patch 0     No current facility-administered medications for this visit. Allergies   Allergen Reactions    Latex      Powder in rubber gloves      Dust Mite Extract     Other      Pet dander    Pcn [Penicillins] Hives     Including Amoxicillin    Pollen Extract     Clindamycin/Lincomycin Nausea And Vomiting       Subjective:     Review of Systems   Constitutional:  Negative for activity change, appetite change, chills, fatigue and fever. Eyes:  Negative for visual disturbance. Respiratory:  Negative for cough, chest tightness, shortness of breath and wheezing. Cardiovascular:  Negative for chest pain, palpitations and leg swelling. Genitourinary:  Negative for difficulty urinating. Nipple discharge with expression   Skin:  Negative for color change and wound.    Neurological: Positive for headaches. Negative for dizziness, syncope, weakness and light-headedness. Hematological:  Positive for adenopathy (in right axilla). Psychiatric/Behavioral:  Positive for decreased concentration and dysphoric mood. Negative for confusion and sleep disturbance. The patient is nervous/anxious. Objective:      Physical Exam  Vitals and nursing note reviewed. Constitutional:       General: She is not in acute distress. Appearance: She is well-developed. Eyes:      Conjunctiva/sclera: Conjunctivae normal.   Neck:      Thyroid: No thyromegaly. Cardiovascular:      Rate and Rhythm: Regular rhythm. Heart sounds: Normal heart sounds. No murmur heard. Pulmonary:      Effort: Pulmonary effort is normal. No respiratory distress. Breath sounds: Normal breath sounds. No wheezing. Chest:   Breasts:     Right: Nipple discharge (only when trying to express milk) present. No swelling, bleeding, inverted nipple, mass, skin change or tenderness. Left: Nipple discharge (only when trying to express milk) present. No swelling, bleeding, inverted nipple, mass, skin change or tenderness. Musculoskeletal:      Cervical back: Normal range of motion and neck supple. Lymphadenopathy:      Cervical: No cervical adenopathy. Upper Body:      Right upper body: Axillary adenopathy present. Left upper body: No axillary adenopathy. Skin:     General: Skin is warm and dry. Findings: No erythema or rash. Neurological:      Mental Status: She is alert and oriented to person, place, and time. Psychiatric:         Mood and Affect: Mood is anxious. Behavior: Behavior normal.         Thought Content: Thought content normal.         Judgment: Judgment normal.     /68   Pulse 63   Temp 97.3 °F (36.3 °C)   Ht 5' 5\" (1.651 m)   Wt 213 lb (96.6 kg)   SpO2 98%   BMI 35.45 kg/m²     Assessment:       Diagnosis Orders   1.  Bipolar 1 disorder (HCC)  cariprazine hcl (VRAYLAR) 3 MG CAPS capsule      2. Lumbar radiculopathy  gabapentin (NEURONTIN) 300 MG capsule      3. Female hirsutism  TSH With Reflex Ft4    Testosterone    Progesterone    Estrogens, Fractionated    Comprehensive Metabolic Panel    CBC with Auto Differential      4. Nipple discharge  Prolactin    CBC with Auto Differential      5. Lymphadenopathy, axillary  cephALEXin (KEFLEX) 500 MG capsule                Plan:       Bipolar disorder: worsening; she has been a lot more anxious recently. I increased her buspar to 15mg tid. Her vraylar is at the max dose. I also advised her to take her propranolol    Back pain: stable; she is doing well on the gabapentin so I will continue it. Hirsutism: new; she has noticed increased hair on her chin so I will check labs to see if there is a metabolic cause. Nipple discharge: new; seems to be just to trauma (only happens when she squeezes the nipple. I will check a prolactin level though    Lymphadenopathy in right axilla: new; seems to be due to an infected hair follicle. I will treat with keflex. Return in about 1 month (around 2/3/2023) for Anxiety follow up.     Orders Placed This Encounter   Procedures    Prolactin     Standing Status:   Future     Standing Expiration Date:   1/3/2024    TSH With Reflex Ft4     Standing Status:   Future     Standing Expiration Date:   1/3/2024    Testosterone     Standing Status:   Future     Standing Expiration Date:   1/3/2024    Progesterone     Standing Status:   Future     Standing Expiration Date:   1/3/2024    Estrogens, Fractionated     Standing Status:   Future     Standing Expiration Date:   1/3/2024    Comprehensive Metabolic Panel     Standing Status:   Future     Standing Expiration Date:   1/3/2024    CBC with Auto Differential     Standing Status:   Future     Standing Expiration Date:   1/3/2024     Orders Placed This Encounter   Medications    busPIRone (BUSPAR) 15 MG tablet     Sig: Take 15 mg by mouth 3 times daily as needed (anxiety)     Dispense:  90 tablet     Refill:  2    butalbital-acetaminophen-caffeine (FIORICET, ESGIC) -40 MG per tablet     Sig: Take 1 tablet by mouth every 6 hours as needed for Headaches     Dispense:  30 tablet     Refill:  3    cariprazine hcl (VRAYLAR) 3 MG CAPS capsule     Sig: Take 1 capsule by mouth daily     Dispense:  30 capsule     Refill:  3    gabapentin (NEURONTIN) 300 MG capsule     Sig: Take 1 capsule by mouth 3 times daily for 30 doses. Dispense:  90 capsule     Refill:  2    cephALEXin (KEFLEX) 500 MG capsule     Sig: Take 1 capsule by mouth 4 times daily     Dispense:  40 capsule     Refill:  0       Patientgiven educational materials - see patient instructions. Discussed use, benefit,and side effects of prescribed medications. All patient questions answered. Ptvoiced understanding. Reviewed health maintenance. Instructed to continue currentmedications, diet and exercise. Patient agreed with treatment plan. Follow up asdirected.      Electronically signed by Rafi Stevens MD on 1/3/2023 at 12:46 PM

## 2023-01-03 NOTE — LETTER
[facility] [address] [address]  Phone: [phone]  Fax: [phone]    Cynthia Geiger MD        January 3, 2023     Patient: Louisa Claire   YOB: 1985   Date of Visit: 1/3/2023       To Whom It May Concern: It is my medical opinion that Louisa Claire may return to work on 1/3/23. She was late due to her appointment today    If you have any questions or concerns, please don't hesitate to call.     Sincerely,        Cynthia Geiger MD

## 2023-01-03 NOTE — LETTER
[facility] [address] [address]  Phone: [phone]  Fax: [phone]    Frankey Delaware, MD        January 3, 2023     Patient: Choco River   YOB: 1985   Date of Visit: 1/3/2023       To Whom it May Concern:    Choco River was seen in my clinic on 1/3/2023. If you have any questions or concerns, please don't hesitate to call.     Sincerely,         Frankey Delaware, MD

## 2023-01-11 ENCOUNTER — TELEPHONE (OUTPATIENT)
Dept: FAMILY MEDICINE CLINIC | Age: 38
End: 2023-01-11

## 2023-01-11 DIAGNOSIS — F31.9 BIPOLAR 1 DISORDER (HCC): ICD-10-CM

## 2023-01-11 NOTE — TELEPHONE ENCOUNTER
This was sent to local pharmacy, [facility] 1/3/2023. Attempted to reach out to pt, mailbox is full.

## 2023-01-11 NOTE — TELEPHONE ENCOUNTER
Armaan Divers of [facility] [phone] RX request vraylar, eligible ended on 12-09-22 can not fill order

## 2023-01-24 ENCOUNTER — TELEPHONE (OUTPATIENT)
Dept: FAMILY MEDICINE CLINIC | Age: 38
End: 2023-01-24

## 2023-01-24 NOTE — TELEPHONE ENCOUNTER
Felipe Moreland stating pt's Adama Benito has been denied per insurance. Also, per [employer], pt's insurance  on 1/15/2023. Out of pocket  will be $1600.00    Left message for pt to return call regarding insurance. If pt has new insurance that needs to be updated?

## 2023-02-22 ENCOUNTER — TELEPHONE (OUTPATIENT)
Dept: FAMILY MEDICINE CLINIC | Age: 38
End: 2023-02-22

## 2023-03-28 ENCOUNTER — OFFICE VISIT (OUTPATIENT)
Dept: FAMILY MEDICINE CLINIC | Age: 38
End: 2023-03-28
Payer: COMMERCIAL

## 2023-03-28 ENCOUNTER — HOSPITAL ENCOUNTER (OUTPATIENT)
Age: 38
Discharge: HOME | End: 2023-03-28
Payer: COMMERCIAL

## 2023-03-28 VITALS
HEART RATE: 57 BPM | HEIGHT: 65 IN | BODY MASS INDEX: 35.32 KG/M2 | WEIGHT: 212 LBS | OXYGEN SATURATION: 97 % | SYSTOLIC BLOOD PRESSURE: 100 MMHG | TEMPERATURE: 97.5 F | DIASTOLIC BLOOD PRESSURE: 60 MMHG

## 2023-03-28 DIAGNOSIS — L68.0 FEMALE HIRSUTISM: ICD-10-CM

## 2023-03-28 DIAGNOSIS — N64.52 NIPPLE DISCHARGE: ICD-10-CM

## 2023-03-28 DIAGNOSIS — F19.11 SUBSTANCE ABUSE IN REMISSION (HCC): ICD-10-CM

## 2023-03-28 DIAGNOSIS — F31.9 BIPOLAR 1 DISORDER (HCC): Primary | ICD-10-CM

## 2023-03-28 DIAGNOSIS — J41.8 MIXED SIMPLE AND MUCOPURULENT CHRONIC BRONCHITIS (HCC): ICD-10-CM

## 2023-03-28 DIAGNOSIS — M62.838 TRAPEZIUS MUSCLE SPASM: ICD-10-CM

## 2023-03-28 LAB
ABSOLUTE EOS #: 0.33 K/UL (ref 0–0.44)
ABSOLUTE IMMATURE GRANULOCYTE: <0.03 K/UL (ref 0–0.3)
ABSOLUTE LYMPH #: 2.76 K/UL (ref 1.1–3.7)
ABSOLUTE MONO #: 0.4 K/UL (ref 0.1–1.2)
ALBUMIN SERPL-MCNC: 4.2 G/DL (ref 3.5–5.2)
ALBUMIN/GLOBULIN RATIO: 1.2 (ref 1–2.5)
ALP SERPL-CCNC: 131 U/L (ref 35–104)
ALT SERPL-CCNC: 66 U/L (ref 5–33)
ANION GAP SERPL CALCULATED.3IONS-SCNC: 8 MMOL/L (ref 9–17)
AST SERPL-CCNC: 18 U/L
BASOPHILS # BLD: 1 % (ref 0–2)
BASOPHILS ABSOLUTE: 0.06 K/UL (ref 0–0.2)
BILIRUB SERPL-MCNC: 0.2 MG/DL (ref 0.3–1.2)
BUN SERPL-MCNC: 12 MG/DL (ref 6–20)
BUN/CREAT BLD: 17 (ref 9–20)
CALCIUM SERPL-MCNC: 9.2 MG/DL (ref 8.6–10.4)
CHLORIDE SERPL-SCNC: 104 MMOL/L (ref 98–107)
CO2 SERPL-SCNC: 28 MMOL/L (ref 20–31)
CREAT SERPL-MCNC: 0.72 MG/DL (ref 0.5–0.9)
EOSINOPHILS RELATIVE PERCENT: 4 % (ref 1–4)
GFR SERPL CREATININE-BSD FRML MDRD: >60 ML/MIN/1.73M2
GLUCOSE SERPL-MCNC: 87 MG/DL (ref 70–99)
HCT VFR BLD AUTO: 40.8 % (ref 36.3–47.1)
HGB BLD-MCNC: 13.3 G/DL (ref 11.9–15.1)
IMMATURE GRANULOCYTES: 0 %
LYMPHOCYTES # BLD: 35 % (ref 24–43)
MCH RBC QN AUTO: 30 PG (ref 25.2–33.5)
MCHC RBC AUTO-ENTMCNC: 32.6 G/DL (ref 25.2–33.5)
MCV RBC AUTO: 91.9 FL (ref 82.6–102.9)
MONOCYTES # BLD: 5 % (ref 3–12)
NRBC AUTOMATED: 0 PER 100 WBC
PDW BLD-RTO: 14.2 % (ref 11.8–14.4)
PLATELET # BLD AUTO: 311 K/UL (ref 138–453)
PMV BLD AUTO: 9.1 FL (ref 8.1–13.5)
POTASSIUM SERPL-SCNC: 4.1 MMOL/L (ref 3.7–5.3)
PROT SERPL-MCNC: 7.7 G/DL (ref 6.4–8.3)
RBC # BLD: 4.44 M/UL (ref 3.95–5.11)
SEG NEUTROPHILS: 55 % (ref 36–65)
SEGMENTED NEUTROPHILS ABSOLUTE COUNT: 4.37 K/UL (ref 1.5–8.1)
SODIUM SERPL-SCNC: 140 MMOL/L (ref 135–144)
TSH SERPL-ACNC: 2.95 UIU/ML (ref 0.3–5)
WBC # BLD AUTO: 7.9 K/UL (ref 3.5–11.3)

## 2023-03-28 PROCEDURE — 99214 OFFICE O/P EST MOD 30 MIN: CPT | Performed by: FAMILY MEDICINE

## 2023-03-28 PROCEDURE — 82671 ASSAY OF ESTROGENS: CPT

## 2023-03-28 PROCEDURE — 84403 ASSAY OF TOTAL TESTOSTERONE: CPT

## 2023-03-28 PROCEDURE — 84443 ASSAY THYROID STIM HORMONE: CPT

## 2023-03-28 PROCEDURE — 80053 COMPREHEN METABOLIC PANEL: CPT

## 2023-03-28 PROCEDURE — 36415 COLL VENOUS BLD VENIPUNCTURE: CPT

## 2023-03-28 PROCEDURE — 84146 ASSAY OF PROLACTIN: CPT

## 2023-03-28 PROCEDURE — 20552 NJX 1/MLT TRIGGER POINT 1/2: CPT | Performed by: FAMILY MEDICINE

## 2023-03-28 PROCEDURE — 85025 COMPLETE CBC W/AUTO DIFF WBC: CPT

## 2023-03-28 PROCEDURE — 84144 ASSAY OF PROGESTERONE: CPT

## 2023-03-28 RX ORDER — CYCLOBENZAPRINE HCL 5 MG
5 TABLET ORAL NIGHTLY PRN
Qty: 30 TABLET | Refills: 1 | Status: SHIPPED | OUTPATIENT
Start: 2023-03-28 | End: 2023-04-07

## 2023-03-28 RX ORDER — TRIAMCINOLONE ACETONIDE 40 MG/ML
20 INJECTION, SUSPENSION INTRA-ARTICULAR; INTRAMUSCULAR ONCE
Status: COMPLETED | OUTPATIENT
Start: 2023-03-28 | End: 2023-03-28

## 2023-03-28 RX ORDER — DOXEPIN HYDROCHLORIDE 10 MG/1
10 CAPSULE ORAL NIGHTLY
Qty: 30 CAPSULE | Refills: 3 | Status: SHIPPED | OUTPATIENT
Start: 2023-03-28

## 2023-03-28 RX ADMIN — TRIAMCINOLONE ACETONIDE 20 MG: 40 INJECTION, SUSPENSION INTRA-ARTICULAR; INTRAMUSCULAR at 16:56

## 2023-03-28 SDOH — ECONOMIC STABILITY: HOUSING INSECURITY
IN THE LAST 12 MONTHS, WAS THERE A TIME WHEN YOU DID NOT HAVE A STEADY PLACE TO SLEEP OR SLEPT IN A SHELTER (INCLUDING NOW)?: PATIENT REFUSED

## 2023-03-28 SDOH — ECONOMIC STABILITY: FOOD INSECURITY: WITHIN THE PAST 12 MONTHS, THE FOOD YOU BOUGHT JUST DIDN'T LAST AND YOU DIDN'T HAVE MONEY TO GET MORE.: PATIENT DECLINED

## 2023-03-28 SDOH — ECONOMIC STABILITY: INCOME INSECURITY: HOW HARD IS IT FOR YOU TO PAY FOR THE VERY BASICS LIKE FOOD, HOUSING, MEDICAL CARE, AND HEATING?: PATIENT DECLINED

## 2023-03-28 SDOH — ECONOMIC STABILITY: FOOD INSECURITY: WITHIN THE PAST 12 MONTHS, YOU WORRIED THAT YOUR FOOD WOULD RUN OUT BEFORE YOU GOT MONEY TO BUY MORE.: PATIENT DECLINED

## 2023-03-28 ASSESSMENT — ENCOUNTER SYMPTOMS
CHEST TIGHTNESS: 0
WHEEZING: 0
SHORTNESS OF BREATH: 0
COUGH: 0

## 2023-03-28 NOTE — PROGRESS NOTES
tablet by mouth nightly as needed for Muscle spasms     Dispense:  30 tablet     Refill:  1         Patientgiven educational materials - see patient instructions. Discussed use, benefit,and side effects of prescribed medications. All patient questions answered. Ptvoiced understanding. Reviewed health maintenance. Instructed to continue currentmedications, diet and exercise. Patient agreed with treatment plan. Follow up asdirected.      Electronically signed by Abigail Smith MD on 3/28/2023 at 9:55 PM

## 2023-03-29 LAB
PROGEST SERPL-MCNC: 0.11 NG/ML
PROLACTIN: 17.01 NG/ML (ref 4.79–23.3)
TESTOST SERPL-MCNC: 28 NG/DL (ref 20–70)

## 2023-04-01 LAB
ESTRADIOL LEVEL: 7 PG/ML
ESTROGEN TOTAL: 33.1 PG/ML
ESTRONE: 26.1 PG/ML

## 2023-04-06 DIAGNOSIS — M54.16 LUMBAR RADICULOPATHY: ICD-10-CM

## 2023-04-06 RX ORDER — GABAPENTIN 300 MG/1
300 CAPSULE ORAL 3 TIMES DAILY
Qty: 90 CAPSULE | Refills: 2 | Status: SHIPPED | OUTPATIENT
Start: 2023-04-08 | End: 2023-04-18

## 2023-04-06 RX ORDER — BUSPIRONE HYDROCHLORIDE 15 MG/1
15 TABLET ORAL 3 TIMES DAILY PRN
Qty: 90 TABLET | Refills: 2 | Status: SHIPPED | OUTPATIENT
Start: 2023-04-06

## 2023-04-06 NOTE — TELEPHONE ENCOUNTER
Per [facility], last filled 3/9/2023, #90/30 days      Zain Maldonado called requesting a refill of the below medication which has been pended for you:     Requested Prescriptions     Pending Prescriptions Disp Refills    busPIRone (BUSPAR) 15 MG tablet 90 tablet 2     Sig: Take 15 mg by mouth 3 times daily as needed (anxiety)    gabapentin (NEURONTIN) 300 MG capsule 90 capsule 0     Sig: Take 1 capsule by mouth 3 times daily for 30 doses.        Last Appointment Date: 3/28/2023  Next Appointment Date: 7/3/2023    Allergies   Allergen Reactions    Latex      Powder in rubber gloves      Dust Mite Extract     Other      Pet dander    Pcn [Penicillins] Hives     Including Amoxicillin    Pollen Extract     Clindamycin/Lincomycin Nausea And Vomiting

## 2023-04-07 DIAGNOSIS — G44.229 CHRONIC TENSION-TYPE HEADACHE, NOT INTRACTABLE: Primary | ICD-10-CM

## 2023-04-07 RX ORDER — BUTALBITAL, ACETAMINOPHEN AND CAFFEINE 50; 325; 40 MG/1; MG/1; MG/1
TABLET ORAL
Qty: 30 TABLET | Refills: 0 | Status: SHIPPED | OUTPATIENT
Start: 2023-04-11

## 2023-04-07 NOTE — TELEPHONE ENCOUNTER
Per [facility], last filled 3/11/2023, #30/8 days    Pt due for CSA.        Luetta Pigeon called requesting a refill of the below medication which has been pended for you:     Requested Prescriptions     Pending Prescriptions Disp Refills    butalbital-acetaminophen-caffeine (FIORICET, ESGIC) -40 MG per tablet [Pharmacy Med Name: Butalbital-APAP-Caffeine -40 MG Oral Tablet] 30 tablet 0     Sig: TAKE 1 TABLET BY MOUTH EVERY 6 HOURS AS NEEDED FOR HEADACHE       Last Appointment Date: 3/28/2023  Next Appointment Date: 7/3/2023    Allergies   Allergen Reactions    Latex      Powder in rubber gloves      Dust Mite Extract     Other      Pet dander    Pcn [Penicillins] Hives     Including Amoxicillin    Pollen Extract     Clindamycin/Lincomycin Nausea And Vomiting

## 2023-05-25 ENCOUNTER — HOSPITAL ENCOUNTER (OUTPATIENT)
Age: 38
Setting detail: SPECIMEN
Discharge: HOME | End: 2023-05-25
Payer: COMMERCIAL

## 2023-05-25 ENCOUNTER — HOSPITAL ENCOUNTER (OUTPATIENT)
Dept: GENERAL RADIOLOGY | Age: 38
Discharge: HOME | End: 2023-05-27
Payer: COMMERCIAL

## 2023-05-25 ENCOUNTER — OFFICE VISIT (OUTPATIENT)
Dept: PRIMARY CARE CLINIC | Age: 38
End: 2023-05-25
Payer: COMMERCIAL

## 2023-05-25 VITALS
HEIGHT: 65 IN | OXYGEN SATURATION: 99 % | TEMPERATURE: 98.5 F | SYSTOLIC BLOOD PRESSURE: 100 MMHG | WEIGHT: 205 LBS | HEART RATE: 56 BPM | DIASTOLIC BLOOD PRESSURE: 60 MMHG | BODY MASS INDEX: 34.16 KG/M2

## 2023-05-25 DIAGNOSIS — R42 DIZZINESS: Primary | ICD-10-CM

## 2023-05-25 DIAGNOSIS — R06.00 DYSPNEA, UNSPECIFIED TYPE: ICD-10-CM

## 2023-05-25 DIAGNOSIS — R00.1 BRADYCARDIA: ICD-10-CM

## 2023-05-25 DIAGNOSIS — R42 DIZZINESS: ICD-10-CM

## 2023-05-25 LAB
ALBUMIN SERPL-MCNC: 4 G/DL (ref 3.5–5.2)
ALBUMIN/GLOB SERPL: 1.3 {RATIO} (ref 1–2.5)
ALP SERPL-CCNC: 126 U/L (ref 35–104)
ALT SERPL-CCNC: 46 U/L (ref 5–33)
ANION GAP SERPL CALCULATED.3IONS-SCNC: 11 MMOL/L (ref 9–17)
AST SERPL-CCNC: 24 U/L
BASOPHILS # BLD: 0.05 K/UL (ref 0–0.2)
BASOPHILS NFR BLD: 1 % (ref 0–2)
BILIRUB SERPL-MCNC: 0.3 MG/DL (ref 0.3–1.2)
BUN SERPL-MCNC: 11 MG/DL (ref 6–20)
BUN/CREAT SERPL: 14 (ref 9–20)
CALCIUM SERPL-MCNC: 9.1 MG/DL (ref 8.6–10.4)
CHLORIDE SERPL-SCNC: 106 MMOL/L (ref 98–107)
CO2 SERPL-SCNC: 24 MMOL/L (ref 20–31)
CREAT SERPL-MCNC: 0.77 MG/DL (ref 0.5–0.9)
D DIMER PPP FEU-MCNC: 0.3 UG/ML FEU (ref 0–0.59)
EOSINOPHIL # BLD: 0.12 K/UL (ref 0–0.44)
EOSINOPHILS RELATIVE PERCENT: 2 % (ref 1–4)
ERYTHROCYTE [DISTWIDTH] IN BLOOD BY AUTOMATED COUNT: 14.1 % (ref 11.8–14.4)
GFR SERPL CREATININE-BSD FRML MDRD: >60 ML/MIN/1.73M2
GLUCOSE SERPL-MCNC: 96 MG/DL (ref 70–99)
HCG SERPL QL: NEGATIVE
HCT VFR BLD AUTO: 38.5 % (ref 36.3–47.1)
HGB BLD-MCNC: 13 G/DL (ref 11.9–15.1)
IMM GRANULOCYTES # BLD AUTO: 0.03 K/UL (ref 0–0.3)
IMM GRANULOCYTES NFR BLD: 0 %
LYMPHOCYTES # BLD: 25 % (ref 24–43)
LYMPHOCYTES NFR BLD: 1.92 K/UL (ref 1.1–3.7)
MCH RBC QN AUTO: 30.6 PG (ref 25.2–33.5)
MCHC RBC AUTO-ENTMCNC: 33.8 G/DL (ref 25.2–33.5)
MCV RBC AUTO: 90.6 FL (ref 82.6–102.9)
MONOCYTES NFR BLD: 0.58 K/UL (ref 0.1–1.2)
MONOCYTES NFR BLD: 8 % (ref 3–12)
NEUTROPHILS NFR BLD: 64 % (ref 36–65)
NEUTS SEG NFR BLD: 4.99 K/UL (ref 1.5–8.1)
NRBC AUTOMATED: 0 PER 100 WBC
PLATELET # BLD AUTO: 267 K/UL (ref 138–453)
PMV BLD AUTO: 9.4 FL (ref 8.1–13.5)
POTASSIUM SERPL-SCNC: 4 MMOL/L (ref 3.7–5.3)
PROT SERPL-MCNC: 7.1 G/DL (ref 6.4–8.3)
RBC # BLD AUTO: 4.25 M/UL (ref 3.95–5.11)
SODIUM SERPL-SCNC: 141 MMOL/L (ref 135–144)
T4 FREE SERPL-MCNC: 1.2 NG/DL (ref 0.9–1.7)
TSH SERPL-MCNC: 1.34 UIU/ML (ref 0.3–5)
WBC OTHER # BLD: 7.7 K/UL (ref 3.5–11.3)

## 2023-05-25 PROCEDURE — 36415 COLL VENOUS BLD VENIPUNCTURE: CPT

## 2023-05-25 PROCEDURE — 93000 ELECTROCARDIOGRAM COMPLETE: CPT | Performed by: FAMILY MEDICINE

## 2023-05-25 PROCEDURE — 80053 COMPREHEN METABOLIC PANEL: CPT

## 2023-05-25 PROCEDURE — 85025 COMPLETE CBC W/AUTO DIFF WBC: CPT

## 2023-05-25 PROCEDURE — 84443 ASSAY THYROID STIM HORMONE: CPT

## 2023-05-25 PROCEDURE — 85379 FIBRIN DEGRADATION QUANT: CPT

## 2023-05-25 PROCEDURE — 71046 X-RAY EXAM CHEST 2 VIEWS: CPT

## 2023-05-25 PROCEDURE — 99214 OFFICE O/P EST MOD 30 MIN: CPT | Performed by: FAMILY MEDICINE

## 2023-05-25 PROCEDURE — 84703 CHORIONIC GONADOTROPIN ASSAY: CPT

## 2023-05-25 PROCEDURE — 84439 ASSAY OF FREE THYROXINE: CPT

## 2023-05-25 RX ORDER — ALBUTEROL SULFATE 90 UG/1
2 AEROSOL, METERED RESPIRATORY (INHALATION) EVERY 4 HOURS PRN
Qty: 18 G | Refills: 0 | Status: SHIPPED | OUTPATIENT
Start: 2023-05-25

## 2023-05-25 NOTE — PROGRESS NOTES
[facility] [address], [place], [address]                        Telephone [phone]             Fax [phone]       Stephan Arana  :  1985  Age:  42 y.o. MRN:  [id number]  Date of visit:  2023       Assessment & Plan:    1. Dizziness  2. Bradycardia  I reviewed the results of the testing done today with the patient. A Holter monitor and cardiology referral were ordered:  - Holter Monitor 48 Hour; Future  - Ambulatory referral to Cardiology    She was advised not to take Propranolol. She was given a letter for work. I recommended that she remain off work until she has a follow up appointment with her PCP next week. 3. Dyspnea, unspecified type  I reviewed the results of the testing done today with the patient. I reviewed the chest x-ray images with the patient. I advised her that I did not see any abnormalities on the x-ray. She will be contacted when the radiologist's interpretation of her x-rays is available. She was advised to continue Trelegy. Albuterol was also ordered:  - albuterol sulfate HFA (PROVENTIL;VENTOLIN;PROAIR) 108 (90 Base) MCG/ACT inhaler; Inhale 2 puffs into the lungs every 4 hours as needed for Wheezing or Shortness of Breath  Dispense: 18 g; Refill: 0    She was advised to follow up if symptoms worsen or do not resolve. Subjective:    Stephan Arana is a 42 y.o. female who presents to [place] [address] today (2023) for evaluation of:  Dizziness (Pt c/o of dizziness, headache, throbbing pain on right side of head, SOB, x 1 week, she states her lungs feel full and heavy but she can't bring any mucous up)      She reports intermittent dizziness that is worse today. She denies palpitations. She also states that she has had shortness of breath for a week. She has been using Trelegy, but she continues to have shortness of breath. She denies fever.

## 2023-06-01 ENCOUNTER — OFFICE VISIT (OUTPATIENT)
Dept: FAMILY MEDICINE CLINIC | Age: 38
End: 2023-06-01
Payer: COMMERCIAL

## 2023-06-01 VITALS
BODY MASS INDEX: 33.32 KG/M2 | DIASTOLIC BLOOD PRESSURE: 68 MMHG | HEART RATE: 51 BPM | HEIGHT: 65 IN | SYSTOLIC BLOOD PRESSURE: 108 MMHG | OXYGEN SATURATION: 99 % | WEIGHT: 200 LBS

## 2023-06-01 DIAGNOSIS — R42 DIZZINESS: Primary | ICD-10-CM

## 2023-06-01 DIAGNOSIS — R00.1 BRADYCARDIA: ICD-10-CM

## 2023-06-01 DIAGNOSIS — J41.8 MIXED SIMPLE AND MUCOPURULENT CHRONIC BRONCHITIS (HCC): ICD-10-CM

## 2023-06-01 PROCEDURE — 99214 OFFICE O/P EST MOD 30 MIN: CPT | Performed by: NURSE PRACTITIONER

## 2023-06-01 NOTE — PROGRESS NOTES
Subjective:      Patient ID: Ernestine Hayward is a 42 y.o. female coming in for   Chief Complaint   Patient presents with    Dizziness     Urgent Care follow up. Holter scheduled for the 12th. Dizziness comes and goes. Hasn't been very active. HPI  Urgent Care follow up. Pt continues to have issues with consistent shortness of breath, lightheadedness, dizziness and palpitations. Holter 6/12/23, 6/16/23 appt with cardiology  Shortness of breath, worse with any activity  Hx of COPD, no alpha-1 antitrypsin test done. Does use trelegy, but only prn due to taste. Does not have albuterol due to cost.   Recent work up in  included negative d-dimer. Negative chest xray, EKG showed some sinus isauro. Pt wanting to return to work tomorrow. Review of Systems     Objective:/68   Pulse 51   Ht 5' 5\" (1.651 m)   Wt 200 lb (90.7 kg)   LMP 04/14/2023 (Approximate)   SpO2 99%   BMI 33.28 kg/m²      Physical Exam  Vitals and nursing note reviewed. Constitutional:       General: She is not in acute distress. Appearance: Normal appearance. She is not ill-appearing. HENT:      Head: Normocephalic. Cardiovascular:      Rate and Rhythm: Regular rhythm. Bradycardia present. Heart sounds: Normal heart sounds. Pulmonary:      Effort: Pulmonary effort is normal. No respiratory distress. Breath sounds: Normal breath sounds. No stridor. No wheezing, rhonchi or rales. Musculoskeletal:      Cervical back: Neck supple. Skin:     General: Skin is warm and dry. Findings: No rash. Neurological:      General: No focal deficit present. Mental Status: She is alert and oriented to person, place, and time. Assessment:      1. Dizziness    2. Bradycardia    3. Mixed simple and mucopurulent chronic bronchitis ([address]           Plan:    -continue with current medication regimen  -keep scheduled appts  -checking alpha 1 antitrypsin  -work note provided to return to work.  Pt advised to make sure

## 2023-06-27 ENCOUNTER — HOSPITAL ENCOUNTER (OUTPATIENT)
Dept: NON INVASIVE DIAGNOSTICS | Age: 38
Discharge: HOME | End: 2023-06-27
Attending: INTERNAL MEDICINE
Payer: COMMERCIAL

## 2023-06-27 ENCOUNTER — TELEPHONE (OUTPATIENT)
Dept: CARDIOLOGY | Age: 38
End: 2023-06-27

## 2023-06-27 DIAGNOSIS — R07.9 CHEST PAIN, UNSPECIFIED TYPE: ICD-10-CM

## 2023-06-27 LAB
LV EF: 65 %
LVEF MODALITY: NORMAL

## 2023-06-27 PROCEDURE — 93306 TTE W/DOPPLER COMPLETE: CPT

## 2023-06-27 PROCEDURE — 93017 CV STRESS TEST TRACING ONLY: CPT

## 2023-07-03 ENCOUNTER — OFFICE VISIT (OUTPATIENT)
Dept: FAMILY MEDICINE CLINIC | Age: 38
End: 2023-07-03
Payer: COMMERCIAL

## 2023-07-03 VITALS
HEIGHT: 65 IN | BODY MASS INDEX: 32.65 KG/M2 | DIASTOLIC BLOOD PRESSURE: 68 MMHG | WEIGHT: 196 LBS | OXYGEN SATURATION: 98 % | SYSTOLIC BLOOD PRESSURE: 120 MMHG | HEART RATE: 51 BPM

## 2023-07-03 DIAGNOSIS — G47.10 HYPERSOMNIA: ICD-10-CM

## 2023-07-03 DIAGNOSIS — F31.9 BIPOLAR 1 DISORDER (HCC): Primary | ICD-10-CM

## 2023-07-03 DIAGNOSIS — G44.229 CHRONIC TENSION-TYPE HEADACHE, NOT INTRACTABLE: ICD-10-CM

## 2023-07-03 DIAGNOSIS — M54.16 LUMBAR RADICULOPATHY: ICD-10-CM

## 2023-07-03 DIAGNOSIS — B07.0 PLANTAR WART: ICD-10-CM

## 2023-07-03 PROCEDURE — 99214 OFFICE O/P EST MOD 30 MIN: CPT | Performed by: FAMILY MEDICINE

## 2023-07-03 PROCEDURE — 17110 DESTRUCTION B9 LES UP TO 14: CPT | Performed by: FAMILY MEDICINE

## 2023-07-03 RX ORDER — GABAPENTIN 300 MG/1
300 CAPSULE ORAL 3 TIMES DAILY
Qty: 90 CAPSULE | Refills: 2 | Status: SHIPPED | OUTPATIENT
Start: 2023-07-03 | End: 2023-10-01

## 2023-07-03 RX ORDER — ARIPIPRAZOLE 5 MG/1
5 TABLET ORAL DAILY
Qty: 30 TABLET | Refills: 3 | Status: SHIPPED | OUTPATIENT
Start: 2023-07-03

## 2023-07-03 RX ORDER — BUSPIRONE HYDROCHLORIDE 15 MG/1
15 TABLET ORAL 3 TIMES DAILY PRN
Qty: 90 TABLET | Refills: 2 | Status: SHIPPED | OUTPATIENT
Start: 2023-07-03

## 2023-07-03 RX ORDER — ALBUTEROL SULFATE 90 UG/1
2 AEROSOL, METERED RESPIRATORY (INHALATION) EVERY 4 HOURS PRN
Qty: 18 G | Refills: 0 | Status: SHIPPED | OUTPATIENT
Start: 2023-07-03

## 2023-07-03 RX ORDER — BUTALBITAL, ACETAMINOPHEN AND CAFFEINE 50; 325; 40 MG/1; MG/1; MG/1
1 TABLET ORAL EVERY 6 HOURS PRN
Qty: 30 TABLET | Refills: 3 | Status: SHIPPED | OUTPATIENT
Start: 2023-07-03

## 2023-07-03 ASSESSMENT — ENCOUNTER SYMPTOMS
WHEEZING: 0
COUGH: 0
SHORTNESS OF BREATH: 0
CHEST TIGHTNESS: 0

## 2023-07-03 NOTE — PROGRESS NOTES
[address]  [address] [facility] [address]  Dept: [phone]  Dept Fax: [phone]  Loc: [phone]    Clinton Rendon is a 42 y.o. female who presents today for her medical conditions/complaints as noted below. Clinton Rendon is c/o of   Chief Complaint   Patient presents with    Anxiety     3 month-vraylar too expensive    Other     Tired and fatigue and sleepy. Discussed checking for KAYA. , per Dr Glenn Dodd 6/14/23       HPI:     HPI Here today for a follow up of her anxiety and she has been very tired. Anxiety: worsening; She has been off of her vraylar for over a month because of cost. She was on effexor before and liked it but didn't like getting off of it. She didn't like Seroquel in the past. She has not tried Risperdal or abilify. She is still using the buspar with good results. She is sleeping okay right now despite not being on the vraylar. She still is overall anxious and on edge. No feelings of jose de jesus recently. She had a low heart rate and was feeling dizzy so she ended up seeing cardio and she had a stress test and echo that were normal. Cardio suggested a sleep study. She is chronically tired and she is frequently yawing. She does not know that she snores. She does not wake up gasping or choking but she does wake up frequently. She tries to nap during the day. Her mom had narcolepsy. [place] Sleepiness Scale    Sitting and reading 3  Watching tv 1  Sitting in a public place 0  Passenger in a car 1  Lying down to rest 2  Sitting and talking to someone 0  Sitting quietly after lunch 1  In a car stopped in traffic 0  -----------------------------------------  Total 8    She has a wart on her foot that has been present for 2 years. She tried medicated pads, liquid wart remover. She has nearly removed it but it continues to come back and it hurts when she walks.      Past Medical History:   Diagnosis

## 2023-07-24 ENCOUNTER — APPOINTMENT (OUTPATIENT)
Dept: CT IMAGING | Age: 38
End: 2023-07-24
Payer: COMMERCIAL

## 2023-07-24 ENCOUNTER — OFFICE VISIT (OUTPATIENT)
Dept: PRIMARY CARE CLINIC | Age: 38
End: 2023-07-24

## 2023-07-24 ENCOUNTER — HOSPITAL ENCOUNTER (EMERGENCY)
Age: 38
Discharge: HOME | End: 2023-07-24
Attending: EMERGENCY MEDICINE
Payer: COMMERCIAL

## 2023-07-24 VITALS
RESPIRATION RATE: 16 BRPM | SYSTOLIC BLOOD PRESSURE: 109 MMHG | HEART RATE: 56 BPM | TEMPERATURE: 98.2 F | OXYGEN SATURATION: 100 % | DIASTOLIC BLOOD PRESSURE: 74 MMHG | HEIGHT: 65 IN | BODY MASS INDEX: 30.99 KG/M2 | WEIGHT: 186 LBS

## 2023-07-24 DIAGNOSIS — G43.809 OTHER MIGRAINE WITHOUT STATUS MIGRAINOSUS, NOT INTRACTABLE: Primary | ICD-10-CM

## 2023-07-24 DIAGNOSIS — R51.9 ACUTE INTRACTABLE HEADACHE, UNSPECIFIED HEADACHE TYPE: Primary | ICD-10-CM

## 2023-07-24 PROCEDURE — 6360000002 HC RX W HCPCS: Performed by: EMERGENCY MEDICINE

## 2023-07-24 PROCEDURE — 70450 CT HEAD/BRAIN W/O DYE: CPT

## 2023-07-24 PROCEDURE — 99999 PR OFFICE/OUTPT VISIT,PROCEDURE ONLY: CPT | Performed by: NURSE PRACTITIONER

## 2023-07-24 PROCEDURE — 2580000003 HC RX 258: Performed by: EMERGENCY MEDICINE

## 2023-07-24 PROCEDURE — 96375 TX/PRO/DX INJ NEW DRUG ADDON: CPT

## 2023-07-24 PROCEDURE — 96374 THER/PROPH/DIAG INJ IV PUSH: CPT

## 2023-07-24 PROCEDURE — 99284 EMERGENCY DEPT VISIT MOD MDM: CPT

## 2023-07-24 RX ORDER — PROCHLORPERAZINE EDISYLATE 5 MG/ML
10 INJECTION INTRAMUSCULAR; INTRAVENOUS ONCE
Status: COMPLETED | OUTPATIENT
Start: 2023-07-24 | End: 2023-07-24

## 2023-07-24 RX ORDER — DIPHENHYDRAMINE HYDROCHLORIDE 50 MG/ML
25 INJECTION INTRAMUSCULAR; INTRAVENOUS ONCE
Status: COMPLETED | OUTPATIENT
Start: 2023-07-24 | End: 2023-07-24

## 2023-07-24 RX ORDER — SUMATRIPTAN 50 MG/1
TABLET, FILM COATED ORAL
Qty: 10 TABLET | Refills: 0 | Status: SHIPPED | OUTPATIENT
Start: 2023-07-24

## 2023-07-24 RX ORDER — 0.9 % SODIUM CHLORIDE 0.9 %
1000 INTRAVENOUS SOLUTION INTRAVENOUS ONCE
Status: COMPLETED | OUTPATIENT
Start: 2023-07-24 | End: 2023-07-24

## 2023-07-24 RX ADMIN — PROCHLORPERAZINE EDISYLATE 10 MG: 5 INJECTION INTRAMUSCULAR; INTRAVENOUS at 16:33

## 2023-07-24 RX ADMIN — SODIUM CHLORIDE 1000 ML: 9 INJECTION, SOLUTION INTRAVENOUS at 16:29

## 2023-07-24 RX ADMIN — DIPHENHYDRAMINE HYDROCHLORIDE 25 MG: 50 INJECTION, SOLUTION INTRAMUSCULAR; INTRAVENOUS at 16:31

## 2023-07-24 ASSESSMENT — PAIN SCALES - GENERAL
PAINLEVEL_OUTOF10: 3
PAINLEVEL_OUTOF10: 3
PAINLEVEL_OUTOF10: 7

## 2023-07-24 ASSESSMENT — PAIN - FUNCTIONAL ASSESSMENT
PAIN_FUNCTIONAL_ASSESSMENT: 0-10
PAIN_FUNCTIONAL_ASSESSMENT: 0-10

## 2023-07-24 ASSESSMENT — LIFESTYLE VARIABLES
HOW OFTEN DO YOU HAVE A DRINK CONTAINING ALCOHOL: NEVER
HOW MANY STANDARD DRINKS CONTAINING ALCOHOL DO YOU HAVE ON A TYPICAL DAY: PATIENT DOES NOT DRINK

## 2023-07-24 ASSESSMENT — PAIN DESCRIPTION - DESCRIPTORS: DESCRIPTORS: SHARP

## 2023-07-24 ASSESSMENT — PAIN DESCRIPTION - LOCATION: LOCATION: HEAD

## 2023-07-24 ASSESSMENT — PAIN DESCRIPTION - ORIENTATION: ORIENTATION: RIGHT

## 2023-07-24 NOTE — ED PROVIDER NOTES
[facility]  EMERGENCY DEPARTMENT ENCOUNTER      Pt Name: Haydee Choi  MRN: [id number]  [address] [facility] 1985  Date of evaluation: 7/24/2023  Provider: Cherry Peng MD    CHIEF COMPLAINT     Chief Complaint   Patient presents with    Migraine         HISTORY OF PRESENT ILLNESS   (Location/Symptom, Timing/Onset, Context/Setting,Quality, Duration, Modifying Factors, Severity)  Note limiting factors. Haydee Choi is a 42 y.o. female who presents to the emergency department with a 3-day history of unremitting right hemicranial headache. She has a history of migraine headaches and they are almost always right hemicranial.  She is concerned because she has taken her usual doses of Fioricet and Imitrex and the headache is not resolved. She states that she was told in the remote past that she has a cyst in the right basal ganglia. No infectious symptoms are reported. The history is provided by the patient and medical records. Nursing Notes werereviewed. REVIEW OF SYSTEMS    (2-9 systems for level 4, 10 or more for level 5)     Review of Systems   All other systems reviewed and are negative. Except as noted above the remainder of the review of systems was reviewed and negative.        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

## 2023-07-24 NOTE — PROGRESS NOTES
HA for 3 days, left side of head. Pain is 8, unable to obtain relief at home.   Concerned that she may be having \"mini strokes states \"I have a cyst on the right side of my head I am scared it burst\"   Report called to ER pt to ER per wheelchair

## 2023-08-23 ENCOUNTER — TELEPHONE (OUTPATIENT)
Dept: FAMILY MEDICINE CLINIC | Age: 38
End: 2023-08-23

## 2023-08-23 DIAGNOSIS — R76.11 POSITIVE TB TEST: Primary | ICD-10-CM

## 2023-08-23 NOTE — TELEPHONE ENCOUNTER
Pt calling stating she's taking a STNA class and she reacts positive to a skin TB test so pt is needing a blood tested ordered by 9-1, will you order or does pt need to be seen, please advise.

## 2023-08-25 ENCOUNTER — HOSPITAL ENCOUNTER (OUTPATIENT)
Dept: GENERAL RADIOLOGY | Age: 38
Discharge: HOME | End: 2023-08-25
Payer: COMMERCIAL

## 2023-08-25 DIAGNOSIS — R76.11 POSITIVE TB TEST: ICD-10-CM

## 2023-08-25 PROCEDURE — 71046 X-RAY EXAM CHEST 2 VIEWS: CPT

## 2023-08-28 ENCOUNTER — TELEPHONE (OUTPATIENT)
Dept: FAMILY MEDICINE CLINIC | Age: 38
End: 2023-08-28

## 2023-08-28 DIAGNOSIS — G47.10 HYPERSOMNIA: Primary | ICD-10-CM

## 2023-09-15 DIAGNOSIS — M54.16 LUMBAR RADICULOPATHY: ICD-10-CM

## 2023-09-15 DIAGNOSIS — F31.9 BIPOLAR 1 DISORDER (HCC): ICD-10-CM

## 2023-09-15 DIAGNOSIS — G44.229 CHRONIC TENSION-TYPE HEADACHE, NOT INTRACTABLE: ICD-10-CM

## 2023-09-15 RX ORDER — ARIPIPRAZOLE 5 MG/1
5 TABLET ORAL DAILY
Qty: 30 TABLET | Refills: 2 | Status: SHIPPED | OUTPATIENT
Start: 2023-09-15

## 2023-09-15 RX ORDER — BUSPIRONE HYDROCHLORIDE 15 MG/1
15 TABLET ORAL 3 TIMES DAILY PRN
Qty: 90 TABLET | Refills: 2 | Status: SHIPPED | OUTPATIENT
Start: 2023-09-15

## 2023-09-15 RX ORDER — BUTALBITAL, ACETAMINOPHEN AND CAFFEINE 50; 325; 40 MG/1; MG/1; MG/1
1 TABLET ORAL EVERY 6 HOURS PRN
Qty: 30 TABLET | Refills: 3 | Status: SHIPPED | OUTPATIENT
Start: 2023-09-22

## 2023-09-15 RX ORDER — GABAPENTIN 300 MG/1
300 CAPSULE ORAL 3 TIMES DAILY
Qty: 90 CAPSULE | Refills: 0 | Status: SHIPPED | OUTPATIENT
Start: 2023-09-29 | End: 2023-12-28

## 2023-09-15 NOTE — TELEPHONE ENCOUNTER
Lisseth Delcid called requesting a refill of the below medication which has been pended for you: placed do not fill dates on the Fioricet and Neurontin for when they are due. Requested Prescriptions     Pending Prescriptions Disp Refills    busPIRone (BUSPAR) 15 MG tablet 90 tablet 2     Sig: Take 15 mg by mouth 3 times daily as needed (anxiety)    gabapentin (NEURONTIN) 300 MG capsule 90 capsule 2     Sig: Take 1 capsule by mouth 3 times daily for 90 days.     ARIPiprazole (ABILIFY) 5 MG tablet 30 tablet 3     Sig: Take 1 tablet by mouth daily    butalbital-acetaminophen-caffeine (FIORICET, ESGIC) -40 MG per tablet 30 tablet 3     Sig: Take 1 tablet by mouth every 6 hours as needed for Headaches       Last Appointment Date: 7/3/2023  Next Appointment Date: 11/17/2023    Allergies   Allergen Reactions    Latex      Powder in rubber gloves      Dust Mite Extract     Other      Pet dander    Pcn [Penicillins] Hives     Including Amoxicillin    Pollen Extract     Clindamycin/Lincomycin Nausea And Vomiting

## 2023-09-22 ENCOUNTER — HOSPITAL ENCOUNTER (OUTPATIENT)
Dept: MAMMOGRAPHY | Age: 38
Discharge: HOME | End: 2023-09-22
Payer: COMMERCIAL

## 2023-09-22 VITALS — HEIGHT: 65 IN | BODY MASS INDEX: 30.66 KG/M2 | WEIGHT: 184 LBS

## 2023-09-22 DIAGNOSIS — Z12.31 ENCOUNTER FOR SCREENING MAMMOGRAM FOR MALIGNANT NEOPLASM OF BREAST: ICD-10-CM

## 2023-09-22 PROCEDURE — 77063 BREAST TOMOSYNTHESIS BI: CPT

## 2023-09-25 NOTE — LETTER
[facility] department of [place] [address]  Phone: [phone]  Fax: [phone]    Dee Bearden MD        September 26, 2022     Patient: John Hurtado   YOB: 1985   Date of Visit: 9/26/2022       To Whom it May Concern:    John Hurtado was seen in my clinic on 9/26/2022, due to an appointment at 11:00 a.m. If you have any questions or concerns, please don't hesitate to call.     Sincerely,         Dee Bearden MD
no

## 2023-10-21 ENCOUNTER — OFFICE VISIT (OUTPATIENT)
Dept: PRIMARY CARE CLINIC | Age: 38
End: 2023-10-21
Payer: COMMERCIAL

## 2023-10-21 VITALS
OXYGEN SATURATION: 98 % | SYSTOLIC BLOOD PRESSURE: 110 MMHG | HEART RATE: 58 BPM | WEIGHT: 186 LBS | HEIGHT: 65 IN | BODY MASS INDEX: 30.99 KG/M2 | TEMPERATURE: 97.1 F | DIASTOLIC BLOOD PRESSURE: 72 MMHG

## 2023-10-21 DIAGNOSIS — K04.7 TOOTH INFECTION: ICD-10-CM

## 2023-10-21 DIAGNOSIS — J41.8 MIXED SIMPLE AND MUCOPURULENT CHRONIC BRONCHITIS (HCC): Primary | ICD-10-CM

## 2023-10-21 DIAGNOSIS — M54.16 LUMBAR RADICULOPATHY: ICD-10-CM

## 2023-10-21 PROCEDURE — 99214 OFFICE O/P EST MOD 30 MIN: CPT | Performed by: FAMILY MEDICINE

## 2023-10-21 RX ORDER — GABAPENTIN 300 MG/1
300 CAPSULE ORAL 3 TIMES DAILY
Qty: 90 CAPSULE | Refills: 1 | Status: SHIPPED | OUTPATIENT
Start: 2023-10-21 | End: 2023-12-20

## 2023-10-21 RX ORDER — CEFDINIR 300 MG/1
300 CAPSULE ORAL 2 TIMES DAILY
Qty: 20 CAPSULE | Refills: 0 | Status: SHIPPED | OUTPATIENT
Start: 2023-10-21 | End: 2023-10-31

## 2023-10-21 RX ORDER — METRONIDAZOLE 500 MG/1
500 TABLET ORAL 2 TIMES DAILY
Qty: 20 TABLET | Refills: 0 | Status: SHIPPED | OUTPATIENT
Start: 2023-10-21 | End: 2023-10-31

## 2023-10-21 RX ORDER — PREDNISONE 20 MG/1
20 TABLET ORAL 2 TIMES DAILY
Qty: 10 TABLET | Refills: 0 | Status: SHIPPED | OUTPATIENT
Start: 2023-10-21 | End: 2023-10-26

## 2023-10-21 RX ORDER — MONTELUKAST SODIUM 10 MG/1
10 TABLET ORAL DAILY
Qty: 30 TABLET | Refills: 3 | Status: SHIPPED | OUTPATIENT
Start: 2023-10-21

## 2023-10-21 ASSESSMENT — ENCOUNTER SYMPTOMS
CONSTIPATION: 0
NAUSEA: 0
CHOKING: 0
RHINORRHEA: 1
SHORTNESS OF BREATH: 1
SINUS PRESSURE: 0
WHEEZING: 1
CHEST TIGHTNESS: 0
SORE THROAT: 1
DIARRHEA: 0
SPUTUM PRODUCTION: 1
COUGH: 0
TROUBLE SWALLOWING: 0

## 2023-10-21 NOTE — PROGRESS NOTES
Dispense:  30 tablet     Refill:  3    gabapentin (NEURONTIN) 300 MG capsule     Sig: Take 1 capsule by mouth 3 times daily for 60 days. Dispense:  90 capsule     Refill:  1       Patientgiven educational materials - see patient instructions. Discussed use, benefit,and side effects of prescribed medications. All patient questions answered. Ptvoiced understanding. Reviewed health maintenance. Instructed to continue currentmedications, diet and exercise. Patient agreed with treatment plan. Follow up asdirected.      Electronically signed by Vita Guardado MD on 10/21/2023 at 1:39 PM

## 2023-11-17 ENCOUNTER — TELEPHONE (OUTPATIENT)
Dept: FAMILY MEDICINE CLINIC | Age: 38
End: 2023-11-17

## 2023-11-17 ENCOUNTER — OFFICE VISIT (OUTPATIENT)
Dept: FAMILY MEDICINE CLINIC | Age: 38
End: 2023-11-17

## 2023-11-17 VITALS
SYSTOLIC BLOOD PRESSURE: 116 MMHG | HEART RATE: 89 BPM | DIASTOLIC BLOOD PRESSURE: 78 MMHG | HEIGHT: 65 IN | OXYGEN SATURATION: 96 % | WEIGHT: 180.7 LBS | BODY MASS INDEX: 30.1 KG/M2

## 2023-11-17 DIAGNOSIS — F41.9 ANXIETY AND DEPRESSION: ICD-10-CM

## 2023-11-17 DIAGNOSIS — F32.A ANXIETY AND DEPRESSION: ICD-10-CM

## 2023-11-17 DIAGNOSIS — F31.9 BIPOLAR 1 DISORDER (HCC): ICD-10-CM

## 2023-11-17 DIAGNOSIS — Z01.84 IMMUNITY STATUS TESTING: Primary | ICD-10-CM

## 2023-11-17 DIAGNOSIS — Z00.00 WELL WOMAN EXAM (NO GYNECOLOGICAL EXAM): Primary | ICD-10-CM

## 2023-11-17 DIAGNOSIS — B07.0 PLANTAR WART: ICD-10-CM

## 2023-11-17 RX ORDER — ARIPIPRAZOLE 10 MG/1
10 TABLET ORAL DAILY
Qty: 30 TABLET | Refills: 3 | Status: SHIPPED | OUTPATIENT
Start: 2023-11-17

## 2023-11-17 RX ORDER — DIAZEPAM 2 MG/1
2 TABLET ORAL EVERY 8 HOURS PRN
Qty: 30 TABLET | Refills: 0 | Status: SHIPPED | OUTPATIENT
Start: 2023-11-17 | End: 2023-11-27

## 2023-11-17 ASSESSMENT — ENCOUNTER SYMPTOMS
SHORTNESS OF BREATH: 0
CHEST TIGHTNESS: 0
WHEEZING: 0
COUGH: 0

## 2023-11-17 NOTE — TELEPHONE ENCOUNTER
Dr Juan R Patrick approved to decrease quantity to #15. Did call the pharmacy back and spoke with Jose Elias Springer to let her know.

## 2023-11-17 NOTE — TELEPHONE ENCOUNTER
[facility] in [place] calling with concerns for Valium prescription. Patient has a previous fill for suboxone and pharmacist is concerned with addiction. Wondering if something else can be called in for patient due to history.

## 2023-11-17 NOTE — TELEPHONE ENCOUNTER
Patient seen today and scripts for increased dose of abilify and volume were to be called in but pharmacy has not received the scripts yet. Patient waiting for those today.
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
## 2023-11-21 ENCOUNTER — HOSPITAL ENCOUNTER (OUTPATIENT)
Age: 38
Discharge: HOME | End: 2023-11-21
Payer: COMMERCIAL

## 2023-11-21 DIAGNOSIS — Z01.84 IMMUNITY STATUS TESTING: ICD-10-CM

## 2023-11-21 PROCEDURE — 86787 VARICELLA-ZOSTER ANTIBODY: CPT

## 2023-11-21 PROCEDURE — 36415 COLL VENOUS BLD VENIPUNCTURE: CPT

## 2023-11-22 ENCOUNTER — HOSPITAL ENCOUNTER (EMERGENCY)
Age: 38
Discharge: HOME | End: 2023-11-22
Attending: EMERGENCY MEDICINE
Payer: COMMERCIAL

## 2023-11-22 VITALS
RESPIRATION RATE: 20 BRPM | DIASTOLIC BLOOD PRESSURE: 77 MMHG | TEMPERATURE: 97.5 F | SYSTOLIC BLOOD PRESSURE: 111 MMHG | OXYGEN SATURATION: 99 % | HEART RATE: 78 BPM

## 2023-11-22 DIAGNOSIS — G44.229 CHRONIC TENSION-TYPE HEADACHE, NOT INTRACTABLE: ICD-10-CM

## 2023-11-22 DIAGNOSIS — G43.001 MIGRAINE WITHOUT AURA AND WITH STATUS MIGRAINOSUS, NOT INTRACTABLE: Primary | ICD-10-CM

## 2023-11-22 LAB — VZV IGG SER QL IA: 3.42

## 2023-11-22 PROCEDURE — 96375 TX/PRO/DX INJ NEW DRUG ADDON: CPT

## 2023-11-22 PROCEDURE — 96365 THER/PROPH/DIAG IV INF INIT: CPT

## 2023-11-22 PROCEDURE — 6370000000 HC RX 637 (ALT 250 FOR IP): Performed by: EMERGENCY MEDICINE

## 2023-11-22 PROCEDURE — 6360000002 HC RX W HCPCS: Performed by: EMERGENCY MEDICINE

## 2023-11-22 PROCEDURE — 96366 THER/PROPH/DIAG IV INF ADDON: CPT

## 2023-11-22 PROCEDURE — 2580000003 HC RX 258: Performed by: EMERGENCY MEDICINE

## 2023-11-22 PROCEDURE — 99284 EMERGENCY DEPT VISIT MOD MDM: CPT

## 2023-11-22 RX ORDER — DIPHENHYDRAMINE HYDROCHLORIDE 50 MG/ML
25 INJECTION INTRAMUSCULAR; INTRAVENOUS ONCE
Status: COMPLETED | OUTPATIENT
Start: 2023-11-22 | End: 2023-11-22

## 2023-11-22 RX ORDER — KETOROLAC TROMETHAMINE 30 MG/ML
15 INJECTION, SOLUTION INTRAMUSCULAR; INTRAVENOUS ONCE
Status: COMPLETED | OUTPATIENT
Start: 2023-11-22 | End: 2023-11-22

## 2023-11-22 RX ORDER — DIAZEPAM 5 MG/1
2.5 TABLET ORAL ONCE
Status: COMPLETED | OUTPATIENT
Start: 2023-11-22 | End: 2023-11-22

## 2023-11-22 RX ORDER — 0.9 % SODIUM CHLORIDE 0.9 %
1000 INTRAVENOUS SOLUTION INTRAVENOUS ONCE
Status: COMPLETED | OUTPATIENT
Start: 2023-11-22 | End: 2023-11-22

## 2023-11-22 RX ORDER — MAGNESIUM SULFATE IN WATER 40 MG/ML
2000 INJECTION, SOLUTION INTRAVENOUS ONCE
Status: DISCONTINUED | OUTPATIENT
Start: 2023-11-22 | End: 2023-11-22 | Stop reason: CLARIF

## 2023-11-22 RX ORDER — PROCHLORPERAZINE EDISYLATE 5 MG/ML
10 INJECTION INTRAMUSCULAR; INTRAVENOUS ONCE
Status: COMPLETED | OUTPATIENT
Start: 2023-11-22 | End: 2023-11-22

## 2023-11-22 RX ORDER — MAGNESIUM SULFATE 1 G/100ML
1000 INJECTION INTRAVENOUS
Status: COMPLETED | OUTPATIENT
Start: 2023-11-22 | End: 2023-11-22

## 2023-11-22 RX ORDER — BUTALBITAL, ACETAMINOPHEN AND CAFFEINE 50; 325; 40 MG/1; MG/1; MG/1
1 TABLET ORAL EVERY 6 HOURS PRN
Qty: 30 TABLET | Refills: 0 | Status: SHIPPED | OUTPATIENT
Start: 2023-11-22

## 2023-11-22 RX ADMIN — MAGNESIUM SULFATE HEPTAHYDRATE 1000 MG: 1 INJECTION, SOLUTION INTRAVENOUS at 13:25

## 2023-11-22 RX ADMIN — DIAZEPAM 2.5 MG: 5 TABLET ORAL at 11:52

## 2023-11-22 RX ADMIN — MAGNESIUM SULFATE HEPTAHYDRATE 1000 MG: 1 INJECTION, SOLUTION INTRAVENOUS at 12:17

## 2023-11-22 RX ADMIN — DIPHENHYDRAMINE HYDROCHLORIDE 25 MG: 50 INJECTION INTRAMUSCULAR; INTRAVENOUS at 12:14

## 2023-11-22 RX ADMIN — KETOROLAC TROMETHAMINE 15 MG: 30 INJECTION INTRAMUSCULAR; INTRAVENOUS at 12:12

## 2023-11-22 RX ADMIN — SODIUM CHLORIDE 1000 ML: 9 INJECTION, SOLUTION INTRAVENOUS at 12:05

## 2023-11-22 RX ADMIN — PROCHLORPERAZINE EDISYLATE 10 MG: 5 INJECTION INTRAMUSCULAR; INTRAVENOUS at 12:06

## 2023-11-22 ASSESSMENT — PAIN SCALES - GENERAL
PAINLEVEL_OUTOF10: 8
PAINLEVEL_OUTOF10: 8

## 2023-11-22 ASSESSMENT — ENCOUNTER SYMPTOMS
EYE REDNESS: 0
NAUSEA: 0
CONSTIPATION: 0
VOMITING: 0
ABDOMINAL PAIN: 0
CHEST TIGHTNESS: 0
DIARRHEA: 0
COUGH: 0
SHORTNESS OF BREATH: 0

## 2023-11-22 ASSESSMENT — LIFESTYLE VARIABLES: HOW OFTEN DO YOU HAVE A DRINK CONTAINING ALCOHOL: NEVER

## 2023-11-22 ASSESSMENT — PAIN DESCRIPTION - ONSET: ONSET: GRADUAL

## 2023-11-22 ASSESSMENT — PAIN - FUNCTIONAL ASSESSMENT: PAIN_FUNCTIONAL_ASSESSMENT: INTOLERABLE, UNABLE TO DO ANY ACTIVE OR PASSIVE ACTIVITIES

## 2023-11-22 ASSESSMENT — PAIN DESCRIPTION - LOCATION
LOCATION: HEAD
LOCATION: HEAD

## 2023-11-22 ASSESSMENT — PAIN DESCRIPTION - PAIN TYPE: TYPE: ACUTE PAIN

## 2023-11-22 ASSESSMENT — PAIN DESCRIPTION - DESCRIPTORS: DESCRIPTORS: THROBBING

## 2023-11-22 ASSESSMENT — PAIN DESCRIPTION - FREQUENCY: FREQUENCY: CONTINUOUS

## 2023-11-22 NOTE — DISCHARGE INSTRUCTIONS
If you had imaging today, your results are:  No orders to display       Take your medication as indicated and prescribed. If you are given an antibiotic then, make sure you get the prescription filled and take the antibiotics until finished. PLEASE RETURN TO THE EMERGENCY DEPARTMENT IMMEDIATELY if your symptoms worsen in anyway or in 8-12 hours if not improved for re-evaluation. You should immediately return to the ER for symptoms such as increasing pain, bloody stool, fever, a feeling of passing out, light headed, dizziness, chest pain, shortness of breath, persistent nausea and/or vomiting, numbness or weakness to the arms or legs, coolness or color change of the arms or legs. Please understand that at this time there is no evidence for a more serious underlying process, but that early in the process of an illness or injury, an emergency department workup can be falsely reassuring. You should contact your family doctor within the next 24 hours for a follow up appointment. If you do not have one, we have attached the [facility] Same Day\" Physician line for you to call and they can provide you with one ([phone]). Lakshmi Flack YOU! From [facility] ([facility]) and [facility] Emergency Services    On behalf of the Emergency Department staff at [facility]), I would like to thank you for giving us the opportunity to address your health care needs and concerns. We hope that during your visit, our service was delivered in a professional and caring manner. Please keep [facility]) in mind as we walk with you down the path to your own personal wellness. Please expect an automated text message or email from us so we can ask a few questions about your health and progress. Based on your answers, a clinician may call you back to offer help and instructions. Please understand that early in the process of an illness or injury, an emergency department workup can be falsely reassuring.   If you notice any

## 2023-12-11 ENCOUNTER — TELEPHONE (OUTPATIENT)
Dept: FAMILY MEDICINE CLINIC | Age: 38
End: 2023-12-11

## 2023-12-11 DIAGNOSIS — G44.229 CHRONIC TENSION-TYPE HEADACHE, NOT INTRACTABLE: ICD-10-CM

## 2023-12-11 RX ORDER — BUSPIRONE HYDROCHLORIDE 15 MG/1
15 TABLET ORAL 3 TIMES DAILY PRN
Qty: 90 TABLET | Refills: 2 | Status: SHIPPED | OUTPATIENT
Start: 2023-12-11

## 2023-12-11 RX ORDER — BUTALBITAL, ACETAMINOPHEN AND CAFFEINE 50; 325; 40 MG/1; MG/1; MG/1
1 TABLET ORAL EVERY 6 HOURS PRN
Qty: 30 TABLET | Refills: 0 | Status: SHIPPED | OUTPATIENT
Start: 2023-12-11

## 2023-12-11 NOTE — TELEPHONE ENCOUNTER
Christi Gonsalves from [facility] in [facility] called stating that patient received her Fioricet from another provider and stated it was filled on 11/27/2023 per Anne Vargas. She wanted to know if Abdullahi Walls was aware of this and if she wanted to change the fill date.

## 2023-12-11 NOTE — TELEPHONE ENCOUNTER
Mariia Alexander called requesting a refill of the below medication which has been pended for you:     Requested Prescriptions     Pending Prescriptions Disp Refills    busPIRone (BUSPAR) 15 MG tablet 90 tablet 2     Sig: Take 15 mg by mouth 3 times daily as needed (anxiety)    butalbital-acetaminophen-caffeine (FIORICET, ESGIC) -40 MG per tablet 30 tablet 0     Sig: Take 1 tablet by mouth every 6 hours as needed for Headaches       Last Appointment Date: 11/17/2023  Next Appointment Date: 1/18/2024    Allergies   Allergen Reactions    Latex      Powder in rubber gloves      Dust Mite Extract     Other      Pet dander    Pcn [Penicillins] Hives     Including Amoxicillin    Pollen Extract     Clindamycin/Lincomycin Nausea And Vomiting

## 2023-12-14 DIAGNOSIS — M54.16 LUMBAR RADICULOPATHY: ICD-10-CM

## 2023-12-14 RX ORDER — GABAPENTIN 300 MG/1
300 CAPSULE ORAL 3 TIMES DAILY
Qty: 90 CAPSULE | Refills: 0 | Status: SHIPPED | OUTPATIENT
Start: 2023-12-21 | End: 2024-01-20

## 2023-12-14 NOTE — TELEPHONE ENCOUNTER
Per [facility], last fill 11/20/2023, quantity 90 for 30 days. [facility] called requesting a refill of the below medication which has been pended for you:     Requested Prescriptions     Pending Prescriptions Disp Refills    gabapentin (NEURONTIN) 300 MG capsule [Pharmacy Med Name: Gabapentin 300 MG Oral Capsule] 90 capsule 0     Sig: Take 1 capsule by mouth 3 times daily for 30 days.        Last Appointment Date: 11/17/2023  Next Appointment Date: 1/18/2024    Allergies   Allergen Reactions    Latex      Powder in rubber gloves      Dust Mite Extract     Other      Pet dander    Pcn [Penicillins] Hives     Including Amoxicillin    Pollen Extract     Clindamycin/Lincomycin Nausea And Vomiting

## 2023-12-26 DIAGNOSIS — G44.229 CHRONIC TENSION-TYPE HEADACHE, NOT INTRACTABLE: ICD-10-CM

## 2023-12-26 RX ORDER — BUTALBITAL, ACETAMINOPHEN AND CAFFEINE 50; 325; 40 MG/1; MG/1; MG/1
1 TABLET ORAL EVERY 6 HOURS PRN
Qty: 30 TABLET | Refills: 0 | Status: SHIPPED | OUTPATIENT
Start: 2023-12-26

## 2023-12-26 NOTE — TELEPHONE ENCOUNTER
Ladi Boyd called requesting a refill of the below medication which has been pended for you:     Requested Prescriptions     Pending Prescriptions Disp Refills    butalbital-acetaminophen-caffeine (FIORICET, ESGIC) -40 MG per tablet 30 tablet 0     Sig: Take 1 tablet by mouth every 6 hours as needed for Headaches       Last Appointment Date: 7/14/2021  Next Appointment Date: 1/18/2024    Allergies   Allergen Reactions    Latex      Powder in rubber gloves      Dust Mite Extract     Other      Pet dander    Pcn [Penicillins] Hives     Including Amoxicillin    Pollen Extract     Clindamycin/Lincomycin Nausea And Vomiting

## 2024-01-06 ENCOUNTER — HOSPITAL ENCOUNTER (OUTPATIENT)
Dept: GENERAL RADIOLOGY | Age: 39
End: 2024-01-06
Payer: COMMERCIAL

## 2024-01-06 ENCOUNTER — OFFICE VISIT (OUTPATIENT)
Dept: PRIMARY CARE CLINIC | Age: 39
End: 2024-01-06
Payer: COMMERCIAL

## 2024-01-06 VITALS
SYSTOLIC BLOOD PRESSURE: 126 MMHG | HEART RATE: 67 BPM | DIASTOLIC BLOOD PRESSURE: 76 MMHG | RESPIRATION RATE: 14 BRPM | TEMPERATURE: 97 F | OXYGEN SATURATION: 99 % | HEIGHT: 65 IN | BODY MASS INDEX: 31.89 KG/M2 | WEIGHT: 191.38 LBS

## 2024-01-06 DIAGNOSIS — M54.16 LUMBAR RADICULOPATHY: ICD-10-CM

## 2024-01-06 DIAGNOSIS — S86.911D STRAIN OF RIGHT KNEE, SUBSEQUENT ENCOUNTER: Primary | ICD-10-CM

## 2024-01-06 PROCEDURE — 99213 OFFICE O/P EST LOW 20 MIN: CPT | Performed by: FAMILY MEDICINE

## 2024-01-06 PROCEDURE — 72100 X-RAY EXAM L-S SPINE 2/3 VWS: CPT

## 2024-01-06 RX ORDER — CYCLOBENZAPRINE HCL 5 MG
5 TABLET ORAL NIGHTLY PRN
Qty: 30 TABLET | Refills: 0 | Status: SHIPPED | OUTPATIENT
Start: 2024-01-06

## 2024-01-06 RX ORDER — PREDNISONE 20 MG/1
TABLET ORAL
Qty: 15 TABLET | Refills: 0 | Status: SHIPPED | OUTPATIENT
Start: 2024-01-06

## 2024-01-06 ASSESSMENT — ENCOUNTER SYMPTOMS
WHEEZING: 0
BACK PAIN: 1
SHORTNESS OF BREATH: 0
CHEST TIGHTNESS: 0
COUGH: 0
BOWEL INCONTINENCE: 0

## 2024-01-06 NOTE — PROGRESS NOTES
[facility]  [address]  [facility] [address]  Dept: [phone]  Dept Fax: [phone]    Ann-Marie Chavez is a 38 y.o. female who presents today for her medical conditions/complaints as noted below.  Ann-Marie Chavez is c/o of   Chief Complaint   Patient presents with    Knee Pain     She is here for knee pain she was seen at [facility] urgent care on 01/01/24 she was given an injection.     Back Pain     She is also here for back pain 2 previous back surgery        HPI:     Here today for knee pain and back pain.    Knee Pain   The incident occurred more than 1 week ago. There was no injury mechanism. The pain is present in the right knee. The quality of the pain is described as aching. The pain is at a severity of 7/10. The pain is moderate. The pain has been Intermittent since onset. Associated symptoms include tingling (on her right leg near her knee). Pertinent negatives include no loss of motion, loss of sensation or numbness. She reports no foreign bodies present. The symptoms are aggravated by movement and weight bearing. Treatments tried: xray was normal, steriod injection. The treatment provided mild (only lasted 2 days) relief.   Back Pain  This is a recurrent problem. The current episode started in the past 7 days. The problem occurs constantly. The problem is unchanged. The pain is present in the lumbar spine. The quality of the pain is described as aching, burning, cramping, shooting and stabbing. The pain does not radiate. The pain is at a severity of 9/10. The pain is severe. The pain is Worse during the night. The symptoms are aggravated by lying down, standing, bending and twisting. Associated symptoms include tingling (on her right leg near her knee). Pertinent negatives include no bladder incontinence, bowel incontinence, chest pain, fever, leg pain, numbness, paresis,  3

## 2024-01-12 DIAGNOSIS — M54.16 LUMBAR RADICULOPATHY: ICD-10-CM

## 2024-01-12 NOTE — TELEPHONE ENCOUNTER
Ann-Marie called requesting a refill of the below medication which has been pended for you:  Per [facility] last fill date was 12/19/2023   Patient has appt. 1/18/2024     Requested Prescriptions     Pending Prescriptions Disp Refills    gabapentin (NEURONTIN) 300 MG capsule [Pharmacy Med Name: Gabapentin 300 MG Oral Capsule] 90 capsule 0     Sig: Take 1 capsule by mouth 3 times daily.       Last Appointment Date: 1/6/2024  Next Appointment Date: 1/18/2024    Allergies   Allergen Reactions    Latex      Powder in rubber gloves      Dust Mite Extract     Other      Pet dander    Pcn [Penicillins] Hives     Including Amoxicillin    Pollen Extract     Clindamycin/Lincomycin Nausea And Vomiting

## 2024-01-15 DIAGNOSIS — M25.561 CHRONIC PAIN OF RIGHT KNEE: Primary | ICD-10-CM

## 2024-01-15 DIAGNOSIS — G89.29 CHRONIC PAIN OF RIGHT KNEE: Primary | ICD-10-CM

## 2024-01-15 RX ORDER — GABAPENTIN 300 MG/1
300 CAPSULE ORAL 3 TIMES DAILY
Qty: 90 CAPSULE | Refills: 2 | Status: SHIPPED | OUTPATIENT
Start: 2024-01-15 | End: 2024-04-14

## 2024-01-15 NOTE — PROGRESS NOTES
[address] [place] [address]  Dept: [phone]  Dept Fax: [phone]  Loc: [phone]    Parish Jacobson is a 38 y.o. female who presents today for her medical conditions/complaints as noted below. Parish Jacobson is c/o of   Chief Complaint   Patient presents with    Chest Pain     follow up from er, also tired, eyes red and burining, pain in Liver        HPI:     HPI Here today for ER follow ups for chest pain. She has been seen in the ER for chest pain twice. She has also had a globus feeling for the past few weeks. A few days ago she had spaghetti for dinner and she had horrible RUQ pain. The pain is nearly constant, but it gets worse every time she eats. She feels like even when she is drinking water that it has to \"bypass something\" to get down her throat. She is feeling really overwhelmed and tired. She is feeling stressed because she is fostering her nieces and she is working on moving. No change in her bowels. No diarrhea. No blood in the stool. Nothing makes her abdominal pain better. She has been taking the buspar and it is really helping to control her symptoms, but it doesn't last long enough. She hasn't been taking her vraylar because she tends to forget it. She is not taking anything for GERD. She does still have a gallbladder.       Past Medical History:   Diagnosis Date    Anxiety     Bipolar 1 disorder ([facility]     COPD (chronic obstructive pulmonary disease) (HCC)     Headache     Major depressive disorder     PTSD (post-traumatic stress disorder)     Substance abuse ([facility]     recovering alcoholic and drug [id number] mo sober          Social History     Tobacco Use    Smoking status: Current Every Day Smoker     Packs/day: 0.25     Years: 25.00     Pack years: 6.25     Types: Cigarettes     Start date: 1/1/1995    Smokeless tobacco: Never Used    Tobacco comment: vaping, trying to Patient is a very pleasant 28-year-old female with past medical history of hyperparathyroidism s/p  surgical removal April 2023, type 2 DM and dental infection who presents for follow up of trigeminal nerve injury.    To review, patient with symptom onset after a complicated dental procedure.  She underwent an attempt for a root canal of tooth #30 however during the procedure she felt an immense amount of pain and the procedure had to be aborted.  She initially had numbness of the right V3 distribution with transient involvement of V1 and V2 however this then continued with residual right medial chin numbness which caused difficulty eating, lip biting and discomfort.  Physical exam with decreased sensation to temperature, pinprick and light touch around this area.Today the numbness persists and she has learned to compensate by not chewing on her right.  Her vitamin B12 was 253.  I ordered B12 injections to help the nerve healing process.  So far she has completed weekly B12 injections without any movement.    Today I reviewed documentation provided from dental office alongside patient. I believe that symptoms are due to permanent nerve damage from prior root canal attempt vs, less likely, ongoing damage (compression) by inferior alveolar nerve compression from tooth #30. I advised patient to seek out a new dental provider to attempt a root canal on this tooth safely.  If symptoms do not improve after that we can confirm that the nerve was damaged from prior attempt.  Patient is to complete 4 months of monthly injections of vitamin B12 at 1000 mcg.  I will see her in 6 months again to evaluate progress.    quit   Substance Use Topics    Alcohol use: No     Comment: recovering alcholic quit june 28, 9123     Current Outpatient Medications   Medication Sig Dispense Refill    busPIRone (BUSPAR) 10 MG tablet Take 1 tablet by mouth 3 times daily as needed (anxiety) 90 tablet 3    omeprazole (PRILOSEC) 40 MG delayed release capsule Take 1 capsule by mouth every morning (before breakfast) 30 capsule 1    fluticasone-umeclidin-vilant (TRELEGY ELLIPTA) 100-62.5-25 MCG/INH AEPB Inhale 1 puff into the lungs daily 30 each 5    naproxen (NAPROSYN) 250 MG tablet Take 1 tablet by mouth 2 times daily (with meals) for 14 days (Patient not taking: Reported on 8/19/2021) 28 tablet 0    cariprazine hcl (VRAYLAR) 1.5 MG capsule Take 1 capsule by mouth daily (Patient not taking: Reported on 8/19/2021) 30 capsule 3     No current facility-administered medications for this visit. Allergies   Allergen Reactions    Latex      Powder in rubber gloves      Clindamycin/Lincomycin     Dust Mite Extract     Other      Pet dander    Pcn [Penicillins]      Including Amoxicillin    Pollen Extract        Subjective:     Review of Systems   Constitutional: Negative for activity change, appetite change, chills, fatigue and fever. HENT: Positive for dental problem. Eyes: Negative for visual disturbance. Respiratory: Negative for cough, chest tightness, shortness of breath and wheezing. Cardiovascular: Negative for chest pain, palpitations and leg swelling. Gastrointestinal: Positive for abdominal pain and nausea. Negative for constipation and diarrhea. Genitourinary: Negative for difficulty urinating. Neurological: Positive for headaches. Negative for dizziness, syncope, weakness and light-headedness. Psychiatric/Behavioral: Positive for decreased concentration and sleep disturbance. Negative for dysphoric mood. The patient is nervous/anxious. Objective:      Physical Exam  Vitals and nursing note reviewed. Medications    busPIRone (BUSPAR) 10 MG tablet     Sig: Take 1 tablet by mouth 3 times daily as needed (anxiety)     Dispense:  90 tablet     Refill:  3    omeprazole (PRILOSEC) 40 MG delayed release capsule     Sig: Take 1 capsule by mouth every morning (before breakfast)     Dispense:  30 capsule     Refill:  1       Patientgiven educational materials - see patient instructions. Discussed use, benefit,and side effects of prescribed medications. All patient questions answered. Ptvoiced understanding. Reviewed health maintenance. Instructed to continue currentmedications, diet and exercise. Patient agreed with treatment plan. Follow up asdirected.      Electronically signed by Yesenia Valente MD on 8/19/2021 at 7:22 PM

## 2024-02-14 ENCOUNTER — TELEPHONE (OUTPATIENT)
Dept: SLEEP CENTER | Age: 39
End: 2024-02-14

## 2024-02-14 NOTE — TELEPHONE ENCOUNTER
7/10/23 info packet was sent to the patient for the sleep study. 8/23/23 Pt called to cancel appt and did not want to reschedule.9/8/23 Called pt left message to call us back to do a home sleep study. 1/25/24 Called pt left a message to call us back about doing a home sleep study. This patient has never called us back if pt wants to do a home sleep study in the future then pt will need a new updated H&P and orders for a home sleep study.

## 2024-03-04 ENCOUNTER — TELEPHONE (OUTPATIENT)
Dept: FAMILY MEDICINE CLINIC | Age: 39
End: 2024-03-04

## 2024-03-04 NOTE — TELEPHONE ENCOUNTER
Attempted to reach patient regarding missed appointment on 3/4/24. Unable to contact at this time. Unable to leave message. Letter mailed to reschedule.

## 2024-03-09 DIAGNOSIS — F31.9 BIPOLAR 1 DISORDER (HCC): ICD-10-CM

## 2024-03-11 NOTE — TELEPHONE ENCOUNTER
Ann-Marie called requesting a refill of the below medication which has been pended for you:   Attempted to contact patient, no answer and unable to leave message for patient to schedule 4 month anxiety follow up  Requested Prescriptions     Pending Prescriptions Disp Refills    ARIPiprazole (ABILIFY) 10 MG tablet [Pharmacy Med Name: ARIPiprazole 10 MG Oral Tablet] 30 tablet 0     Sig: Take 1 tablet by mouth once daily       Last Appointment Date: 1/6/2024  Next Appointment Date: Visit date not found    Allergies   Allergen Reactions    Latex      Powder in rubber gloves      Dust Mite Extract     Other      Pet dander    Pcn [Penicillins] Hives     Including Amoxicillin    Pollen Extract     Clindamycin/Lincomycin Nausea And Vomiting

## 2024-03-12 NOTE — TELEPHONE ENCOUNTER
Attempted to reach patient to set up follow up appt but voicemail full and unable to leave message.

## 2024-03-13 RX ORDER — ARIPIPRAZOLE 10 MG/1
10 TABLET ORAL DAILY
Qty: 30 TABLET | Refills: 1 | Status: SHIPPED | OUTPATIENT
Start: 2024-03-13

## 2024-03-13 NOTE — TELEPHONE ENCOUNTER
Ann-Marie called requesting a refill of the below medication which has been pended for you:     Requested Prescriptions     Pending Prescriptions Disp Refills    ARIPiprazole (ABILIFY) 10 MG tablet [Pharmacy Med Name: ARIPiprazole 10 MG Oral Tablet] 30 tablet 0     Sig: Take 1 tablet by mouth once daily       Last Appointment Date: 1/6/2024  Next Appointment Date: 4/25/2024    Allergies   Allergen Reactions    Latex      Powder in rubber gloves      Dust Mite Extract     Other      Pet dander    Pcn [Penicillins] Hives     Including Amoxicillin    Pollen Extract     Clindamycin/Lincomycin Nausea And Vomiting

## 2024-03-15 ENCOUNTER — TELEPHONE (OUTPATIENT)
Dept: FAMILY MEDICINE CLINIC | Age: 39
End: 2024-03-15

## 2024-04-11 ENCOUNTER — HOSPITAL ENCOUNTER (EMERGENCY)
Age: 39
Discharge: HOME | End: 2024-04-12
Attending: EMERGENCY MEDICINE
Payer: COMMERCIAL

## 2024-04-11 VITALS
HEART RATE: 89 BPM | DIASTOLIC BLOOD PRESSURE: 79 MMHG | WEIGHT: 198 LBS | SYSTOLIC BLOOD PRESSURE: 132 MMHG | RESPIRATION RATE: 16 BRPM | TEMPERATURE: 97.6 F | OXYGEN SATURATION: 98 % | BODY MASS INDEX: 32.99 KG/M2 | HEIGHT: 65 IN

## 2024-04-11 DIAGNOSIS — K59.00 CONSTIPATION, UNSPECIFIED CONSTIPATION TYPE: Primary | ICD-10-CM

## 2024-04-11 LAB
CHP ED QC CHECK: NORMAL
PREGNANCY TEST URINE, POC: NEGATIVE

## 2024-04-11 PROCEDURE — 99284 EMERGENCY DEPT VISIT MOD MDM: CPT

## 2024-04-11 ASSESSMENT — PAIN - FUNCTIONAL ASSESSMENT: PAIN_FUNCTIONAL_ASSESSMENT: 0-10

## 2024-04-12 ENCOUNTER — APPOINTMENT (OUTPATIENT)
Dept: CT IMAGING | Age: 39
End: 2024-04-12
Payer: COMMERCIAL

## 2024-04-12 PROCEDURE — 74176 CT ABD & PELVIS W/O CONTRAST: CPT

## 2024-04-12 ASSESSMENT — ENCOUNTER SYMPTOMS
ABDOMINAL PAIN: 1
CONSTIPATION: 1

## 2024-04-12 NOTE — ED NOTES
Pt instructed to retain enema as long as possible for better results. Call light with in Pt's reach.

## 2024-04-12 NOTE — ED PROVIDER NOTES
EMERGENCY DEPARTMENT ENCOUNTER    Pt Name: Ann-Marie Chavez  MRN: [id number]  Birthdate 1985  Date of evaluation: 4/12/24  CHIEF COMPLAINT       Chief Complaint   Patient presents with    Constipation     Patient states she hasn't had a bm in 3 weeks. [facility] Patient states she just finished detox. Patient states she was on fentanyl.   Patient states she has taken colace, prune juice, milk of mag, magnesium citrate, miralax. Patient states she is passing gas, bu unable to have a BM.         Abdominal Pain     Patient is having severe abdominal cramping.      HISTORY OF PRESENT ILLNESS   38-year-old female presents emergency room for abdominal cramping discomfort and inability to have a bowel movement.  Patient currently living at a sober living facility.  She has had quite a bit of constipation after going through detox.  She has tried multiple things at home including Colace prune juice magnesium citrate and MiraLAX.             REVIEW OF SYSTEMS     Review of Systems   Gastrointestinal:  Positive for abdominal pain and constipation.     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

## 2024-04-16 LAB — HCG, PREGNANCY URINE (POC): NEGATIVE

## 2024-04-20 ENCOUNTER — APPOINTMENT (OUTPATIENT)
Dept: GENERAL RADIOLOGY | Age: 39
End: 2024-04-20
Payer: COMMERCIAL

## 2024-04-20 ENCOUNTER — HOSPITAL ENCOUNTER (EMERGENCY)
Age: 39
Discharge: HOME | End: 2024-04-20
Attending: EMERGENCY MEDICINE
Payer: COMMERCIAL

## 2024-04-20 VITALS
DIASTOLIC BLOOD PRESSURE: 79 MMHG | HEART RATE: 77 BPM | SYSTOLIC BLOOD PRESSURE: 113 MMHG | BODY MASS INDEX: 30.82 KG/M2 | RESPIRATION RATE: 18 BRPM | HEIGHT: 65 IN | TEMPERATURE: 97.7 F | WEIGHT: 185 LBS | OXYGEN SATURATION: 98 %

## 2024-04-20 DIAGNOSIS — N30.01 ACUTE CYSTITIS WITH HEMATURIA: Primary | ICD-10-CM

## 2024-04-20 LAB
AMORPH SED URNS QL MICRO: ABNORMAL
BILIRUB UR QL STRIP: NEGATIVE
CLARITY UR: ABNORMAL
COLOR UR: YELLOW
EPI CELLS #/AREA URNS HPF: ABNORMAL /HPF (ref 0–5)
GLUCOSE UR STRIP-MCNC: NEGATIVE MG/DL
HCG UR QL: NEGATIVE
HGB UR QL STRIP.AUTO: ABNORMAL
KETONES UR STRIP-MCNC: NEGATIVE MG/DL
LEUKOCYTE ESTERASE UR QL STRIP: ABNORMAL
NITRITE UR QL STRIP: NEGATIVE
PH UR STRIP: 6.5 [PH] (ref 5–8)
PROT UR STRIP-MCNC: ABNORMAL MG/DL
RBC #/AREA URNS HPF: ABNORMAL /HPF (ref 0–2)
SP GR UR STRIP: 1.02 (ref 1–1.03)
UROBILINOGEN UR STRIP-ACNC: NORMAL EU/DL (ref 0–1)
WBC #/AREA URNS HPF: ABNORMAL /HPF (ref 0–5)

## 2024-04-20 PROCEDURE — 81025 URINE PREGNANCY TEST: CPT

## 2024-04-20 PROCEDURE — 74018 RADEX ABDOMEN 1 VIEW: CPT

## 2024-04-20 PROCEDURE — 87088 URINE BACTERIA CULTURE: CPT

## 2024-04-20 PROCEDURE — 81001 URINALYSIS AUTO W/SCOPE: CPT

## 2024-04-20 PROCEDURE — 6370000000 HC RX 637 (ALT 250 FOR IP)

## 2024-04-20 PROCEDURE — 99284 EMERGENCY DEPT VISIT MOD MDM: CPT

## 2024-04-20 PROCEDURE — 87086 URINE CULTURE/COLONY COUNT: CPT

## 2024-04-20 PROCEDURE — 87186 SC STD MICRODIL/AGAR DIL: CPT

## 2024-04-20 RX ORDER — SULFAMETHOXAZOLE AND TRIMETHOPRIM 800; 160 MG/1; MG/1
1 TABLET ORAL 2 TIMES DAILY
Qty: 14 TABLET | Refills: 0 | Status: SHIPPED | OUTPATIENT
Start: 2024-04-20 | End: 2024-04-27

## 2024-04-20 RX ORDER — PHENAZOPYRIDINE HYDROCHLORIDE 100 MG/1
100 TABLET, FILM COATED ORAL ONCE
Status: COMPLETED | OUTPATIENT
Start: 2024-04-20 | End: 2024-04-20

## 2024-04-20 RX ORDER — SULFAMETHOXAZOLE AND TRIMETHOPRIM 800; 160 MG/1; MG/1
1 TABLET ORAL ONCE
Status: COMPLETED | OUTPATIENT
Start: 2024-04-20 | End: 2024-04-20

## 2024-04-20 RX ADMIN — SULFAMETHOXAZOLE AND TRIMETHOPRIM 1 TABLET: 800; 160 TABLET ORAL at 17:36

## 2024-04-20 RX ADMIN — PHENAZOPYRIDINE 100 MG: 100 TABLET ORAL at 17:36

## 2024-04-20 ASSESSMENT — PAIN SCALES - GENERAL: PAINLEVEL_OUTOF10: 3

## 2024-04-20 NOTE — ED PROVIDER NOTES
WISDOM TOOTH EXTRACTION       CURRENT MEDICATIONS       Previous Medications    ALBUTEROL SULFATE HFA (PROVENTIL;VENTOLIN;PROAIR) 108 (90 BASE) MCG/ACT INHALER    Inhale 2 puffs into the lungs every 4 hours as needed for Wheezing or Shortness of Breath    ARIPIPRAZOLE (ABILIFY) 10 MG TABLET    Take 1 tablet by mouth once daily    BUSPIRONE (BUSPAR) 15 MG TABLET    Take 15 mg by mouth 3 times daily as needed (anxiety)    BUTALBITAL-ACETAMINOPHEN-CAFFEINE (FIORICET, ESGIC) -40 MG PER TABLET    Take 1 tablet by mouth every 6 hours as needed for Headaches    CYCLOBENZAPRINE (FLEXERIL) 5 MG TABLET    Take 1 tablet by mouth nightly as needed for Muscle spasms    FLUTICASONE-UMECLIDIN-VILANT (TRELEGY ELLIPTA) 100-62.5-25 MCG/INH AEPB    Inhale 1 puff into the lungs daily    GABAPENTIN (NEURONTIN) 300 MG CAPSULE    Take 1 capsule by mouth 3 times daily for 90 days.    MONTELUKAST (SINGULAIR) 10 MG TABLET    Take 1 tablet by mouth daily    PREDNISONE (DELTASONE) 20 MG TABLET    1 bid for 5 days, 1 qd for 5 days    SUMATRIPTAN (IMITREX) 50 MG TABLET    You may take up to 2 tablets in a 24-hour period, up to 2 hours apart, if needed for headache. Do not exceed 2 tablets a day.     ALLERGIES     is allergic to latex, dust mite extract, other, pcn [penicillins], pollen extract, and clindamycin/lincomycin.  FAMILY HISTORY     She indicated that her mother is . She indicated that her father is . She indicated that both of her sisters are alive. She indicated that her maternal grandmother is . She indicated that her maternal grandfather is . She indicated that her paternal grandmother is alive. She indicated that her paternal grandfather is . She indicated that only one of her four maternal aunts is alive. She indicated that her paternal aunt is alive.     SOCIAL HISTORY       Social History     Tobacco Use    Smoking status: Former     Current packs/day: 0.00     Average packs/day: 0.3

## 2024-04-20 NOTE — ED NOTES
Px rounded on to initiate IV. Px states she does not want blood work and would like to go home after urine results. Lesa MERLOS notified

## 2024-04-20 NOTE — ED PROVIDER NOTES
formerly [facility] ED  eMERGENCY dEPARTMENT eNCOUnter      Pt Name: Ann-Marie Chavez  MRN: [id number]  Birthdate 1985  Date of evaluation: 4/20/2024  Provider: MARVIN Polo CNP    CHIEF COMPLAINT       Chief Complaint   Patient presents with    Urinary Tract Infection     Pt states for 1 week she has been dealing with painful urination and blood in her urine.          HISTORY OF PRESENT ILLNESS  (Location/Symptom, Timing/Onset, Context/Setting, Quality, Duration, Modifying Factors, Severity.)   Ann-Marie Chavez is a 38 y.o. female who presents to the emergency department with complaint of burning with urination and blood in her urine.  Burning started approximately a week ago, no pain in her abdomen or pelvis when not urinating.  Patient states she has history of kidney stones, has not been diagnosed with a kidney stone about 10 years and does not currently follow with urology for any reason.  No back or flank pain.  No nausea or vomiting, chest pain or shortness of breath.  Patient eating and drinking normally, no recent antibiotic use.  No concern for STI or pregnancy.      Nursing Notes were reviewed.    ALLERGIES     Latex, Dust mite extract, Other, Pcn [penicillins], Pollen extract, and Clindamycin/lincomycin    CURRENT MEDICATIONS       Discharge Medication List as of 4/20/2024  5:24 PM        CONTINUE these medications which have NOT CHANGED    Details   ARIPiprazole (ABILIFY) 10 MG tablet Take 1 tablet by mouth once daily, Disp-30 tablet, R-1Normal      gabapentin (NEURONTIN) 300 MG capsule Take 1 capsule by mouth 3 times daily for 90 days., Disp-90 capsule, R-2Normal      predniSONE (DELTASONE) 20 MG tablet 1 bid for 5 days, 1 qd for 5 days, Disp-15 tablet, R-0Normal      cyclobenzaprine (FLEXERIL) 5 MG tablet Take 1 tablet by mouth nightly as needed for Muscle spasms, Disp-30 tablet, R-0Normal      butalbital-acetaminophen-caffeine (FIORICET, ESGIC) -40 MG per tablet Take 1 tablet by    DISPOSITION Decision To Discharge 04/20/2024 05:24:10 PM      PATIENT REFERRED TO:   Vanna Collier MD  [address] [facility] [address]  [phone]    Schedule an appointment as soon as possible for a visit       [facility] ED  [facility] [place] [address]  [phone]  Go to   New or worsening symptoms      DISCHARGE MEDICATIONS:     Discharge Medication List as of 4/20/2024  5:24 PM        START taking these medications    Details   sulfamethoxazole-trimethoprim (BACTRIM DS) 800-160 MG per tablet Take 1 tablet by mouth 2 times daily for 7 days, Disp-14 tablet, R-0Normal                 (Please note that portions of this note were completed with a voice recognition program.  Efforts were made to edit the dictations but occasionally words are mis-transcribed.)    MARVIN Polo CNP, Audrey, APRN - CNP  04/21/24 1115

## 2024-04-21 LAB
MICROORGANISM SPEC CULT: ABNORMAL
SPECIMEN DESCRIPTION: ABNORMAL

## 2024-04-22 LAB
MICROORGANISM SPEC CULT: ABNORMAL
SPECIMEN DESCRIPTION: ABNORMAL

## 2024-04-25 ENCOUNTER — TELEMEDICINE (OUTPATIENT)
Dept: FAMILY MEDICINE CLINIC | Age: 39
End: 2024-04-25
Payer: COMMERCIAL

## 2024-04-25 DIAGNOSIS — Z31.69 INFERTILITY COUNSELING: Primary | ICD-10-CM

## 2024-04-25 DIAGNOSIS — R53.83 OTHER FATIGUE: ICD-10-CM

## 2024-04-25 PROCEDURE — 99213 OFFICE O/P EST LOW 20 MIN: CPT | Performed by: FAMILY MEDICINE

## 2024-04-25 PROCEDURE — G8427 DOCREV CUR MEDS BY ELIG CLIN: HCPCS | Performed by: FAMILY MEDICINE

## 2024-04-25 RX ORDER — TRAZODONE HYDROCHLORIDE 100 MG/1
TABLET ORAL
COMMUNITY
Start: 2024-04-22

## 2024-04-25 RX ORDER — FLUVOXAMINE MALEATE 100 MG/1
CAPSULE, EXTENDED RELEASE ORAL
COMMUNITY
Start: 2024-04-19

## 2024-04-25 RX ORDER — ONDANSETRON 4 MG/1
TABLET, FILM COATED ORAL
COMMUNITY
Start: 2024-04-19

## 2024-04-25 RX ORDER — CARIPRAZINE 1.5 MG/1
CAPSULE, GELATIN COATED ORAL
COMMUNITY
Start: 2024-04-19

## 2024-04-25 RX ORDER — IBUPROFEN 600 MG/1
TABLET ORAL
COMMUNITY
Start: 2024-04-22

## 2024-04-25 RX ORDER — HYDROXYZINE PAMOATE 25 MG/1
CAPSULE ORAL
COMMUNITY
Start: 2024-04-10

## 2024-04-25 RX ORDER — BUPRENORPHINE AND NALOXONE 12; 3 MG/1; MG/1
FILM, SOLUBLE BUCCAL; SUBLINGUAL
COMMUNITY
Start: 2024-04-22

## 2024-04-25 SDOH — ECONOMIC STABILITY: FOOD INSECURITY: WITHIN THE PAST 12 MONTHS, YOU WORRIED THAT YOUR FOOD WOULD RUN OUT BEFORE YOU GOT MONEY TO BUY MORE.: NEVER TRUE

## 2024-04-25 SDOH — ECONOMIC STABILITY: HOUSING INSECURITY
IN THE LAST 12 MONTHS, WAS THERE A TIME WHEN YOU DID NOT HAVE A STEADY PLACE TO SLEEP OR SLEPT IN A SHELTER (INCLUDING NOW)?: NO

## 2024-04-25 SDOH — ECONOMIC STABILITY: INCOME INSECURITY: HOW HARD IS IT FOR YOU TO PAY FOR THE VERY BASICS LIKE FOOD, HOUSING, MEDICAL CARE, AND HEATING?: NOT HARD AT ALL

## 2024-04-25 SDOH — ECONOMIC STABILITY: FOOD INSECURITY: WITHIN THE PAST 12 MONTHS, THE FOOD YOU BOUGHT JUST DIDN'T LAST AND YOU DIDN'T HAVE MONEY TO GET MORE.: NEVER TRUE

## 2024-04-25 ASSESSMENT — PATIENT HEALTH QUESTIONNAIRE - PHQ9
9. THOUGHTS THAT YOU WOULD BE BETTER OFF DEAD, OR OF HURTING YOURSELF: NOT AT ALL
2. FEELING DOWN, DEPRESSED OR HOPELESS: NOT AT ALL
10. IF YOU CHECKED OFF ANY PROBLEMS, HOW DIFFICULT HAVE THESE PROBLEMS MADE IT FOR YOU TO DO YOUR WORK, TAKE CARE OF THINGS AT HOME, OR GET ALONG WITH OTHER PEOPLE: NOT DIFFICULT AT ALL
SUM OF ALL RESPONSES TO PHQ QUESTIONS 1-9: 0
3. TROUBLE FALLING OR STAYING ASLEEP: NOT AT ALL
SUM OF ALL RESPONSES TO PHQ QUESTIONS 1-9: 0
8. MOVING OR SPEAKING SO SLOWLY THAT OTHER PEOPLE COULD HAVE NOTICED. OR THE OPPOSITE, BEING SO FIGETY OR RESTLESS THAT YOU HAVE BEEN MOVING AROUND A LOT MORE THAN USUAL: NOT AT ALL
SUM OF ALL RESPONSES TO PHQ QUESTIONS 1-9: 0
4. FEELING TIRED OR HAVING LITTLE ENERGY: NOT AT ALL
SUM OF ALL RESPONSES TO PHQ QUESTIONS 1-9: 0
1. LITTLE INTEREST OR PLEASURE IN DOING THINGS: NOT AT ALL
7. TROUBLE CONCENTRATING ON THINGS, SUCH AS READING THE NEWSPAPER OR WATCHING TELEVISION: NOT AT ALL
5. POOR APPETITE OR OVEREATING: NOT AT ALL
SUM OF ALL RESPONSES TO PHQ9 QUESTIONS 1 & 2: 0
6. FEELING BAD ABOUT YOURSELF - OR THAT YOU ARE A FAILURE OR HAVE LET YOURSELF OR YOUR FAMILY DOWN: NOT AT ALL

## 2024-04-25 ASSESSMENT — ENCOUNTER SYMPTOMS
WHEEZING: 0
COUGH: 0
SHORTNESS OF BREATH: 0
CHEST TIGHTNESS: 0

## 2024-04-25 NOTE — PROGRESS NOTES
2024    TELEHEALTH EVALUATION -- Audio/Visual    HPI: Seen today via video visit while she was at a recovery center     Ann-Marie Chavez (:  1985) has requested an audio/video evaluation for the following concern(s):    Here today for a follow up of her depression and anxiety. She had a relapse and she is at [facility] since April 10. She is was at [facility] at first and then she ended up at [facility]. She is not sure how long she has to stay but likely several more months. She is seeing a psychiatrist. She is getting suboxone as well. She is getting luvox now and they are going to try treating her for ADHD as well.     Her mom had a history of narcolepsy and Ann-Marie has been recently having similar problems. She is struggling to stay awake when she is in groups she is really struggling to stay awake. She is waking up every hour or so. She feels like she is not getting restful sleep. She does snore, but she purrs. She is not gasping in her sleep.     Review of Systems   Constitutional:  Positive for fatigue. Negative for activity change, appetite change, chills and fever.   Eyes:  Negative for visual disturbance.   Respiratory:  Negative for cough, chest tightness, shortness of breath and wheezing.    Cardiovascular:  Negative for chest pain, palpitations and leg swelling.   Genitourinary:  Negative for difficulty urinating.   Neurological:  Negative for dizziness, syncope, weakness, light-headedness and headaches (better recently).   Psychiatric/Behavioral:  Positive for sleep disturbance. Negative for dysphoric mood. The patient is nervous/anxious.        Prior to Visit Medications    Medication Sig Taking? Authorizing Provider   traZODone (DESYREL) 100 MG tablet  Yes ProviderJean MD   ondansetron (ZOFRAN) 4 MG tablet  Yes ProviderJean MD   ibuprofen (ADVIL;MOTRIN) 600 MG tablet  Yes ProviderJean MD   hydrOXYzine pamoate (VISTARIL) 25 MG capsule  Yes ProviderJean,

## 2024-04-30 NOTE — TELEPHONE ENCOUNTER
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  Please advise, 30-Apr-2024 09:13

## 2024-05-21 DIAGNOSIS — F31.9 BIPOLAR 1 DISORDER (HCC): ICD-10-CM

## 2024-05-21 DIAGNOSIS — M54.16 LUMBAR RADICULOPATHY: ICD-10-CM

## 2024-05-21 RX ORDER — BUSPIRONE HYDROCHLORIDE 15 MG/1
15 TABLET ORAL 3 TIMES DAILY PRN
Qty: 90 TABLET | Refills: 0 | Status: SHIPPED | OUTPATIENT
Start: 2024-05-21

## 2024-05-21 RX ORDER — GABAPENTIN 300 MG/1
300 CAPSULE ORAL 3 TIMES DAILY
Qty: 90 CAPSULE | Refills: 2 | Status: CANCELLED | OUTPATIENT
Start: 2024-05-21 | End: 2024-08-19

## 2024-05-21 RX ORDER — TRAZODONE HYDROCHLORIDE 150 MG/1
150 TABLET ORAL NIGHTLY
Status: CANCELLED | OUTPATIENT
Start: 2024-05-21

## 2024-05-21 RX ORDER — FLUVOXAMINE MALEATE 100 MG/1
CAPSULE, EXTENDED RELEASE ORAL
Qty: 30 CAPSULE | Status: CANCELLED | OUTPATIENT
Start: 2024-05-21

## 2024-05-21 NOTE — TELEPHONE ENCOUNTER
Ann-Marie called requesting a refill of the below medication which has been pended for you: advised patient after speaking with Dr Collier she needs to establish with psychiatry for Luvox, and Strattera. Advised patient she is not due for her Gabapentin until 6/12/2024. Patient states she has some Trazaodne and Vraylar left at home yet and will call for a refill when she needs them.    Requested Prescriptions     Pending Prescriptions Disp Refills    gabapentin (NEURONTIN) 300 MG capsule 90 capsule 2     Sig: Take 1 capsule by mouth 3 times daily for 90 days.    busPIRone (BUSPAR) 15 MG tablet 90 tablet 2     Sig: Take 15 mg by mouth 3 times daily as needed (anxiety)    fluvoxaMINE (LUVOX CR) 100 MG CP24 extended release capsule 30 capsule     traZODone (DESYREL) 150 MG tablet       Sig: Take 1 tablet by mouth nightly    cariprazine hcl (VRAYLAR) 3 MG CAPS capsule 30 capsule 3     Sig: Take 1 capsule by mouth daily       Last Appointment Date: 4/25/2024  Next Appointment Date: 06/24/2024    Allergies   Allergen Reactions    Latex      Powder in rubber gloves      Dust Mite Extract     Other      Pet dander    Pcn [Penicillins] Hives     Including Amoxicillin    Pollen Extract     Clindamycin/Lincomycin Nausea And Vomiting

## 2024-05-21 NOTE — TELEPHONE ENCOUNTER
Pt states she was put on 400mg 1 tab 3x daily at [facility], pt also requesting Strattera 40mg 1 daily,

## 2024-06-17 DIAGNOSIS — M54.16 LUMBAR RADICULOPATHY: ICD-10-CM

## 2024-06-17 RX ORDER — BUSPIRONE HYDROCHLORIDE 15 MG/1
15 TABLET ORAL 3 TIMES DAILY PRN
Qty: 90 TABLET | Refills: 0 | OUTPATIENT
Start: 2024-06-17

## 2024-06-17 RX ORDER — BUSPIRONE HYDROCHLORIDE 15 MG/1
15 TABLET ORAL 3 TIMES DAILY PRN
Qty: 90 TABLET | Refills: 0 | Status: SHIPPED | OUTPATIENT
Start: 2024-06-17

## 2024-06-17 RX ORDER — GABAPENTIN 300 MG/1
300 CAPSULE ORAL 3 TIMES DAILY
Qty: 90 CAPSULE | Refills: 0 | Status: SHIPPED | OUTPATIENT
Start: 2024-06-17 | End: 2024-09-15

## 2024-06-17 NOTE — TELEPHONE ENCOUNTER
Ann-Marie called requesting a refill of the below medication which has been pended for you:   Per [facility] last fill date was 04/22/2024 quantity 90 for 30 days  Requested Prescriptions     Pending Prescriptions Disp Refills    busPIRone (BUSPAR) 15 MG tablet 90 tablet 0     Sig: Take 15 mg by mouth 3 times daily as needed (anxiety)    gabapentin (NEURONTIN) 300 MG capsule 90 capsule 0     Sig: Take 1 capsule by mouth 3 times daily for 90 days.       Last Appointment Date: 4/25/2024  Next Appointment Date: 6/25/2024    Allergies   Allergen Reactions    Latex      Powder in rubber gloves      Dust Mite Extract     Other      Pet dander    Pcn [Penicillins] Hives     Including Amoxicillin    Pollen Extract     Clindamycin/Lincomycin Nausea And Vomiting

## 2024-08-12 DIAGNOSIS — M25.562 LEFT KNEE PAIN, UNSPECIFIED CHRONICITY: Primary | ICD-10-CM

## 2024-08-20 ENCOUNTER — OFFICE VISIT (OUTPATIENT)
Dept: ORTHOPEDIC SURGERY | Age: 39
End: 2024-08-20
Payer: COMMERCIAL

## 2024-08-20 ENCOUNTER — HOSPITAL ENCOUNTER (OUTPATIENT)
Dept: GENERAL RADIOLOGY | Age: 39
Discharge: HOME | End: 2024-08-22
Attending: ORTHOPAEDIC SURGERY
Payer: COMMERCIAL

## 2024-08-20 VITALS
DIASTOLIC BLOOD PRESSURE: 78 MMHG | BODY MASS INDEX: 30.82 KG/M2 | OXYGEN SATURATION: 95 % | HEIGHT: 65 IN | HEART RATE: 88 BPM | SYSTOLIC BLOOD PRESSURE: 116 MMHG | WEIGHT: 185 LBS

## 2024-08-20 DIAGNOSIS — M25.562 CHRONIC PAIN OF BOTH KNEES: Primary | ICD-10-CM

## 2024-08-20 DIAGNOSIS — G89.29 CHRONIC PAIN OF BOTH KNEES: Primary | ICD-10-CM

## 2024-08-20 DIAGNOSIS — M17.0 BILATERAL PRIMARY OSTEOARTHRITIS OF KNEE: ICD-10-CM

## 2024-08-20 DIAGNOSIS — M25.561 CHRONIC PAIN OF BOTH KNEES: Primary | ICD-10-CM

## 2024-08-20 DIAGNOSIS — M25.562 LEFT KNEE PAIN, UNSPECIFIED CHRONICITY: ICD-10-CM

## 2024-08-20 PROCEDURE — 20610 DRAIN/INJ JOINT/BURSA W/O US: CPT | Performed by: NURSE PRACTITIONER

## 2024-08-20 PROCEDURE — 73562 X-RAY EXAM OF KNEE 3: CPT

## 2024-08-20 PROCEDURE — 99204 OFFICE O/P NEW MOD 45 MIN: CPT | Performed by: NURSE PRACTITIONER

## 2024-08-20 RX ORDER — BUPIVACAINE HYDROCHLORIDE 5 MG/ML
5 INJECTION, SOLUTION PERINEURAL ONCE
Status: COMPLETED | OUTPATIENT
Start: 2024-08-20 | End: 2024-08-21

## 2024-08-20 RX ORDER — BUPIVACAINE HYDROCHLORIDE 5 MG/ML
5 INJECTION, SOLUTION PERINEURAL ONCE
Status: CANCELLED | OUTPATIENT
Start: 2024-08-20 | End: 2024-08-20

## 2024-08-20 RX ORDER — DULOXETIN HYDROCHLORIDE 30 MG/1
30 CAPSULE, DELAYED RELEASE ORAL DAILY
COMMUNITY

## 2024-08-20 RX ORDER — TRIAMCINOLONE ACETONIDE 40 MG/ML
80 INJECTION, SUSPENSION INTRA-ARTICULAR; INTRAMUSCULAR ONCE
Status: COMPLETED | OUTPATIENT
Start: 2024-08-20 | End: 2024-08-21

## 2024-08-20 RX ORDER — PREDNISONE 20 MG/1
20 TABLET ORAL 3 TIMES DAILY
COMMUNITY

## 2024-08-20 RX ORDER — ATOMOXETINE 60 MG/1
60 CAPSULE ORAL DAILY
COMMUNITY

## 2024-08-20 RX ORDER — BUPIVACAINE HYDROCHLORIDE 5 MG/ML
2 INJECTION, SOLUTION PERINEURAL ONCE
Status: SHIPPED | OUTPATIENT
Start: 2024-08-20

## 2024-08-20 NOTE — PROGRESS NOTES
Orthopaedic Progress Note      CHIEF COMPLAINT: Bilateral knee pain    HISTORY OF PRESENT ILLNESS:       Ms. Chavez  is a 38 y.o. female  who presents with bilateral knee pain.  Patient states she has been having increased left knee pain.  There is no specific injury.  She states over the past few months the pain has been worsening.  She does note that the knee wants to lock up on her at times.  She has been having stiffness and swelling.  She also notes pain in the right knee joint.  She states back in January she had a right knee injection at [facility].  Her x-rays did have some mild osteoarthritis.  She is here today requesting injections.  Patient states she has been noting increased pain to bilateral knees while sitting and flexing her knees.      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 Morales    MANDIBLE FRACTURE SURGERY      TONSILLECTOMY      TUBAL LIGATION      WISDOM TOOTH EXTRACTION           Current  Medications:  Current Outpatient Medications   Medication Sig Dispense Refill    atomoxetine (STRATTERA) 60 MG capsule Take 1 capsule by mouth daily      DULoxetine (CYMBALTA) 30 MG extended release capsule Take 1 capsule by mouth daily      predniSONE (DELTASONE) 20 MG tablet Take 1

## 2024-08-21 RX ADMIN — BUPIVACAINE HYDROCHLORIDE 25 MG: 5 INJECTION, SOLUTION PERINEURAL at 06:59

## 2024-08-21 RX ADMIN — TRIAMCINOLONE ACETONIDE 80 MG: 200 INJECTION, SUSPENSION INTRA-ARTICULAR; INTRAMUSCULAR at 07:01

## 2024-09-16 DIAGNOSIS — M79.671 RIGHT FOOT PAIN: Primary | ICD-10-CM

## 2024-09-30 ENCOUNTER — TELEPHONE (OUTPATIENT)
Dept: FAMILY MEDICINE CLINIC | Age: 39
End: 2024-09-30

## 2024-09-30 NOTE — TELEPHONE ENCOUNTER
Attempted to reach patient regarding missed appointment on 9/30/24. Unable to contact at this time. Unable to leave message. Letter mailed to reschedule.

## 2024-11-04 ENCOUNTER — TELEPHONE (OUTPATIENT)
Dept: FAMILY MEDICINE CLINIC | Age: 39
End: 2024-11-04

## 2024-11-04 NOTE — TELEPHONE ENCOUNTER
Spoke with patient and she stated the Dentist told her they were unsure how to give her an order for the IV antibiotic

## 2024-11-04 NOTE — TELEPHONE ENCOUNTER
Patient called and states she has been dealing with a dental infection.  She states she was been to urgent care and was given Clindamycin. She also is following with Dr. Carr at [facility] in [place]. She states she has had 5 teeth pulled however at her check up today with the dentist, they stated she needs IV anabiotics due to severe infection. She states they also prescribed a Zpack for her as well.   Patient would like to know what the next steps are for this.   No available appointments    Please advise patient.

## 2024-11-04 NOTE — TELEPHONE ENCOUNTER
Please clarify this with her and let her know that she does not need to be on IV antibiotics. Her zpack is enough

## 2024-11-04 NOTE — TELEPHONE ENCOUNTER
Patient made aware; patient stated she will contact office or come to urgent care if things get worse and the antibiotic does not help

## 2024-11-04 NOTE — TELEPHONE ENCOUNTER
Spoke with office and she stated it looked like patient was seen at their [facility] office on 11/1/2024 and 4 teeth were extracted. She was seen today in [place] and crowns were placed.   Patient was placed on an antibiotic due to a peridontals abscess that was on the lower right side. Stated that since patient is allergic to penicillin and amoxicillin and patient does not like clindamycin they gave patient a z-pack.   Office also stated she does not see anywhere in either note that IV antibiotics are needed or recommended. The only other thing that was in the note was the [facility] office wanted patient to be seen at [facility] dental clinic to be put under for another dental procedure that they are wanting completed

## 2025-01-14 ENCOUNTER — OFFICE VISIT (OUTPATIENT)
Dept: FAMILY MEDICINE CLINIC | Age: 40
End: 2025-01-14
Payer: COMMERCIAL

## 2025-01-14 VITALS
SYSTOLIC BLOOD PRESSURE: 124 MMHG | WEIGHT: 236.4 LBS | OXYGEN SATURATION: 99 % | HEIGHT: 65 IN | DIASTOLIC BLOOD PRESSURE: 82 MMHG | HEART RATE: 75 BPM | BODY MASS INDEX: 39.39 KG/M2

## 2025-01-14 DIAGNOSIS — Z13.220 SCREENING CHOLESTEROL LEVEL: ICD-10-CM

## 2025-01-14 DIAGNOSIS — J41.8 MIXED SIMPLE AND MUCOPURULENT CHRONIC BRONCHITIS (HCC): ICD-10-CM

## 2025-01-14 DIAGNOSIS — Z13.1 SCREENING FOR DIABETES MELLITUS: ICD-10-CM

## 2025-01-14 DIAGNOSIS — E66.812 CLASS 2 SEVERE OBESITY DUE TO EXCESS CALORIES WITH SERIOUS COMORBIDITY AND BODY MASS INDEX (BMI) OF 39.0 TO 39.9 IN ADULT: Primary | ICD-10-CM

## 2025-01-14 DIAGNOSIS — E66.01 CLASS 2 SEVERE OBESITY DUE TO EXCESS CALORIES WITH SERIOUS COMORBIDITY AND BODY MASS INDEX (BMI) OF 39.0 TO 39.9 IN ADULT: Primary | ICD-10-CM

## 2025-01-14 DIAGNOSIS — F17.290 OTHER TOBACCO PRODUCT NICOTINE DEPENDENCE, UNCOMPLICATED: ICD-10-CM

## 2025-01-14 DIAGNOSIS — R63.5 WEIGHT GAIN: ICD-10-CM

## 2025-01-14 DIAGNOSIS — K64.9 HEMORRHOIDS, UNSPECIFIED HEMORRHOID TYPE: ICD-10-CM

## 2025-01-14 DIAGNOSIS — Z12.31 ENCOUNTER FOR SCREENING MAMMOGRAM FOR MALIGNANT NEOPLASM OF BREAST: ICD-10-CM

## 2025-01-14 PROCEDURE — G8427 DOCREV CUR MEDS BY ELIG CLIN: HCPCS | Performed by: FAMILY MEDICINE

## 2025-01-14 PROCEDURE — 99214 OFFICE O/P EST MOD 30 MIN: CPT | Performed by: FAMILY MEDICINE

## 2025-01-14 PROCEDURE — G8417 CALC BMI ABV UP PARAM F/U: HCPCS | Performed by: FAMILY MEDICINE

## 2025-01-14 PROCEDURE — 3023F SPIROM DOC REV: CPT | Performed by: FAMILY MEDICINE

## 2025-01-14 PROCEDURE — 1036F TOBACCO NON-USER: CPT | Performed by: FAMILY MEDICINE

## 2025-01-14 RX ORDER — BENZOCAINE/MENTHOL 6 MG-10 MG
LOZENGE MUCOUS MEMBRANE
Qty: 30 G | Refills: 1 | Status: SHIPPED | OUTPATIENT
Start: 2025-01-14 | End: 2025-01-21

## 2025-01-14 RX ORDER — ALBUTEROL SULFATE 90 UG/1
2 INHALANT RESPIRATORY (INHALATION) EVERY 4 HOURS PRN
Qty: 18 G | Refills: 0 | Status: SHIPPED | OUTPATIENT
Start: 2025-01-14

## 2025-01-14 RX ORDER — HYDROCORTISONE ACETATE 25 MG/1
25 SUPPOSITORY RECTAL 2 TIMES DAILY PRN
Qty: 2 SUPPOSITORY | Refills: 0 | Status: SHIPPED | OUTPATIENT
Start: 2025-01-14

## 2025-01-14 RX ORDER — NICOTINE 21 MG/24HR
1 PATCH, TRANSDERMAL 24 HOURS TRANSDERMAL DAILY
Qty: 42 PATCH | Refills: 0 | Status: SHIPPED | OUTPATIENT
Start: 2025-01-14 | End: 2025-02-25

## 2025-01-14 SDOH — ECONOMIC STABILITY: FOOD INSECURITY: WITHIN THE PAST 12 MONTHS, THE FOOD YOU BOUGHT JUST DIDN'T LAST AND YOU DIDN'T HAVE MONEY TO GET MORE.: NEVER TRUE

## 2025-01-14 SDOH — ECONOMIC STABILITY: FOOD INSECURITY: WITHIN THE PAST 12 MONTHS, YOU WORRIED THAT YOUR FOOD WOULD RUN OUT BEFORE YOU GOT MONEY TO BUY MORE.: NEVER TRUE

## 2025-01-14 ASSESSMENT — ENCOUNTER SYMPTOMS
CHEST TIGHTNESS: 0
BACK PAIN: 1
WHEEZING: 0
COUGH: 0
SHORTNESS OF BREATH: 0

## 2025-01-14 NOTE — ASSESSMENT & PLAN NOTE
Orders:    witch hazel-glycerin (TUCKS) pad; Place rectally as needed.    hydrocortisone (ALA-WESLY) 1 % cream; Apply topically 2 times daily.    hydrocortisone (ANUSOL-HC) 25 MG suppository; Place 1 suppository rectally 2 times daily as needed for Hemorrhoids

## 2025-01-14 NOTE — ASSESSMENT & PLAN NOTE
Orders:    albuterol sulfate HFA (PROVENTIL;VENTOLIN;PROAIR) 108 (90 Base) MCG/ACT inhaler; Inhale 2 puffs into the lungs every 4 hours as needed for Wheezing or Shortness of Breath

## 2025-01-14 NOTE — PROGRESS NOTES
MD   1/14/2025  5:31 PM    The patient (or guardian, if applicable) and other individuals in attendance with the patient were advised that Artificial Intelligence will be utilized during this visit to record, process the conversation to generate a clinical note, and support improvement of the AI technology. The patient (or guardian, if applicable) and other individuals in attendance at the appointment consented to the use of AI, including the recording.

## 2025-01-16 ENCOUNTER — TELEPHONE (OUTPATIENT)
Dept: FAMILY MEDICINE CLINIC | Age: 40
End: 2025-01-16

## 2025-01-16 NOTE — TELEPHONE ENCOUNTER
Left patient a voicemail to call back to verify her insurance. If it is [place] [employer] they do not cover weight loss medications

## 2025-01-16 NOTE — TELEPHONE ENCOUNTER
Yes patient does have [place] Medicaid and I made her aware they will not cover injectable weight loss medications at this time.

## 2025-01-16 NOTE — TELEPHONE ENCOUNTER
Pt states she was prescribed Wegovy and pharmacy is waiting on us for the prior auth, please advise pt of status.

## 2025-02-07 ENCOUNTER — HOSPITAL ENCOUNTER (OUTPATIENT)
Age: 40
Discharge: HOME | End: 2025-02-07
Attending: FAMILY MEDICINE
Payer: COMMERCIAL

## 2025-02-07 ENCOUNTER — HOSPITAL ENCOUNTER (OUTPATIENT)
Dept: MAMMOGRAPHY | Age: 40
Discharge: HOME | End: 2025-02-09
Attending: FAMILY MEDICINE
Payer: COMMERCIAL

## 2025-02-07 VITALS — HEIGHT: 65 IN | WEIGHT: 240 LBS | BODY MASS INDEX: 39.99 KG/M2

## 2025-02-07 DIAGNOSIS — Z13.220 SCREENING CHOLESTEROL LEVEL: ICD-10-CM

## 2025-02-07 DIAGNOSIS — R63.5 WEIGHT GAIN: ICD-10-CM

## 2025-02-07 DIAGNOSIS — Z13.1 SCREENING FOR DIABETES MELLITUS: ICD-10-CM

## 2025-02-07 DIAGNOSIS — Z12.31 ENCOUNTER FOR SCREENING MAMMOGRAM FOR MALIGNANT NEOPLASM OF BREAST: ICD-10-CM

## 2025-02-07 LAB
ALBUMIN SERPL-MCNC: 4.1 G/DL (ref 3.5–5.2)
ALBUMIN/GLOB SERPL: 1.1 {RATIO} (ref 1–2.5)
ALP SERPL-CCNC: 124 U/L (ref 35–104)
ALT SERPL-CCNC: 20 U/L (ref 5–33)
ANION GAP SERPL CALCULATED.3IONS-SCNC: 10 MMOL/L (ref 9–17)
AST SERPL-CCNC: 20 U/L
BILIRUB SERPL-MCNC: 0.3 MG/DL (ref 0.3–1.2)
BUN SERPL-MCNC: 12 MG/DL (ref 6–20)
BUN/CREAT SERPL: 15 (ref 9–20)
CALCIUM SERPL-MCNC: 9 MG/DL (ref 8.6–10.4)
CHLORIDE SERPL-SCNC: 102 MMOL/L (ref 98–107)
CHOLEST SERPL-MCNC: 170 MG/DL (ref 0–199)
CHOLESTEROL/HDL RATIO: 3.8
CO2 SERPL-SCNC: 26 MMOL/L (ref 20–31)
CREAT SERPL-MCNC: 0.8 MG/DL (ref 0.5–0.9)
EST. AVERAGE GLUCOSE BLD GHB EST-MCNC: 105 MG/DL
GFR, ESTIMATED: >90 ML/MIN/1.73M2
GLUCOSE SERPL-MCNC: 84 MG/DL (ref 70–99)
HBA1C MFR BLD: 5.3 % (ref 4–6)
HDLC SERPL-MCNC: 45 MG/DL
LDLC SERPL CALC-MCNC: 105 MG/DL (ref 0–100)
POTASSIUM SERPL-SCNC: 4.1 MMOL/L (ref 3.7–5.3)
PROT SERPL-MCNC: 7.7 G/DL (ref 6.4–8.3)
SODIUM SERPL-SCNC: 138 MMOL/L (ref 135–144)
TRIGL SERPL-MCNC: 102 MG/DL
TSH SERPL DL<=0.05 MIU/L-ACNC: 1.57 UIU/ML (ref 0.3–5)
VLDLC SERPL CALC-MCNC: 20 MG/DL (ref 1–30)

## 2025-02-07 PROCEDURE — 83036 HEMOGLOBIN GLYCOSYLATED A1C: CPT

## 2025-02-07 PROCEDURE — 77063 BREAST TOMOSYNTHESIS BI: CPT

## 2025-02-07 PROCEDURE — 80053 COMPREHEN METABOLIC PANEL: CPT

## 2025-02-07 PROCEDURE — 80061 LIPID PANEL: CPT

## 2025-02-07 PROCEDURE — 84443 ASSAY THYROID STIM HORMONE: CPT

## 2025-02-07 PROCEDURE — 36415 COLL VENOUS BLD VENIPUNCTURE: CPT

## 2025-02-10 ENCOUNTER — PATIENT MESSAGE (OUTPATIENT)
Dept: FAMILY MEDICINE CLINIC | Age: 40
End: 2025-02-10

## 2025-04-01 ENCOUNTER — TELEPHONE (OUTPATIENT)
Dept: FAMILY MEDICINE CLINIC | Age: 40
End: 2025-04-01

## 2025-04-01 ENCOUNTER — OFFICE VISIT (OUTPATIENT)
Dept: FAMILY MEDICINE CLINIC | Age: 40
End: 2025-04-01

## 2025-04-01 VITALS
HEART RATE: 55 BPM | OXYGEN SATURATION: 98 % | WEIGHT: 237.7 LBS | BODY MASS INDEX: 39.6 KG/M2 | HEIGHT: 65 IN | SYSTOLIC BLOOD PRESSURE: 122 MMHG | DIASTOLIC BLOOD PRESSURE: 78 MMHG

## 2025-04-01 DIAGNOSIS — M54.41 CHRONIC MIDLINE LOW BACK PAIN WITH BILATERAL SCIATICA: ICD-10-CM

## 2025-04-01 DIAGNOSIS — L40.9 SCALP PSORIASIS: Primary | ICD-10-CM

## 2025-04-01 DIAGNOSIS — E66.01 CLASS 2 SEVERE OBESITY DUE TO EXCESS CALORIES WITH SERIOUS COMORBIDITY AND BODY MASS INDEX (BMI) OF 39.0 TO 39.9 IN ADULT: ICD-10-CM

## 2025-04-01 DIAGNOSIS — F31.9 BIPOLAR 1 DISORDER (HCC): ICD-10-CM

## 2025-04-01 DIAGNOSIS — E66.812 CLASS 2 SEVERE OBESITY DUE TO EXCESS CALORIES WITH SERIOUS COMORBIDITY AND BODY MASS INDEX (BMI) OF 39.0 TO 39.9 IN ADULT: ICD-10-CM

## 2025-04-01 DIAGNOSIS — M54.42 CHRONIC MIDLINE LOW BACK PAIN WITH BILATERAL SCIATICA: ICD-10-CM

## 2025-04-01 DIAGNOSIS — G89.29 CHRONIC MIDLINE LOW BACK PAIN WITH BILATERAL SCIATICA: ICD-10-CM

## 2025-04-01 PROCEDURE — 99214 OFFICE O/P EST MOD 30 MIN: CPT | Performed by: FAMILY MEDICINE

## 2025-04-01 RX ORDER — CLOBETASOL PROPIONATE 0.05 G/100ML
SHAMPOO TOPICAL
Qty: 118 ML | Refills: 3 | Status: SHIPPED | OUTPATIENT
Start: 2025-04-01

## 2025-04-01 ASSESSMENT — ENCOUNTER SYMPTOMS
CONSTIPATION: 0
COUGH: 0
SHORTNESS OF BREATH: 0
CHEST TIGHTNESS: 0
DIARRHEA: 0
BACK PAIN: 1
WHEEZING: 0
ABDOMINAL PAIN: 0

## 2025-04-01 ASSESSMENT — PATIENT HEALTH QUESTIONNAIRE - PHQ9
2. FEELING DOWN, DEPRESSED OR HOPELESS: SEVERAL DAYS
5. POOR APPETITE OR OVEREATING: MORE THAN HALF THE DAYS
9. THOUGHTS THAT YOU WOULD BE BETTER OFF DEAD, OR OF HURTING YOURSELF: SEVERAL DAYS
7. TROUBLE CONCENTRATING ON THINGS, SUCH AS READING THE NEWSPAPER OR WATCHING TELEVISION: MORE THAN HALF THE DAYS
8. MOVING OR SPEAKING SO SLOWLY THAT OTHER PEOPLE COULD HAVE NOTICED. OR THE OPPOSITE, BEING SO FIGETY OR RESTLESS THAT YOU HAVE BEEN MOVING AROUND A LOT MORE THAN USUAL: SEVERAL DAYS
SUM OF ALL RESPONSES TO PHQ QUESTIONS 1-9: 13
1. LITTLE INTEREST OR PLEASURE IN DOING THINGS: SEVERAL DAYS
6. FEELING BAD ABOUT YOURSELF - OR THAT YOU ARE A FAILURE OR HAVE LET YOURSELF OR YOUR FAMILY DOWN: SEVERAL DAYS
SUM OF ALL RESPONSES TO PHQ QUESTIONS 1-9: 12
3. TROUBLE FALLING OR STAYING ASLEEP: MORE THAN HALF THE DAYS
SUM OF ALL RESPONSES TO PHQ QUESTIONS 1-9: 13
10. IF YOU CHECKED OFF ANY PROBLEMS, HOW DIFFICULT HAVE THESE PROBLEMS MADE IT FOR YOU TO DO YOUR WORK, TAKE CARE OF THINGS AT HOME, OR GET ALONG WITH OTHER PEOPLE: VERY DIFFICULT
4. FEELING TIRED OR HAVING LITTLE ENERGY: MORE THAN HALF THE DAYS
SUM OF ALL RESPONSES TO PHQ QUESTIONS 1-9: 13

## 2025-04-01 NOTE — PROGRESS NOTES
[facility]  [address] [facility] [address]  Dept: [phone]  Dept Fax: [phone]  Loc: [phone]    Ann-Marie Chavez is a 39 y.o. female who presents today for her medical conditions/complaints as noted below.  Ann-Marie Chavez is c/o of   Chief Complaint   Patient presents with    Other     Discuss psoriasis;  Discuss muscle cramps       HPI:     History of Present Illness  The patient is a 39-year-old female who presents to discuss psoriasis and muscle cramping. Of note, [facility] a severe episode of [facility].    Significant stress has been experienced due to her living situation, work commitments, and personal issues, particularly with her son who has not communicated with her for nearly 2 years. This stress culminated in a major depressive episode, leading to her admission to the [facility] where Ativan was administered. She is now on a behavioral contract at her sober living house because she took ativan in the hospital. She reports feeling punished for seeking help and expresses frustration over her inability to take gabapentin, which she believes would alleviate her back pain. An upcoming [facility] is scheduled for tomorrow. Vraylar has been effective, but manic symptoms were experienced for a few days post-discharge from the [facility]. Sleep disturbances are reported, averaging only 4 to 5 hours of sleep per night. She continues to smoke and was not prescribed any new medications upon discharge from the [facility]. Luvox has been discontinued.    Psoriasis has developed on her scalp, managed with a generic form of T-Gel shampoo. No other dry spots on her body are reported. Head and Shoulders and tea tree oil were tried but exacerbated her symptoms.    A history of 2 back surgeries is noted, and she currently works 2 jobs, one of which

## 2025-04-01 NOTE — TELEPHONE ENCOUNTER
Pt calling as she was seen and prescribe Wegovy and pharmacy told her it needs a prior auth and we need to call [phone]

## 2025-04-11 ENCOUNTER — OFFICE VISIT (OUTPATIENT)
Dept: PRIMARY CARE CLINIC | Age: 40
End: 2025-04-11
Payer: COMMERCIAL

## 2025-04-11 VITALS
RESPIRATION RATE: 16 BRPM | HEART RATE: 92 BPM | DIASTOLIC BLOOD PRESSURE: 70 MMHG | WEIGHT: 235 LBS | OXYGEN SATURATION: 97 % | TEMPERATURE: 98.7 F | HEIGHT: 65 IN | SYSTOLIC BLOOD PRESSURE: 100 MMHG | BODY MASS INDEX: 39.15 KG/M2

## 2025-04-11 DIAGNOSIS — F41.9 ANXIETY: Primary | ICD-10-CM

## 2025-04-11 PROCEDURE — 99213 OFFICE O/P EST LOW 20 MIN: CPT

## 2025-04-11 ASSESSMENT — PATIENT HEALTH QUESTIONNAIRE - PHQ9: DEPRESSION UNABLE TO ASSESS: PT REFUSES

## 2025-04-11 ASSESSMENT — ENCOUNTER SYMPTOMS: SHORTNESS OF BREATH: 1

## 2025-04-11 NOTE — PROGRESS NOTES
[facility] Walk In department of [facility]  [address] [facility] [address]  Phone: [phone]  Fax: [phone]      Ann-Marie Chavez  1985  MRN: [id number]  Date of visit: 4/11/2025    Chief Complaint:     Ann-Marie Chavez is here for c/o of Panic Attack (Pt reports increased stress, decreased sleep and increased anxiety this past month, was seen at [facility] a couple weeks ago without much improvement. Pt had a panic attack driving to work today that last 10-15 min with heart palpitations, shortness of breath and bilat hand tingling. Follows with [facility] in [place]. )      HPI:     Ann-Marie Chavez is a 39 y.o. female who [facility] to the [facility] today for her medical conditions/complaints as noted below.    History of Present Illness  The patient is a 39-year-old female who presents today with increased stress and anxiety over the past month. She has had decreased sleep. She was in the [facility] a couple of weeks ago and did not improve very much. Today, she presents because she had a panic attack while driving to work, which lasted about 10 to 15 minutes with heart palpitations, shortness of breath, and hand tingling. She follows with [facility] in [place] to manage her medications.    She reports experiencing heightened levels of anxiety and panic attacks, which have been exacerbated by her current living situation. 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 This situation has negatively impacted her sleep quality, leaving her feeling fatigued and stressed. She is aware of the existence of women's shelters but is hesitant to utilize them due to their [employer] affiliations. Despite having the financial means to stay in a hotel, she is reluctant to do so as it would delay her ability to save for her own accommodation. She is

## 2025-04-11 NOTE — PATIENT INSTRUCTIONS
Follow up with PCP or recovery center  Reach out to us if you need resources for housing  Go to ER for chest pain, palpitations, shortness of breath  Encouraged patient to follow up with PCP or return to the clinic for no improvement and or worsening of symptoms.

## 2025-05-19 DIAGNOSIS — M54.16 LUMBAR RADICULOPATHY: ICD-10-CM

## 2025-05-19 DIAGNOSIS — M54.41 CHRONIC MIDLINE LOW BACK PAIN WITH BILATERAL SCIATICA: ICD-10-CM

## 2025-05-19 DIAGNOSIS — M54.42 CHRONIC MIDLINE LOW BACK PAIN WITH BILATERAL SCIATICA: ICD-10-CM

## 2025-05-19 DIAGNOSIS — G89.29 CHRONIC MIDLINE LOW BACK PAIN WITH BILATERAL SCIATICA: ICD-10-CM

## 2025-05-19 RX ORDER — GABAPENTIN 300 MG/1
300 CAPSULE ORAL 3 TIMES DAILY
Qty: 90 CAPSULE | Refills: 0 | Status: SHIPPED | OUTPATIENT
Start: 2025-05-19 | End: 2025-08-17

## 2025-05-19 NOTE — TELEPHONE ENCOUNTER
Ann-Marie called requesting a refill of the below medication which has been pended for you:     Requested Prescriptions     Pending Prescriptions Disp Refills    gabapentin (NEURONTIN) 300 MG capsule 90 capsule 0     Sig: Take 1 capsule by mouth 3 times daily for 90 days.    tiZANidine (ZANAFLEX) 4 MG tablet 30 tablet 1     Sig: Take 1 tablet by mouth 3 times daily as needed (muscle spasms)       Last Appointment Date: 4/1/2025  Next Appointment Date: 6/30/2025    Allergies   Allergen Reactions    Latex      Powder in rubber gloves      Dust Mite Extract     Other/Food      Pet dander    Pcn [Penicillins] Hives     Including Amoxicillin    Pollen Extract     Clindamycin/Lincomycin Nausea And Vomiting

## 2025-06-05 ENCOUNTER — OFFICE VISIT (OUTPATIENT)
Dept: FAMILY MEDICINE CLINIC | Age: 40
End: 2025-06-05

## 2025-06-05 VITALS
DIASTOLIC BLOOD PRESSURE: 68 MMHG | WEIGHT: 233.2 LBS | HEIGHT: 65 IN | BODY MASS INDEX: 38.85 KG/M2 | HEART RATE: 76 BPM | OXYGEN SATURATION: 95 % | SYSTOLIC BLOOD PRESSURE: 124 MMHG

## 2025-06-05 DIAGNOSIS — G89.29 CHRONIC MIDLINE LOW BACK PAIN WITH BILATERAL SCIATICA: Primary | ICD-10-CM

## 2025-06-05 DIAGNOSIS — N30.00 ACUTE CYSTITIS WITHOUT HEMATURIA: ICD-10-CM

## 2025-06-05 DIAGNOSIS — F31.9 BIPOLAR 1 DISORDER (HCC): ICD-10-CM

## 2025-06-05 DIAGNOSIS — F19.11 SUBSTANCE ABUSE IN REMISSION (HCC): ICD-10-CM

## 2025-06-05 DIAGNOSIS — M54.41 CHRONIC MIDLINE LOW BACK PAIN WITH BILATERAL SCIATICA: Primary | ICD-10-CM

## 2025-06-05 DIAGNOSIS — M54.42 CHRONIC MIDLINE LOW BACK PAIN WITH BILATERAL SCIATICA: Primary | ICD-10-CM

## 2025-06-05 DIAGNOSIS — M54.16 LUMBAR RADICULOPATHY: ICD-10-CM

## 2025-06-05 RX ORDER — GABAPENTIN 400 MG/1
400 CAPSULE ORAL 3 TIMES DAILY
Qty: 90 CAPSULE | Refills: 0 | Status: SHIPPED | OUTPATIENT
Start: 2025-06-05 | End: 2025-09-03

## 2025-06-05 RX ORDER — CEPHALEXIN 500 MG/1
500 CAPSULE ORAL EVERY 8 HOURS
COMMUNITY
Start: 2025-06-03

## 2025-06-05 RX ORDER — PREDNISONE 20 MG/1
20 TABLET ORAL 2 TIMES DAILY
COMMUNITY
Start: 2025-06-03

## 2025-06-05 ASSESSMENT — ENCOUNTER SYMPTOMS
BACK PAIN: 1
WHEEZING: 0
CHEST TIGHTNESS: 0
COUGH: 0
SHORTNESS OF BREATH: 0

## 2025-06-05 NOTE — PATIENT INSTRUCTIONS
[facility]   Address: [address] [place] [address], [place] [address]  Hours: Open ? Closes 7:30?PM  Phone: [phone]

## 2025-06-05 NOTE — PROGRESS NOTES
[facility] - Jeremy Walton, , Neurosurgery, [place]    tiZANidine (ZANAFLEX) 4 MG tablet      2. Acute cystitis without hematuria        3. Bipolar 1 disorder ([facility])  cariprazine hcl (VRAYLAR) 4.5 MG CAPS capsule      4. Substance abuse in remission ([facility])        5. Lumbar radiculopathy  gabapentin (NEURONTIN) 400 MG capsule              Results  Labs   - Urine culture: Sensitivity to Keflex    Imaging   - CT scan of the back: Rods are still in place and screws are good    Plan:   Assessment & Plan   Assessment & Plan  1. Low back pain.  - Experienced severe low back pain after bending over to retrieve clothes from the dryer.  - Difficulty sitting up from a lying position and numbness and tingling in the right leg.  - Referral to a neurosurgeon has been initiated for further evaluation.  - Work release note will be provided releasing her back to work.    2. Urinary tract infection.  - Diagnosed with a UTI during her ER visit and started on Keflex, which she is sensitive to.  - Reports feeling like something is blocking towards the end of urination, potentially related to nerve issues from back pain.  - Advised to continue and complete the course of Keflex.    3. Bipolar disorder  - Requested refills for current medications, including Vraylar 4.5 mg   - Prescriptions for this medication has been sent to the pharmacy.  - she was advised to find a psychiatrist asap    4. Substance abuse  - I told her I would not send in any narcotics due to her history of drug abuse    5. Weight management.  - Has not had a period since 03/2025 and is experiencing increased facial hair and dry hair.  - Despite appearing to have lost some weight, she still weighs 230 pounds.  - Issues with insurance coverage for Wegovy at the pharmacy; advised to contact the insurance company for clarification.  - she is currently starting perimenopause. If symptoms worsen I will start her on estrogen    Return in about 3 months (around 9/5/2025) for

## 2025-06-19 ENCOUNTER — HOSPITAL ENCOUNTER (OUTPATIENT)
Dept: MRI IMAGING | Age: 40
Discharge: HOME | End: 2025-06-21
Payer: COMMERCIAL

## 2025-06-19 ENCOUNTER — OFFICE VISIT (OUTPATIENT)
Dept: NEUROSURGERY | Age: 40
End: 2025-06-19
Payer: COMMERCIAL

## 2025-06-19 VITALS — SYSTOLIC BLOOD PRESSURE: 116 MMHG | HEART RATE: 60 BPM | DIASTOLIC BLOOD PRESSURE: 89 MMHG

## 2025-06-19 DIAGNOSIS — R32 URINARY INCONTINENCE, UNSPECIFIED TYPE: ICD-10-CM

## 2025-06-19 DIAGNOSIS — M54.42 ACUTE BILATERAL LOW BACK PAIN WITH BILATERAL SCIATICA: ICD-10-CM

## 2025-06-19 DIAGNOSIS — G83.4 CAUDA EQUINA SYNDROME (HCC): ICD-10-CM

## 2025-06-19 DIAGNOSIS — G83.4 CAUDA EQUINA SYNDROME (HCC): Primary | ICD-10-CM

## 2025-06-19 DIAGNOSIS — M54.41 ACUTE BILATERAL LOW BACK PAIN WITH BILATERAL SCIATICA: ICD-10-CM

## 2025-06-19 DIAGNOSIS — Z98.1 HISTORY OF LUMBAR FUSION: ICD-10-CM

## 2025-06-19 PROCEDURE — 72148 MRI LUMBAR SPINE W/O DYE: CPT

## 2025-06-19 PROCEDURE — 99204 OFFICE O/P NEW MOD 45 MIN: CPT | Performed by: NURSE PRACTITIONER

## 2025-06-19 RX ORDER — PREDNISONE 20 MG/1
TABLET ORAL
Qty: 30 TABLET | Refills: 0 | Status: SHIPPED | OUTPATIENT
Start: 2025-06-19 | End: 2025-07-04

## 2025-06-19 NOTE — PROGRESS NOTES
[facility] NEUROSURGERY [place] [address] [facility]  [address]  [address]LO[address], [address]  [facility] [address]  Dept: [phone]    Patient:  Ann-Marie Chavez  YOB: 1985  Date: 6/19/25    The patient is a 39 y.o. female who presents today for consult of the following problems:     Chief Complaint   Patient presents with    New Patient     Previous surgery by Dr. Huston - 2 fusions          HPI:     Ann-Marie Chavez is a 39 y.o. female on whom neurosurgical consultation was requested by Vanna Collier MD for management of back pain. States that she was doing laundry 3 weeks ago and felt a popping sensation. Was evaluated in an outside ED, was treated with some relief for a few days. Had a coughing spell that re-triggered the pain. Pain on average has been 8/10, described as spasmodic. Reports that she has had some episodes in the last few weeks of bladder incontinence, sometimes due to being unable to get to the bathroom, other times will have overt leakage.  This is not something that she has experienced in the past even prior to previous surgeries.  [place]yon any saddle anesthesia, no changes to bowels. Has tried prednisone, diclofenac, ibuprofen, muscle relaxer, tylenol, gabapentin. Steroids do seem to help.     Has had 2 prior surgeries, last one in 2022, L3-L5 fusion.    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

## 2025-06-20 ENCOUNTER — RESULTS FOLLOW-UP (OUTPATIENT)
Dept: NEUROSURGERY | Age: 40
End: 2025-06-20

## 2025-06-20 DIAGNOSIS — M47.816 LUMBAR SPONDYLOSIS: Primary | ICD-10-CM

## 2025-06-20 DIAGNOSIS — M47.819 FACET ARTHROPATHY: ICD-10-CM

## 2025-06-27 ENCOUNTER — TELEPHONE (OUTPATIENT)
Dept: NEUROSURGERY | Age: 40
End: 2025-06-27

## 2025-06-27 DIAGNOSIS — M54.42 ACUTE BILATERAL LOW BACK PAIN WITH BILATERAL SCIATICA: Primary | ICD-10-CM

## 2025-06-27 DIAGNOSIS — M54.41 ACUTE BILATERAL LOW BACK PAIN WITH BILATERAL SCIATICA: Primary | ICD-10-CM

## 2025-06-27 RX ORDER — OXYCODONE AND ACETAMINOPHEN 5; 325 MG/1; MG/1
1 TABLET ORAL EVERY 12 HOURS PRN
Qty: 14 TABLET | Refills: 0 | Status: SHIPPED | OUTPATIENT
Start: 2025-06-27 | End: 2025-07-03 | Stop reason: SDUPTHER

## 2025-06-27 NOTE — TELEPHONE ENCOUNTER
Patient called in updating that she can not get into Pain Management until August 1st. Benji alonso she has been missing out on work ue to worsening pain and she cried on the phone in worry she may not be able to walk again because her legs keep locking up. Want's to know is there anyhing that can be prescribed until she is seen by Pain Management 8/1/2025.        She also does not have a follow up schedule is it still AS NEEDED?

## 2025-06-27 NOTE — TELEPHONE ENCOUNTER
Patient called in tears reporting horrific pain unable to walk describe nerves being ripped out of her legs and hip. She is scared she does not want it to be permanent. She is taking the Prednisone, Gabapentin, Ibuprofen PRN.     She is scheduled to follow up 7/3/025 and willing to complete xray prior.

## 2025-07-03 ENCOUNTER — OFFICE VISIT (OUTPATIENT)
Dept: NEUROSURGERY | Age: 40
End: 2025-07-03
Payer: COMMERCIAL

## 2025-07-03 ENCOUNTER — HOSPITAL ENCOUNTER (OUTPATIENT)
Dept: GENERAL RADIOLOGY | Age: 40
Discharge: HOME | End: 2025-07-05
Payer: COMMERCIAL

## 2025-07-03 VITALS
SYSTOLIC BLOOD PRESSURE: 124 MMHG | HEIGHT: 65 IN | HEART RATE: 100 BPM | DIASTOLIC BLOOD PRESSURE: 90 MMHG | BODY MASS INDEX: 38.81 KG/M2

## 2025-07-03 DIAGNOSIS — G83.4 CAUDA EQUINA SYNDROME (HCC): ICD-10-CM

## 2025-07-03 DIAGNOSIS — M47.819 FACET ARTHROPATHY: ICD-10-CM

## 2025-07-03 DIAGNOSIS — G89.29 ACUTE EXACERBATION OF CHRONIC LOW BACK PAIN: Primary | ICD-10-CM

## 2025-07-03 DIAGNOSIS — Z98.1 HISTORY OF LUMBAR FUSION: ICD-10-CM

## 2025-07-03 DIAGNOSIS — M54.50 ACUTE EXACERBATION OF CHRONIC LOW BACK PAIN: Primary | ICD-10-CM

## 2025-07-03 DIAGNOSIS — M47.816 LUMBAR SPONDYLOSIS: ICD-10-CM

## 2025-07-03 PROCEDURE — 1036F TOBACCO NON-USER: CPT | Performed by: NURSE PRACTITIONER

## 2025-07-03 PROCEDURE — 99214 OFFICE O/P EST MOD 30 MIN: CPT | Performed by: NURSE PRACTITIONER

## 2025-07-03 PROCEDURE — G8427 DOCREV CUR MEDS BY ELIG CLIN: HCPCS | Performed by: NURSE PRACTITIONER

## 2025-07-03 PROCEDURE — G8417 CALC BMI ABV UP PARAM F/U: HCPCS | Performed by: NURSE PRACTITIONER

## 2025-07-03 PROCEDURE — 72110 X-RAY EXAM L-2 SPINE 4/>VWS: CPT

## 2025-07-03 RX ORDER — OXYCODONE AND ACETAMINOPHEN 5; 325 MG/1; MG/1
1 TABLET ORAL EVERY 12 HOURS PRN
Qty: 14 TABLET | Refills: 0 | Status: SHIPPED | OUTPATIENT
Start: 2025-07-05 | End: 2025-07-12

## 2025-07-03 RX ORDER — CYCLOBENZAPRINE HCL 10 MG
TABLET ORAL
COMMUNITY
Start: 2025-06-23

## 2025-07-03 NOTE — PROGRESS NOTES
[facility] NEUROSURGERY [place] [id number] [facility] [address]  [address], [address]  [facility] [address]  Dept: [phone]    Patient:  Ann-Marie Chavez  YOB: 1985  Date: 7/3/25    The patient is a 39 y.o. female who presents today for consult of the following problems:     Chief Complaint   Patient presents with    Back Pain    Hip Pain    Leg Pain         HPI:     Ann-Marie Chavez is a 39 y.o. female who presents for follow up of back and leg pain. Was able to complete MRI to rule out cauda equina compression. Still with significant lower back and right leg pain. Having bladder/bowel movements are uncomfortable, due to needed to strain and it worsening back pain. Overt incontinence seems improved. Pain still very severe, interfering with work and all daily activities. The more active, the worse the pain. Starts PT next week. Upcoming appt to establish with pain management. Unsure if prednisone helped much. Pain medication, gabapentin, tizanidine with modest relief.     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 Morales    [facility] STEREO BREAST BX W LOC DEVICE 1ST LESION RIGHT Right 2018    MANDIBLE FRACTURE SURGERY

## 2025-07-12 DIAGNOSIS — M54.16 LUMBAR RADICULOPATHY: ICD-10-CM

## 2025-07-14 NOTE — TELEPHONE ENCOUNTER
Ann-Marie called requesting a refill of the below medication which has been pended for you:   Per [facility] last fill date was 06/18/2025 quantity 90 for 30 days  Requested Prescriptions     Pending Prescriptions Disp Refills    gabapentin (NEURONTIN) 400 MG capsule [Pharmacy Med Name: Gabapentin 400 MG Oral Capsule] 90 capsule 0     Sig: TAKE 1 CAPSULE BY MOUTH THREE TIMES DAILY FOR 90 DAYS       Last Appointment Date: 6/5/2025  Next Appointment Date: 8/8/2025    Allergies   Allergen Reactions    Latex      Powder in rubber gloves      Dust Mite Extract     Other      Pet dander    Pcn [Penicillins] Hives     Including Amoxicillin    Pollen Extract     Clindamycin/Lincomycin Nausea And Vomiting

## 2025-07-16 RX ORDER — GABAPENTIN 400 MG/1
CAPSULE ORAL
Qty: 90 CAPSULE | Refills: 0 | Status: SHIPPED | OUTPATIENT
Start: 2025-07-18 | End: 2025-10-16

## 2025-08-01 ENCOUNTER — OFFICE VISIT (OUTPATIENT)
Dept: PAIN MANAGEMENT | Age: 40
End: 2025-08-01
Payer: COMMERCIAL

## 2025-08-01 VITALS
DIASTOLIC BLOOD PRESSURE: 84 MMHG | HEART RATE: 71 BPM | RESPIRATION RATE: 18 BRPM | OXYGEN SATURATION: 98 % | SYSTOLIC BLOOD PRESSURE: 118 MMHG | BODY MASS INDEX: 37.49 KG/M2 | HEIGHT: 65 IN | WEIGHT: 225 LBS

## 2025-08-01 DIAGNOSIS — F31.9 BIPOLAR 1 DISORDER (HCC): ICD-10-CM

## 2025-08-01 DIAGNOSIS — F19.11 SUBSTANCE ABUSE IN REMISSION (HCC): ICD-10-CM

## 2025-08-01 DIAGNOSIS — G83.4 CAUDA EQUINA COMPRESSION (HCC): Primary | ICD-10-CM

## 2025-08-01 DIAGNOSIS — F32.A ANXIETY AND DEPRESSION: ICD-10-CM

## 2025-08-01 DIAGNOSIS — F41.9 ANXIETY AND DEPRESSION: ICD-10-CM

## 2025-08-01 DIAGNOSIS — F43.10 PTSD (POST-TRAUMATIC STRESS DISORDER): ICD-10-CM

## 2025-08-01 PROCEDURE — 99204 OFFICE O/P NEW MOD 45 MIN: CPT | Performed by: PHYSICAL MEDICINE & REHABILITATION

## 2025-08-01 RX ORDER — GABAPENTIN 800 MG/1
800 TABLET ORAL 3 TIMES DAILY
Qty: 90 TABLET | Refills: 0 | Status: SHIPPED | OUTPATIENT
Start: 2025-08-01 | End: 2025-08-31

## 2025-08-01 ASSESSMENT — ENCOUNTER SYMPTOMS: BACK PAIN: 1

## 2025-08-01 NOTE — PROGRESS NOTES
[facility]  [address] [place]. [address]    Patient Name: Ann-Marie Chavez  MRN: [id number]  Encounter Date: 8/1/2025     SUBJECTIVE:  Ann-Marie Chavez is a 39 y.o., female being seen today regarding   Chief Complaint   Patient presents with    New Patient    Back Pain     lumbar   .  History of Present Illness  The patient is referred by Dr. Morales to Dr. Vila, a neurospine surgeon, for evaluation of lower back pain and bladder incontinence.    She reports severe lower back pain that has been progressively worsening over the past few months. This pain is affecting her ability to perform her job, particularly tasks that involve standing and walking. She also experiences numbness in her anterior leg and hip, which occasionally extends down the back of her left leg. The pain is more pronounced on the right side than the left. She has not experienced any new injuries. She is considering the use of a cane for mobility support.     She is seeking relief from her symptoms as they are significantly impacting her work. She is apprehensive about injections due to a previous experience where she was unable to walk or get out of bed for 2 to 3 days following an injection. She has been taking Neurontin 400 mg three times a day, but has previously taken 800 mg three times a day without any adverse effects.     She underwent an L3-5 fusion surgery in 2022, which initially provided some relief. However, she has been experiencing these symptoms since the surgery. She reports occasional bladder incontinence.    PAST SURGICAL HISTORY:  L3-5 fusion surgery in 2022.       Functionality Assessment & Goals:  On a scale of 0 (Does not Interfere) to 10 (Completely Interferes)  Which number describes how during the past week pain has interfered with the following:   A.  General Activity: 5    B.  Mood:  8   C.  Walking Ability:  7   D.  Normal Work (Includes both work outside the home and housework):

## 2025-08-05 ENCOUNTER — HOSPITAL ENCOUNTER (OUTPATIENT)
Dept: LAB | Age: 40
Setting detail: SPECIMEN
End: 2025-08-05
Payer: COMMERCIAL

## 2025-08-05 ENCOUNTER — HOSPITAL ENCOUNTER (OUTPATIENT)
Dept: LAB | Age: 40
Discharge: HOME | End: 2025-08-05
Payer: COMMERCIAL

## 2025-08-05 ENCOUNTER — OFFICE VISIT (OUTPATIENT)
Dept: OBGYN | Age: 40
End: 2025-08-05
Payer: COMMERCIAL

## 2025-08-05 ENCOUNTER — HOSPITAL ENCOUNTER (OUTPATIENT)
Age: 40
Setting detail: SPECIMEN
Discharge: HOME | End: 2025-08-05
Payer: COMMERCIAL

## 2025-08-05 VITALS
HEART RATE: 74 BPM | DIASTOLIC BLOOD PRESSURE: 84 MMHG | WEIGHT: 221.5 LBS | BODY MASS INDEX: 36.86 KG/M2 | SYSTOLIC BLOOD PRESSURE: 124 MMHG | OXYGEN SATURATION: 98 %

## 2025-08-05 DIAGNOSIS — R10.2 PELVIC PAIN: ICD-10-CM

## 2025-08-05 DIAGNOSIS — N92.6 IRREGULAR MENSTRUAL CYCLE: ICD-10-CM

## 2025-08-05 DIAGNOSIS — Z11.3 ENCNTR SCREEN FOR INFECTIONS W SEXL MODE OF TRANSMISS: ICD-10-CM

## 2025-08-05 DIAGNOSIS — Z12.31 SCREENING MAMMOGRAM FOR BREAST CANCER: ICD-10-CM

## 2025-08-05 DIAGNOSIS — Z01.419 WOMEN'S ANNUAL ROUTINE GYNECOLOGICAL EXAMINATION: Primary | ICD-10-CM

## 2025-08-05 LAB
B-HCG SERPL EIA 3RD IS-ACNC: <0.2 MIU/ML
ESTRADIOL LEVEL: 149 PG/ML
FSH SERPL-ACNC: 7.3 MIU/ML
LH SERPL-ACNC: 10.3 MIU/ML (ref 1.7–8.6)
T4 FREE SERPL-MCNC: 1.2 NG/DL (ref 0.92–1.68)
TSH SERPL DL<=0.05 MIU/L-ACNC: 0.94 UIU/ML (ref 0.27–4.2)

## 2025-08-05 PROCEDURE — 87624 HPV HI-RISK TYP POOLED RSLT: CPT

## 2025-08-05 PROCEDURE — 87491 CHLMYD TRACH DNA AMP PROBE: CPT

## 2025-08-05 PROCEDURE — 82670 ASSAY OF TOTAL ESTRADIOL: CPT

## 2025-08-05 PROCEDURE — G8417 CALC BMI ABV UP PARAM F/U: HCPCS | Performed by: ADVANCED PRACTICE MIDWIFE

## 2025-08-05 PROCEDURE — 87591 N.GONORRHOEAE DNA AMP PROB: CPT

## 2025-08-05 PROCEDURE — 83002 ASSAY OF GONADOTROPIN (LH): CPT

## 2025-08-05 PROCEDURE — 36415 COLL VENOUS BLD VENIPUNCTURE: CPT

## 2025-08-05 PROCEDURE — 83001 ASSAY OF GONADOTROPIN (FSH): CPT

## 2025-08-05 PROCEDURE — 99396 PREV VISIT EST AGE 40-64: CPT | Performed by: ADVANCED PRACTICE MIDWIFE

## 2025-08-05 PROCEDURE — 88175 CYTOPATH C/V AUTO FLUID REDO: CPT

## 2025-08-05 PROCEDURE — 84439 ASSAY OF FREE THYROXINE: CPT

## 2025-08-05 PROCEDURE — 1036F TOBACCO NON-USER: CPT | Performed by: ADVANCED PRACTICE MIDWIFE

## 2025-08-05 PROCEDURE — 84443 ASSAY THYROID STIM HORMONE: CPT

## 2025-08-05 PROCEDURE — 99213 OFFICE O/P EST LOW 20 MIN: CPT | Performed by: ADVANCED PRACTICE MIDWIFE

## 2025-08-05 PROCEDURE — 84702 CHORIONIC GONADOTROPIN TEST: CPT

## 2025-08-05 PROCEDURE — G8427 DOCREV CUR MEDS BY ELIG CLIN: HCPCS | Performed by: ADVANCED PRACTICE MIDWIFE

## 2025-08-05 ASSESSMENT — PATIENT HEALTH QUESTIONNAIRE - PHQ9
SUM OF ALL RESPONSES TO PHQ QUESTIONS 1-9: 0
1. LITTLE INTEREST OR PLEASURE IN DOING THINGS: NOT AT ALL
2. FEELING DOWN, DEPRESSED OR HOPELESS: NOT AT ALL
SUM OF ALL RESPONSES TO PHQ QUESTIONS 1-9: 0

## 2025-08-05 ASSESSMENT — ENCOUNTER SYMPTOMS
GASTROINTESTINAL NEGATIVE: 1
RESPIRATORY NEGATIVE: 1
EYES NEGATIVE: 1

## 2025-08-06 ENCOUNTER — RESULTS FOLLOW-UP (OUTPATIENT)
Dept: OBGYN | Age: 40
End: 2025-08-06

## 2025-08-06 DIAGNOSIS — Z01.812 BLOOD TESTS PRIOR TO TREATMENT OR PROCEDURE: ICD-10-CM

## 2025-08-06 DIAGNOSIS — R10.2 PELVIC PAIN: Primary | ICD-10-CM

## 2025-08-06 LAB
C TRACH DNA SPEC QL PROBE+SIG AMP: NEGATIVE
N GONORRHOEA DNA SPEC QL PROBE+SIG AMP: NEGATIVE
SPECIMEN DESCRIPTION: NORMAL

## 2025-08-07 LAB
HPV I/H RISK 4 DNA CVX QL NAA+PROBE: NOT DETECTED
HPV SAMPLE: NORMAL
HPV, INTERPRETATION: NORMAL
HPV16 DNA CVX QL NAA+PROBE: NOT DETECTED
HPV18 DNA CVX QL NAA+PROBE: NOT DETECTED
SPECIMEN DESCRIPTION: NORMAL

## 2025-08-08 ENCOUNTER — OFFICE VISIT (OUTPATIENT)
Dept: FAMILY MEDICINE CLINIC | Age: 40
End: 2025-08-08
Payer: COMMERCIAL

## 2025-08-08 VITALS
HEART RATE: 77 BPM | SYSTOLIC BLOOD PRESSURE: 116 MMHG | DIASTOLIC BLOOD PRESSURE: 68 MMHG | HEIGHT: 65 IN | BODY MASS INDEX: 37.04 KG/M2 | WEIGHT: 222.3 LBS | OXYGEN SATURATION: 96 %

## 2025-08-08 DIAGNOSIS — M54.41 CHRONIC MIDLINE LOW BACK PAIN WITH BILATERAL SCIATICA: Primary | ICD-10-CM

## 2025-08-08 DIAGNOSIS — G89.29 CHRONIC MIDLINE LOW BACK PAIN WITH BILATERAL SCIATICA: Primary | ICD-10-CM

## 2025-08-08 DIAGNOSIS — M54.42 CHRONIC MIDLINE LOW BACK PAIN WITH BILATERAL SCIATICA: Primary | ICD-10-CM

## 2025-08-08 DIAGNOSIS — E66.01 CLASS 2 SEVERE OBESITY DUE TO EXCESS CALORIES WITH SERIOUS COMORBIDITY AND BODY MASS INDEX (BMI) OF 36.0 TO 36.9 IN ADULT (HCC): ICD-10-CM

## 2025-08-08 DIAGNOSIS — Z72.0 TOBACCO ABUSE: ICD-10-CM

## 2025-08-08 DIAGNOSIS — K04.7 DENTAL INFECTION: ICD-10-CM

## 2025-08-08 DIAGNOSIS — J41.8 MIXED SIMPLE AND MUCOPURULENT CHRONIC BRONCHITIS (HCC): ICD-10-CM

## 2025-08-08 DIAGNOSIS — E66.812 CLASS 2 SEVERE OBESITY DUE TO EXCESS CALORIES WITH SERIOUS COMORBIDITY AND BODY MASS INDEX (BMI) OF 36.0 TO 36.9 IN ADULT (HCC): ICD-10-CM

## 2025-08-08 PROCEDURE — G8427 DOCREV CUR MEDS BY ELIG CLIN: HCPCS | Performed by: FAMILY MEDICINE

## 2025-08-08 PROCEDURE — 3023F SPIROM DOC REV: CPT | Performed by: FAMILY MEDICINE

## 2025-08-08 PROCEDURE — 1036F TOBACCO NON-USER: CPT | Performed by: FAMILY MEDICINE

## 2025-08-08 PROCEDURE — G8417 CALC BMI ABV UP PARAM F/U: HCPCS | Performed by: FAMILY MEDICINE

## 2025-08-08 PROCEDURE — 99214 OFFICE O/P EST MOD 30 MIN: CPT | Performed by: FAMILY MEDICINE

## 2025-08-08 RX ORDER — ONDANSETRON 4 MG/1
4 TABLET, ORALLY DISINTEGRATING ORAL 3 TIMES DAILY PRN
Qty: 21 TABLET | Refills: 0 | Status: SHIPPED | OUTPATIENT
Start: 2025-08-08

## 2025-08-08 RX ORDER — CLINDAMYCIN HYDROCHLORIDE 300 MG/1
300 CAPSULE ORAL 3 TIMES DAILY
Qty: 30 CAPSULE | Refills: 0 | Status: SHIPPED | OUTPATIENT
Start: 2025-08-08 | End: 2025-08-18

## 2025-08-08 ASSESSMENT — ENCOUNTER SYMPTOMS
WHEEZING: 0
COUGH: 0
SHORTNESS OF BREATH: 0
CHEST TIGHTNESS: 0
BACK PAIN: 1

## 2025-08-21 ENCOUNTER — HOSPITAL ENCOUNTER (OUTPATIENT)
Dept: ULTRASOUND IMAGING | Age: 40
Discharge: HOME | End: 2025-08-23
Payer: COMMERCIAL

## 2025-08-21 DIAGNOSIS — R10.2 PELVIC PAIN: ICD-10-CM

## 2025-08-21 DIAGNOSIS — N92.6 IRREGULAR MENSTRUAL CYCLE: ICD-10-CM

## 2025-08-21 LAB — CYTOLOGY REPORT: NORMAL

## 2025-08-21 PROCEDURE — 76856 US EXAM PELVIC COMPLETE: CPT

## 2025-08-22 DIAGNOSIS — M79.671 RIGHT FOOT PAIN: Primary | ICD-10-CM

## 2025-08-25 ENCOUNTER — TELEPHONE (OUTPATIENT)
Dept: NEUROSURGERY | Age: 40
End: 2025-08-25

## 2025-08-25 ENCOUNTER — OFFICE VISIT (OUTPATIENT)
Dept: PAIN MANAGEMENT | Age: 40
End: 2025-08-25
Payer: COMMERCIAL

## 2025-08-25 VITALS
WEIGHT: 228.8 LBS | BODY MASS INDEX: 38.07 KG/M2 | HEART RATE: 68 BPM | SYSTOLIC BLOOD PRESSURE: 118 MMHG | DIASTOLIC BLOOD PRESSURE: 76 MMHG | OXYGEN SATURATION: 100 %

## 2025-08-25 DIAGNOSIS — F19.11 SUBSTANCE ABUSE IN REMISSION (HCC): ICD-10-CM

## 2025-08-25 DIAGNOSIS — F31.9 BIPOLAR 1 DISORDER (HCC): ICD-10-CM

## 2025-08-25 DIAGNOSIS — G83.4 CAUDA EQUINA COMPRESSION (HCC): Primary | ICD-10-CM

## 2025-08-25 DIAGNOSIS — M96.1 LUMBAR POST-LAMINECTOMY SYNDROME: ICD-10-CM

## 2025-08-25 DIAGNOSIS — F43.10 PTSD (POST-TRAUMATIC STRESS DISORDER): ICD-10-CM

## 2025-08-25 DIAGNOSIS — F41.9 ANXIETY AND DEPRESSION: ICD-10-CM

## 2025-08-25 DIAGNOSIS — M54.16 LUMBAR RADICULITIS: ICD-10-CM

## 2025-08-25 DIAGNOSIS — F32.A ANXIETY AND DEPRESSION: ICD-10-CM

## 2025-08-25 PROCEDURE — G8427 DOCREV CUR MEDS BY ELIG CLIN: HCPCS | Performed by: PHYSICAL MEDICINE & REHABILITATION

## 2025-08-25 PROCEDURE — 99213 OFFICE O/P EST LOW 20 MIN: CPT | Performed by: PHYSICAL MEDICINE & REHABILITATION

## 2025-08-25 PROCEDURE — 1036F TOBACCO NON-USER: CPT | Performed by: PHYSICAL MEDICINE & REHABILITATION

## 2025-08-25 PROCEDURE — 99214 OFFICE O/P EST MOD 30 MIN: CPT | Performed by: PHYSICAL MEDICINE & REHABILITATION

## 2025-08-25 PROCEDURE — G8417 CALC BMI ABV UP PARAM F/U: HCPCS | Performed by: PHYSICAL MEDICINE & REHABILITATION

## 2025-08-25 ASSESSMENT — ENCOUNTER SYMPTOMS: BACK PAIN: 1

## 2025-08-26 RX ORDER — GABAPENTIN 800 MG/1
800 TABLET ORAL 3 TIMES DAILY
Qty: 90 TABLET | Refills: 2 | Status: SHIPPED | OUTPATIENT
Start: 2025-08-26 | End: 2025-11-24

## 2025-09-04 ENCOUNTER — HOSPITAL ENCOUNTER (OUTPATIENT)
Dept: GENERAL RADIOLOGY | Age: 40
Discharge: HOME | End: 2025-09-06
Attending: PODIATRIST
Payer: COMMERCIAL

## 2025-09-04 DIAGNOSIS — M79.671 RIGHT FOOT PAIN: ICD-10-CM

## 2025-09-04 PROCEDURE — 73630 X-RAY EXAM OF FOOT: CPT

## (undated) DEVICE — GLOVE ORANGE PI 7   MSG9070

## (undated) DEVICE — SOLUTION IV IRRIG POUR BRL 0.9% SODIUM CHL 2F7124

## (undated) DEVICE — DEVICE CLOSURE SKIN SURG

## (undated) DEVICE — GLOVE ORANGE PI 7 1/2   MSG9075

## (undated) DEVICE — GAUZE,SPONGE,FLUFF,6"X6.75",STRL,5/TRAY: Brand: MEDLINE

## (undated) DEVICE — BANDAGE COMPR W4INXL12FT E DISP ESMARCH EVEN

## (undated) DEVICE — Device: Brand: PROSTEP™ TBC

## (undated) DEVICE — SUTURE FIBERWIRE 2-0 L18IN NONABSORBABLE BLU L17.9MM 3/8 AR7220

## (undated) DEVICE — SHELF DRILL: Brand: PROSTEP™ TBC

## (undated) DEVICE — BURR: Brand: MICA

## (undated) DEVICE — CHLORAPREP 26ML ORANGE

## (undated) DEVICE — K-WIRE BLUNT/TROCAR
Type: IMPLANTABLE DEVICE | Site: FOOT | Status: NON-FUNCTIONAL
Removed: 2020-06-23

## (undated) DEVICE — STEINMANN PIN: Brand: PROSTEP™ TBC

## (undated) DEVICE — YANKAUER,BULB TIP,W/O VENT,RIGID,STERILE: Brand: MEDLINE

## (undated) DEVICE — MIS STERILE INSTRUMENT PACK W/ BLADE: Brand: PROSTEP™

## (undated) DEVICE — PACK PROCEDURE SURG EXTREMITY MIN

## (undated) DEVICE — SUTURE MCRYL SZ 3-0 L27IN ABSRB UD L24MM PS-1 3/8 CIR PRIM Y936H

## (undated) DEVICE — GLOVE SURG SZ 65 THK91MIL LTX FREE SYN POLYISOPRENE

## (undated) DEVICE — GLOVE ORANGE PI 8   MSG9080

## (undated) DEVICE — SUTURE VCRL SZ 2-0 L27IN ABSRB UD L36MM CP-1 1/2 CIR REV J266H

## (undated) DEVICE — TUBING, SUCTION, 3/16" X 20', STRAIGHT: Brand: MEDLINE